# Patient Record
Sex: FEMALE | Race: BLACK OR AFRICAN AMERICAN | NOT HISPANIC OR LATINO | Employment: FULL TIME | URBAN - METROPOLITAN AREA
[De-identification: names, ages, dates, MRNs, and addresses within clinical notes are randomized per-mention and may not be internally consistent; named-entity substitution may affect disease eponyms.]

---

## 2022-09-01 ENCOUNTER — LAB VISIT (OUTPATIENT)
Dept: LAB | Facility: HOSPITAL | Age: 47
End: 2022-09-01
Attending: NURSE PRACTITIONER
Payer: MEDICAID

## 2022-09-01 DIAGNOSIS — N92.1 MENORRHAGIA WITH IRREGULAR CYCLE: ICD-10-CM

## 2022-09-01 DIAGNOSIS — N92.1 MENORRHAGIA WITH IRREGULAR CYCLE: Primary | ICD-10-CM

## 2022-09-01 LAB
BASOPHILS # BLD AUTO: 0.04 K/UL (ref 0–0.2)
BASOPHILS NFR BLD: 0.4 % (ref 0–1.9)
DIFFERENTIAL METHOD: ABNORMAL
EOSINOPHIL # BLD AUTO: 0.2 K/UL (ref 0–0.5)
EOSINOPHIL NFR BLD: 1.9 % (ref 0–8)
ERYTHROCYTE [DISTWIDTH] IN BLOOD BY AUTOMATED COUNT: 17.2 % (ref 11.5–14.5)
HCT VFR BLD AUTO: 27.2 % (ref 37–48.5)
HGB BLD-MCNC: 7.4 G/DL (ref 12–16)
IMM GRANULOCYTES # BLD AUTO: 0.02 K/UL (ref 0–0.04)
IMM GRANULOCYTES NFR BLD AUTO: 0.2 % (ref 0–0.5)
LYMPHOCYTES # BLD AUTO: 2 K/UL (ref 1–4.8)
LYMPHOCYTES NFR BLD: 22.1 % (ref 18–48)
MCH RBC QN AUTO: 20.9 PG (ref 27–31)
MCHC RBC AUTO-ENTMCNC: 27.2 G/DL (ref 32–36)
MCV RBC AUTO: 77 FL (ref 82–98)
MONOCYTES # BLD AUTO: 0.9 K/UL (ref 0.3–1)
MONOCYTES NFR BLD: 10.2 % (ref 4–15)
NEUTROPHILS # BLD AUTO: 5.9 K/UL (ref 1.8–7.7)
NEUTROPHILS NFR BLD: 65.2 % (ref 38–73)
NRBC BLD-RTO: 0 /100 WBC
PLATELET # BLD AUTO: 277 K/UL (ref 150–450)
PMV BLD AUTO: 12 FL (ref 9.2–12.9)
RBC # BLD AUTO: 3.54 M/UL (ref 4–5.4)
WBC # BLD AUTO: 9 K/UL (ref 3.9–12.7)

## 2022-09-01 PROCEDURE — 36415 COLL VENOUS BLD VENIPUNCTURE: CPT | Mod: PO | Performed by: NURSE PRACTITIONER

## 2022-09-01 PROCEDURE — 82728 ASSAY OF FERRITIN: CPT | Performed by: NURSE PRACTITIONER

## 2022-09-01 PROCEDURE — 85025 COMPLETE CBC W/AUTO DIFF WBC: CPT | Performed by: NURSE PRACTITIONER

## 2022-09-01 PROCEDURE — 84466 ASSAY OF TRANSFERRIN: CPT | Performed by: NURSE PRACTITIONER

## 2022-09-02 ENCOUNTER — PATIENT MESSAGE (OUTPATIENT)
Dept: HEMATOLOGY/ONCOLOGY | Facility: CLINIC | Age: 47
End: 2022-09-02
Payer: MEDICAID

## 2022-09-02 LAB
FERRITIN SERPL-MCNC: 5 NG/ML (ref 20–300)
IRON SERPL-MCNC: 17 UG/DL (ref 30–160)
SATURATED IRON: 3 % (ref 20–50)
TOTAL IRON BINDING CAPACITY: 601 UG/DL (ref 250–450)
TRANSFERRIN SERPL-MCNC: 406 MG/DL (ref 200–375)

## 2022-09-15 DIAGNOSIS — D50.0 IRON DEFICIENCY ANEMIA DUE TO CHRONIC BLOOD LOSS: ICD-10-CM

## 2022-09-15 RX ORDER — HEPARIN 100 UNIT/ML
500 SYRINGE INTRAVENOUS
Status: CANCELLED | OUTPATIENT
Start: 2022-09-15

## 2022-09-15 RX ORDER — HEPARIN 100 UNIT/ML
500 SYRINGE INTRAVENOUS
Status: CANCELLED | OUTPATIENT
Start: 2022-10-08

## 2022-09-15 RX ORDER — SODIUM CHLORIDE 0.9 % (FLUSH) 0.9 %
10 SYRINGE (ML) INJECTION
Status: CANCELLED | OUTPATIENT
Start: 2022-10-08

## 2022-09-15 RX ORDER — SODIUM CHLORIDE 0.9 % (FLUSH) 0.9 %
10 SYRINGE (ML) INJECTION
Status: CANCELLED | OUTPATIENT
Start: 2022-10-01

## 2022-09-15 RX ORDER — SODIUM CHLORIDE 0.9 % (FLUSH) 0.9 %
10 SYRINGE (ML) INJECTION
Status: CANCELLED | OUTPATIENT
Start: 2022-09-15

## 2022-09-15 RX ORDER — SODIUM CHLORIDE 0.9 % (FLUSH) 0.9 %
10 SYRINGE (ML) INJECTION
Status: CANCELLED | OUTPATIENT
Start: 2022-09-27

## 2022-09-15 RX ORDER — HEPARIN 100 UNIT/ML
500 SYRINGE INTRAVENOUS
Status: CANCELLED | OUTPATIENT
Start: 2022-10-01

## 2022-09-15 RX ORDER — HEPARIN 100 UNIT/ML
500 SYRINGE INTRAVENOUS
Status: CANCELLED | OUTPATIENT
Start: 2022-09-27

## 2022-09-15 RX ORDER — SODIUM CHLORIDE 0.9 % (FLUSH) 0.9 %
10 SYRINGE (ML) INJECTION
Status: CANCELLED | OUTPATIENT
Start: 2022-10-04

## 2022-09-15 RX ORDER — HEPARIN 100 UNIT/ML
500 SYRINGE INTRAVENOUS
Status: CANCELLED | OUTPATIENT
Start: 2022-10-04

## 2022-09-16 ENCOUNTER — PATIENT MESSAGE (OUTPATIENT)
Dept: HEMATOLOGY/ONCOLOGY | Facility: CLINIC | Age: 47
End: 2022-09-16
Payer: MEDICAID

## 2022-09-26 ENCOUNTER — INFUSION (OUTPATIENT)
Dept: INFUSION THERAPY | Facility: HOSPITAL | Age: 47
End: 2022-09-26
Attending: INTERNAL MEDICINE
Payer: MEDICAID

## 2022-09-26 VITALS
HEIGHT: 68 IN | DIASTOLIC BLOOD PRESSURE: 83 MMHG | OXYGEN SATURATION: 99 % | SYSTOLIC BLOOD PRESSURE: 136 MMHG | RESPIRATION RATE: 18 BRPM | BODY MASS INDEX: 32.74 KG/M2 | WEIGHT: 216.06 LBS | TEMPERATURE: 99 F | HEART RATE: 80 BPM

## 2022-09-26 DIAGNOSIS — D50.0 IRON DEFICIENCY ANEMIA DUE TO CHRONIC BLOOD LOSS: Primary | ICD-10-CM

## 2022-09-26 PROCEDURE — 63600175 PHARM REV CODE 636 W HCPCS: Performed by: NURSE PRACTITIONER

## 2022-09-26 PROCEDURE — 96374 THER/PROPH/DIAG INJ IV PUSH: CPT

## 2022-09-26 RX ORDER — SODIUM CHLORIDE 0.9 % (FLUSH) 0.9 %
10 SYRINGE (ML) INJECTION
Status: DISCONTINUED | OUTPATIENT
Start: 2022-09-26 | End: 2022-09-26 | Stop reason: HOSPADM

## 2022-09-26 RX ADMIN — IRON SUCROSE 200 MG: 20 INJECTION, SOLUTION INTRAVENOUS at 11:09

## 2022-09-26 NOTE — NURSING
Infusion# 1    Recent labs? Reviewed    Premeds? None    S/S of current or recent infections in the past 14 days? None/Denies    Recent Surgery/invasive procedures? None/Denies     Any recent vaccines ? None/Denies    Venofer 200 mg administered IV Pat a 8 minute rate per orders; see MAR and vitals for more  Details.

## 2022-09-29 ENCOUNTER — PATIENT MESSAGE (OUTPATIENT)
Dept: PEDIATRIC CARDIOLOGY | Facility: CLINIC | Age: 47
End: 2022-09-29
Payer: MEDICAID

## 2022-09-29 ENCOUNTER — INFUSION (OUTPATIENT)
Dept: INFUSION THERAPY | Facility: HOSPITAL | Age: 47
End: 2022-09-29
Attending: NURSE PRACTITIONER
Payer: COMMERCIAL

## 2022-09-29 VITALS
HEIGHT: 68 IN | RESPIRATION RATE: 18 BRPM | SYSTOLIC BLOOD PRESSURE: 140 MMHG | WEIGHT: 216.06 LBS | DIASTOLIC BLOOD PRESSURE: 80 MMHG | BODY MASS INDEX: 32.74 KG/M2 | TEMPERATURE: 98 F | HEART RATE: 80 BPM | OXYGEN SATURATION: 98 %

## 2022-09-29 DIAGNOSIS — D50.0 IRON DEFICIENCY ANEMIA DUE TO CHRONIC BLOOD LOSS: Primary | ICD-10-CM

## 2022-09-29 DIAGNOSIS — Q21.3 TOF (TETRALOGY OF FALLOT): Primary | ICD-10-CM

## 2022-09-29 PROCEDURE — 96374 THER/PROPH/DIAG INJ IV PUSH: CPT

## 2022-09-29 PROCEDURE — 63600175 PHARM REV CODE 636 W HCPCS: Performed by: NURSE PRACTITIONER

## 2022-09-29 RX ADMIN — IRON SUCROSE 200 MG: 20 INJECTION, SOLUTION INTRAVENOUS at 11:09

## 2022-09-29 NOTE — NURSING
Venofer  200 mg IVP over 5 minutes administered via left A/C PIV site.  Patient tolerated medication well without complication.   Discharged with appointments for future.

## 2022-10-03 ENCOUNTER — INFUSION (OUTPATIENT)
Dept: INFUSION THERAPY | Facility: HOSPITAL | Age: 47
End: 2022-10-03
Attending: NURSE PRACTITIONER
Payer: COMMERCIAL

## 2022-10-03 VITALS
HEART RATE: 80 BPM | DIASTOLIC BLOOD PRESSURE: 83 MMHG | BODY MASS INDEX: 33.69 KG/M2 | TEMPERATURE: 98 F | RESPIRATION RATE: 18 BRPM | WEIGHT: 221.56 LBS | SYSTOLIC BLOOD PRESSURE: 160 MMHG | OXYGEN SATURATION: 97 %

## 2022-10-03 DIAGNOSIS — D50.0 IRON DEFICIENCY ANEMIA DUE TO CHRONIC BLOOD LOSS: Primary | ICD-10-CM

## 2022-10-03 PROCEDURE — 25000003 PHARM REV CODE 250: Performed by: NURSE PRACTITIONER

## 2022-10-03 PROCEDURE — 63600175 PHARM REV CODE 636 W HCPCS: Performed by: NURSE PRACTITIONER

## 2022-10-03 PROCEDURE — 96374 THER/PROPH/DIAG INJ IV PUSH: CPT

## 2022-10-03 RX ORDER — SODIUM CHLORIDE 0.9 % (FLUSH) 0.9 %
10 SYRINGE (ML) INJECTION
Status: DISCONTINUED | OUTPATIENT
Start: 2022-10-03 | End: 2022-10-03 | Stop reason: HOSPADM

## 2022-10-03 RX ADMIN — IRON SUCROSE 200 MG: 20 INJECTION, SOLUTION INTRAVENOUS at 11:10

## 2022-10-03 RX ADMIN — SODIUM CHLORIDE: 0.9 INJECTION, SOLUTION INTRAVENOUS at 11:10

## 2022-10-03 NOTE — PLAN OF CARE
Problem: Adult Inpatient Plan of Care  Goal: Plan of Care Review  Outcome: Ongoing, Progressing  Flowsheets (Taken 10/3/2022 1129)  Plan of Care Reviewed With: patient  Goal: Patient-Specific Goal (Individualized)  Outcome: Ongoing, Progressing  Flowsheets (Taken 10/3/2022 1129)  Anxieties, Fears or Concerns: /o feeling bloated  Individualized Care Needs: war blanket and pillow proided  Patient-Specific Goals (Include Timeframe): tolerate enofer today  Goal: Absence of Hospital-Acquired Illness or Injury  Outcome: Ongoing, Progressing  Intervention: Identify and Manage Fall Risk  Flowsheets (Taken 10/3/2022 1129)  Safety Promotion/Fall Prevention:   assistive device/personal item within reach   in recliner, wheels locked   instructed to call staff for mobility  Goal: Optimal Comfort and Wellbeing  Outcome: Ongoing, Progressing     Problem: Anemia  Goal: Anemia Symptom Improvement  Outcome: Ongoing, Progressing

## 2022-10-06 ENCOUNTER — INFUSION (OUTPATIENT)
Dept: INFUSION THERAPY | Facility: HOSPITAL | Age: 47
End: 2022-10-06
Attending: NURSE PRACTITIONER
Payer: COMMERCIAL

## 2022-10-06 VITALS
TEMPERATURE: 98 F | BODY MASS INDEX: 32.81 KG/M2 | HEART RATE: 75 BPM | WEIGHT: 216.5 LBS | SYSTOLIC BLOOD PRESSURE: 157 MMHG | OXYGEN SATURATION: 97 % | HEIGHT: 68 IN | DIASTOLIC BLOOD PRESSURE: 80 MMHG | RESPIRATION RATE: 18 BRPM

## 2022-10-06 DIAGNOSIS — D50.0 IRON DEFICIENCY ANEMIA DUE TO CHRONIC BLOOD LOSS: Primary | ICD-10-CM

## 2022-10-06 PROCEDURE — 63600175 PHARM REV CODE 636 W HCPCS: Performed by: NURSE PRACTITIONER

## 2022-10-06 PROCEDURE — 96374 THER/PROPH/DIAG INJ IV PUSH: CPT

## 2022-10-06 RX ORDER — SODIUM CHLORIDE 0.9 % (FLUSH) 0.9 %
10 SYRINGE (ML) INJECTION
Status: DISCONTINUED | OUTPATIENT
Start: 2022-10-06 | End: 2022-10-06 | Stop reason: HOSPADM

## 2022-10-06 RX ADMIN — IRON SUCROSE 200 MG: 20 INJECTION, SOLUTION INTRAVENOUS at 11:10

## 2022-10-06 NOTE — NURSING
Venofer 200 mg IVP given over 8 minutes via left PIV.  Patient tolerated Venofer well.  Discharged patient after without complication.

## 2022-10-10 ENCOUNTER — INFUSION (OUTPATIENT)
Dept: INFUSION THERAPY | Facility: HOSPITAL | Age: 47
End: 2022-10-10
Attending: NURSE PRACTITIONER
Payer: COMMERCIAL

## 2022-10-10 VITALS
OXYGEN SATURATION: 97 % | SYSTOLIC BLOOD PRESSURE: 141 MMHG | TEMPERATURE: 97 F | RESPIRATION RATE: 18 BRPM | HEART RATE: 72 BPM | DIASTOLIC BLOOD PRESSURE: 69 MMHG

## 2022-10-10 DIAGNOSIS — D50.0 IRON DEFICIENCY ANEMIA DUE TO CHRONIC BLOOD LOSS: Primary | ICD-10-CM

## 2022-10-10 PROCEDURE — 96374 THER/PROPH/DIAG INJ IV PUSH: CPT

## 2022-10-10 PROCEDURE — 63600175 PHARM REV CODE 636 W HCPCS: Performed by: NURSE PRACTITIONER

## 2022-10-10 RX ADMIN — IRON SUCROSE 200 MG: 20 INJECTION, SOLUTION INTRAVENOUS at 11:10

## 2022-10-10 NOTE — PLAN OF CARE
Discussed plan of care with pt. Addressed any and ongoing concerns. Pt denies    Problem: Adult Inpatient Plan of Care  Goal: Plan of Care Review  Outcome: Ongoing, Progressing  Flowsheets (Taken 10/10/2022 1451)  Plan of Care Reviewed With: patient  Goal: Patient-Specific Goal (Individualized)  Outcome: Ongoing, Progressing  Goal: Absence of Hospital-Acquired Illness or Injury  Outcome: Ongoing, Progressing  Intervention: Identify and Manage Fall Risk  Flowsheets (Taken 10/10/2022 1451)  Safety Promotion/Fall Prevention: in recliner, wheels locked  Intervention: Prevent Infection  Flowsheets (Taken 10/10/2022 1451)  Infection Prevention:   hand hygiene promoted   equipment surfaces disinfected  Goal: Optimal Comfort and Wellbeing  Outcome: Ongoing, Progressing  Intervention: Provide Person-Centered Care  Flowsheets (Taken 10/10/2022 1451)  Trust Relationship/Rapport:   questions encouraged   questions answered   empathic listening provided   emotional support provided   choices provided   care explained   reassurance provided   thoughts/feelings acknowledged     Problem: Anemia  Goal: Anemia Symptom Improvement  Outcome: Ongoing, Progressing  Intervention: Monitor and Manage Anemia  Flowsheets (Taken 10/10/2022 1451)  Safety Promotion/Fall Prevention: in recliner, wheels locked

## 2022-10-17 ENCOUNTER — TELEPHONE (OUTPATIENT)
Dept: HEMATOLOGY/ONCOLOGY | Facility: CLINIC | Age: 47
End: 2022-10-17
Payer: MEDICAID

## 2022-10-17 NOTE — TELEPHONE ENCOUNTER
Spoke to pt in reference to Hematology appt scheduled via [facility].  Appt r/s per provider's orders.  Pt verbalized understanding.

## 2022-12-01 ENCOUNTER — LAB VISIT (OUTPATIENT)
Dept: LAB | Facility: HOSPITAL | Age: 47
End: 2022-12-01
Attending: NURSE PRACTITIONER
Payer: COMMERCIAL

## 2022-12-01 DIAGNOSIS — D50.0 IRON DEFICIENCY ANEMIA DUE TO CHRONIC BLOOD LOSS: ICD-10-CM

## 2022-12-01 LAB
ANION GAP SERPL CALC-SCNC: 13 MMOL/L (ref 8–16)
BASOPHILS # BLD AUTO: 0.06 K/UL (ref 0–0.2)
BASOPHILS NFR BLD: 0.7 % (ref 0–1.9)
BUN SERPL-MCNC: 14 MG/DL (ref 6–20)
CALCIUM SERPL-MCNC: 9.3 MG/DL (ref 8.7–10.5)
CHLORIDE SERPL-SCNC: 105 MMOL/L (ref 95–110)
CO2 SERPL-SCNC: 23 MMOL/L (ref 23–29)
CREAT SERPL-MCNC: 0.9 MG/DL (ref 0.5–1.4)
DIFFERENTIAL METHOD: ABNORMAL
EOSINOPHIL # BLD AUTO: 0.5 K/UL (ref 0–0.5)
EOSINOPHIL NFR BLD: 5.5 % (ref 0–8)
ERYTHROCYTE [DISTWIDTH] IN BLOOD BY AUTOMATED COUNT: 22 % (ref 11.5–14.5)
EST. GFR  (NO RACE VARIABLE): >60 ML/MIN/1.73 M^2
FERRITIN SERPL-MCNC: 14 NG/ML (ref 20–300)
GLUCOSE SERPL-MCNC: 90 MG/DL (ref 70–110)
HCT VFR BLD AUTO: 35.5 % (ref 37–48.5)
HGB BLD-MCNC: 10.8 G/DL (ref 12–16)
IMM GRANULOCYTES # BLD AUTO: 0.02 K/UL (ref 0–0.04)
IMM GRANULOCYTES NFR BLD AUTO: 0.2 % (ref 0–0.5)
LYMPHOCYTES # BLD AUTO: 1.9 K/UL (ref 1–4.8)
LYMPHOCYTES NFR BLD: 21.4 % (ref 18–48)
MCH RBC QN AUTO: 25.2 PG (ref 27–31)
MCHC RBC AUTO-ENTMCNC: 30.4 G/DL (ref 32–36)
MCV RBC AUTO: 83 FL (ref 82–98)
MONOCYTES # BLD AUTO: 0.9 K/UL (ref 0.3–1)
MONOCYTES NFR BLD: 10 % (ref 4–15)
NEUTROPHILS # BLD AUTO: 5.5 K/UL (ref 1.8–7.7)
NEUTROPHILS NFR BLD: 62.2 % (ref 38–73)
NRBC BLD-RTO: 0 /100 WBC
PLATELET # BLD AUTO: 209 K/UL (ref 150–450)
PMV BLD AUTO: 11.5 FL (ref 9.2–12.9)
POTASSIUM SERPL-SCNC: 3.8 MMOL/L (ref 3.5–5.1)
RBC # BLD AUTO: 4.28 M/UL (ref 4–5.4)
SODIUM SERPL-SCNC: 141 MMOL/L (ref 136–145)
WBC # BLD AUTO: 8.84 K/UL (ref 3.9–12.7)

## 2022-12-01 PROCEDURE — 80048 BASIC METABOLIC PNL TOTAL CA: CPT | Performed by: NURSE PRACTITIONER

## 2022-12-01 PROCEDURE — 82728 ASSAY OF FERRITIN: CPT | Performed by: NURSE PRACTITIONER

## 2022-12-01 PROCEDURE — 85025 COMPLETE CBC W/AUTO DIFF WBC: CPT | Performed by: NURSE PRACTITIONER

## 2022-12-01 PROCEDURE — 84466 ASSAY OF TRANSFERRIN: CPT | Performed by: NURSE PRACTITIONER

## 2022-12-01 PROCEDURE — 36415 COLL VENOUS BLD VENIPUNCTURE: CPT | Mod: PO | Performed by: NURSE PRACTITIONER

## 2022-12-02 LAB
IRON SERPL-MCNC: 70 UG/DL (ref 30–160)
SATURATED IRON: 13 % (ref 20–50)
TOTAL IRON BINDING CAPACITY: 530 UG/DL (ref 250–450)
TRANSFERRIN SERPL-MCNC: 358 MG/DL (ref 200–375)

## 2022-12-15 ENCOUNTER — PATIENT MESSAGE (OUTPATIENT)
Dept: HEMATOLOGY/ONCOLOGY | Facility: CLINIC | Age: 47
End: 2022-12-15
Payer: COMMERCIAL

## 2022-12-15 ENCOUNTER — TELEPHONE (OUTPATIENT)
Dept: HEMATOLOGY/ONCOLOGY | Facility: CLINIC | Age: 47
End: 2022-12-15
Payer: COMMERCIAL

## 2022-12-15 NOTE — TELEPHONE ENCOUNTER
----- Message from Brianna Jones NP sent at 12/15/2022  1:01 PM CST -----  Did labs for patient but has no f/u to discuss results.  Brianna Gonzales'

## 2022-12-16 DIAGNOSIS — D50.0 IRON DEFICIENCY ANEMIA DUE TO CHRONIC BLOOD LOSS: Primary | ICD-10-CM

## 2023-01-05 ENCOUNTER — OFFICE VISIT (OUTPATIENT)
Dept: HEMATOLOGY/ONCOLOGY | Facility: CLINIC | Age: 48
End: 2023-01-05
Payer: COMMERCIAL

## 2023-01-05 VITALS
HEIGHT: 67 IN | OXYGEN SATURATION: 98 % | WEIGHT: 219.38 LBS | HEART RATE: 86 BPM | BODY MASS INDEX: 34.43 KG/M2 | DIASTOLIC BLOOD PRESSURE: 75 MMHG | SYSTOLIC BLOOD PRESSURE: 121 MMHG

## 2023-01-05 DIAGNOSIS — R14.0 ABDOMINAL DISTENTION: ICD-10-CM

## 2023-01-05 DIAGNOSIS — R68.81 EARLY SATIETY: ICD-10-CM

## 2023-01-05 DIAGNOSIS — N92.1 MENORRHAGIA WITH IRREGULAR CYCLE: ICD-10-CM

## 2023-01-05 DIAGNOSIS — Q21.3 TETRALOGY OF FALLOT: ICD-10-CM

## 2023-01-05 DIAGNOSIS — D50.0 IRON DEFICIENCY ANEMIA DUE TO CHRONIC BLOOD LOSS: Primary | ICD-10-CM

## 2023-01-05 PROCEDURE — 99999 PR PBB SHADOW E&M-EST. PATIENT-LVL III: ICD-10-PCS | Mod: PBBFAC,,, | Performed by: NURSE PRACTITIONER

## 2023-01-05 PROCEDURE — 3074F PR MOST RECENT SYSTOLIC BLOOD PRESSURE < 130 MM HG: ICD-10-PCS | Mod: CPTII,S$GLB,, | Performed by: NURSE PRACTITIONER

## 2023-01-05 PROCEDURE — 3078F PR MOST RECENT DIASTOLIC BLOOD PRESSURE < 80 MM HG: ICD-10-PCS | Mod: CPTII,S$GLB,, | Performed by: NURSE PRACTITIONER

## 2023-01-05 PROCEDURE — 3074F SYST BP LT 130 MM HG: CPT | Mod: CPTII,S$GLB,, | Performed by: NURSE PRACTITIONER

## 2023-01-05 PROCEDURE — 1159F PR MEDICATION LIST DOCUMENTED IN MEDICAL RECORD: ICD-10-PCS | Mod: CPTII,S$GLB,, | Performed by: NURSE PRACTITIONER

## 2023-01-05 PROCEDURE — 99204 OFFICE O/P NEW MOD 45 MIN: CPT | Mod: S$GLB,,, | Performed by: NURSE PRACTITIONER

## 2023-01-05 PROCEDURE — 3008F PR BODY MASS INDEX (BMI) DOCUMENTED: ICD-10-PCS | Mod: CPTII,S$GLB,, | Performed by: NURSE PRACTITIONER

## 2023-01-05 PROCEDURE — 3008F BODY MASS INDEX DOCD: CPT | Mod: CPTII,S$GLB,, | Performed by: NURSE PRACTITIONER

## 2023-01-05 PROCEDURE — 99204 PR OFFICE/OUTPT VISIT, NEW, LEVL IV, 45-59 MIN: ICD-10-PCS | Mod: S$GLB,,, | Performed by: NURSE PRACTITIONER

## 2023-01-05 PROCEDURE — 3078F DIAST BP <80 MM HG: CPT | Mod: CPTII,S$GLB,, | Performed by: NURSE PRACTITIONER

## 2023-01-05 PROCEDURE — 1159F MED LIST DOCD IN RCRD: CPT | Mod: CPTII,S$GLB,, | Performed by: NURSE PRACTITIONER

## 2023-01-05 PROCEDURE — 99999 PR PBB SHADOW E&M-EST. PATIENT-LVL III: CPT | Mod: PBBFAC,,, | Performed by: NURSE PRACTITIONER

## 2023-01-05 PROCEDURE — 1160F PR REVIEW ALL MEDS BY PRESCRIBER/CLIN PHARMACIST DOCUMENTED: ICD-10-PCS | Mod: CPTII,S$GLB,, | Performed by: NURSE PRACTITIONER

## 2023-01-05 PROCEDURE — 1160F RVW MEDS BY RX/DR IN RCRD: CPT | Mod: CPTII,S$GLB,, | Performed by: NURSE PRACTITIONER

## 2023-01-05 RX ORDER — HEPARIN 100 UNIT/ML
500 SYRINGE INTRAVENOUS
Status: CANCELLED | OUTPATIENT
Start: 2023-01-18

## 2023-01-05 RX ORDER — SODIUM CHLORIDE 0.9 % (FLUSH) 0.9 %
10 SYRINGE (ML) INJECTION
Status: CANCELLED | OUTPATIENT
Start: 2023-01-22

## 2023-01-05 RX ORDER — DEXBROMPHENIRAMINE MALEATE, DEXTROMETHORPHAN HYDROBROMIDE AND PHENYLEPHRINE HYDROCHLORIDE 2; 15; 7.5 MG/5ML; MG/5ML; MG/5ML
5 LIQUID ORAL DAILY PRN
COMMUNITY
Start: 2022-11-29 | End: 2023-02-03

## 2023-01-05 RX ORDER — HEPARIN 100 UNIT/ML
500 SYRINGE INTRAVENOUS
Status: CANCELLED | OUTPATIENT
Start: 2023-01-25

## 2023-01-05 RX ORDER — SODIUM CHLORIDE 0.9 % (FLUSH) 0.9 %
10 SYRINGE (ML) INJECTION
Status: CANCELLED | OUTPATIENT
Start: 2023-01-18

## 2023-01-05 RX ORDER — HEPARIN 100 UNIT/ML
500 SYRINGE INTRAVENOUS
Status: CANCELLED | OUTPATIENT
Start: 2023-01-29

## 2023-01-05 RX ORDER — SODIUM CHLORIDE 0.9 % (FLUSH) 0.9 %
10 SYRINGE (ML) INJECTION
Status: CANCELLED | OUTPATIENT
Start: 2023-01-05

## 2023-01-05 RX ORDER — ASPIRIN 81 MG/1
1 TABLET ORAL DAILY
COMMUNITY
Start: 2022-08-15 | End: 2023-12-14

## 2023-01-05 RX ORDER — SODIUM CHLORIDE 0.9 % (FLUSH) 0.9 %
10 SYRINGE (ML) INJECTION
Status: CANCELLED | OUTPATIENT
Start: 2023-01-25

## 2023-01-05 RX ORDER — HEPARIN 100 UNIT/ML
500 SYRINGE INTRAVENOUS
Status: CANCELLED | OUTPATIENT
Start: 2023-01-22

## 2023-01-05 RX ORDER — SODIUM CHLORIDE 0.9 % (FLUSH) 0.9 %
10 SYRINGE (ML) INJECTION
Status: CANCELLED | OUTPATIENT
Start: 2023-01-29

## 2023-01-05 RX ORDER — HEPARIN 100 UNIT/ML
500 SYRINGE INTRAVENOUS
Status: CANCELLED | OUTPATIENT
Start: 2023-01-05

## 2023-01-05 NOTE — PROGRESS NOTES
Subjective:      Patient ID: Mer Priest is a 47 y.o. female.    Chief Complaint: fatigue    HPI:  Patient is a 47 year old female who presents today as a new patient for further evaluation and recommendations of RADHA due to heavy menstrual cycles.  Labs and IV venofer 200 mg IV push injections x 5 done with last dose on 10/10/2022.  She presents today to evaluate response.  Continues to be anemic; however improved with low ferritin however improved 14 (5).    Cardiac MRI Repaired tetralogy of Fallot. Enlarged with dilated right ventricle and right ventricular outflow tract. Mildly reduced right and left left ventricular function.  There is no mass or delayed enhancement of the left ventricular myocardium. Small diverticulum of the anterior septum. There is late gadolinium enhancement of the right ventricular outflow tract presumably related to patch repair.  No pericardial effusion.     Severely dilated right ventricular outflow tract. Normal pulmonary artery without branch stenosis. All pulmonary veins drain normally into the left atrium. 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 Normal head and neck vessels.      2021 Mammogram no evidence of malignancy          PAP - HPV not detected.      2021 Endometrial biopsy - chronic non specific endometritis    Uterus is 2x's the size it should be and has fibroids.  Needs to have surgery for congenital malformation of heart prior to having hysterectomy.      Social History     Socioeconomic History    Marital status: Single   Tobacco Use    Smoking status: Former     Packs/day: 0.50     Types: Cigarettes     Quit date: 2022     Years since quittin.6    Smokeless tobacco: Never       Family History   Problem Relation Age of Onset    Prostate cancer Father     Ovarian cancer Cousin        Past Surgical History:   Procedure Laterality Date    BILATERAL TUBAL LIGATION      TETRALOGY OF FALLOT REPAIR         Past Medical History:   Diagnosis  Date    Heart disease     Hypertension     Syphilis 11/2020       Review of Systems   Constitutional:  Positive for appetite change (early satiety), fatigue (starting to feel more fatigued) and unexpected weight change (weight gain).   Eyes: Negative.    Respiratory:  Positive for shortness of breath (with exertion - improved).    Cardiovascular:  Positive for palpitations (sometimes - at night).   Gastrointestinal:  Positive for abdominal distention and constipation (sometimes). Negative for anal bleeding and blood in stool.   Endocrine: Positive for heat intolerance. Negative for cold intolerance.   Genitourinary:  Positive for menstrual problem (LMP very heavy about 2-3 weeks.).   Musculoskeletal: Negative.    Skin: Negative.    Allergic/Immunologic: Negative.    Neurological:  Positive for light-headedness.   Psychiatric/Behavioral:  Positive for sleep disturbance. The patient is nervous/anxious.         Medication List with Changes/Refills   Current Medications    ALAHIST DM 2-7.5-15 MG/5 ML LIQD    Take 5 mLs by mouth daily as needed.    AMLODIPINE (NORVASC) 5 MG TABLET    Take 1 tablet by mouth every day    ASPIRIN (ECOTRIN) 81 MG EC TABLET    Take 1 tablet by mouth once daily.    GABAPENTIN (NEURONTIN) 300 MG CAPSULE    Take 1 capsule by mouth 3 (three) times daily.    HYDROCHLOROTHIAZIDE (MICROZIDE) 12.5 MG CAPSULE    Take 1 capsule (12.5 mg total) by mouth once daily.    METOPROLOL SUCCINATE (TOPROL-XL) 50 MG 24 HR TABLET    Take 1 tablet by mouth every day   Discontinued Medications    AMLODIPINE (NORVASC) 10 MG TABLET    Take 1 tablet by mouth every day    HYDROCHLOROTHIAZIDE (MICROZIDE) 12.5 MG CAPSULE    Take 12.5 mg by mouth once daily.        Objective:     Vitals:    01/05/23 1309   BP: 121/75   Pulse: 86       Physical Exam  Vitals reviewed.   Constitutional:       Appearance: Normal appearance.   HENT:      Head: Normocephalic and atraumatic.      Right Ear: External ear normal.      Left Ear:  External ear normal.   Cardiovascular:      Rate and Rhythm: Normal rate and regular rhythm.      Heart sounds: S1 normal and S2 normal. Murmur heard.   Pulmonary:      Effort: Pulmonary effort is normal.      Breath sounds: Normal breath sounds. No rales.   Abdominal:      General: There is no distension.   Musculoskeletal:         General: Normal range of motion.      Cervical back: Normal range of motion.   Skin:     General: Skin is warm and dry.   Neurological:      General: No focal deficit present.      Mental Status: She is alert and oriented to person, place, and time.   Psychiatric:         Attention and Perception: Attention and perception normal.         Mood and Affect: Mood and affect normal.         Speech: Speech normal.         Behavior: Behavior normal. Behavior is cooperative.         Thought Content: Thought content normal.         Cognition and Memory: Cognition and memory normal.         Judgment: Judgment normal.       Assessment:     Problem List Items Addressed This Visit          Oncology    Iron deficiency anemia due to chronic blood loss - Primary     Other Visit Diagnoses       Menorrhagia with irregular cycle        Tetralogy of Fallot        Relevant Orders    Echo    EKG 12-lead    Ambulatory referral/consult to Cardiothoracic Surgery    Early satiety        Relevant Orders    US Abdomen Complete    Abdominal distention        Relevant Orders    US Abdomen Complete            Lab Results   Component Value Date    WBC 8.84 12/01/2022    RBC 4.28 12/01/2022    HGB 10.8 (L) 12/01/2022    HCT 35.5 (L) 12/01/2022    MCV 83 12/01/2022    MCH 25.2 (L) 12/01/2022    MCHC 30.4 (L) 12/01/2022    RDW 22.0 (H) 12/01/2022     12/01/2022    MPV 11.5 12/01/2022    GRAN 5.5 12/01/2022    GRAN 62.2 12/01/2022    LYMPH 1.9 12/01/2022    LYMPH 21.4 12/01/2022    MONO 0.9 12/01/2022    MONO 10.0 12/01/2022    EOS 0.5 12/01/2022    BASO 0.06 12/01/2022    EOSINOPHIL 5.5 12/01/2022    BASOPHIL 0.7  12/01/2022      Lab Results   Component Value Date     12/01/2022    K 3.8 12/01/2022     12/01/2022    CO2 23 12/01/2022    BUN 14 12/01/2022    CREATININE 0.9 12/01/2022    CALCIUM 9.3 12/01/2022    ANIONGAP 13 12/01/2022     No results found for: ALT, AST, GGT, ALKPHOS, BILITOT    Lab Results   Component Value Date    IRON 70 12/01/2022    TRANSFERRIN 358 12/01/2022    TIBC 530 (H) 12/01/2022    FESATURATED 13 (L) 12/01/2022      Lab Results   Component Value Date    FERRITIN 14 (L) 12/01/2022         Plan:   Iron deficiency anemia due to chronic blood loss    Menorrhagia with irregular cycle    Tetralogy of Fallot  -     Echo; Future  -     EKG 12-lead; Future  -     Ambulatory referral/consult to Cardiothoracic Surgery; Future; Expected date: 01/12/2023    Early satiety  -     US Abdomen Complete; Future; Expected date: 01/05/2023    Abdominal distention  -     US Abdomen Complete; Future; Expected date: 01/05/2023    Other orders  -     iron sucrose injection 200 mg  -     sodium chloride 0.9% 250 mL flush bag  -     sodium chloride 0.9% flush 10 mL  -     heparin, porcine (PF) 100 unit/mL injection flush 500 Units  -     alteplase injection 2 mg  -     iron sucrose injection 200 mg  -     sodium chloride 0.9% 250 mL flush bag  -     sodium chloride 0.9% flush 10 mL  -     heparin, porcine (PF) 100 unit/mL injection flush 500 Units  -     alteplase injection 2 mg  -     iron sucrose injection 200 mg  -     sodium chloride 0.9% 250 mL flush bag  -     sodium chloride 0.9% flush 10 mL  -     heparin, porcine (PF) 100 unit/mL injection flush 500 Units  -     alteplase injection 2 mg  -     iron sucrose injection 200 mg  -     sodium chloride 0.9% 250 mL flush bag  -     sodium chloride 0.9% flush 10 mL  -     heparin, porcine (PF) 100 unit/mL injection flush 500 Units  -     alteplase injection 2 mg  -     iron sucrose injection 200 mg  -     sodium chloride 0.9% 250 mL flush bag  -     sodium  chloride 0.9% flush 10 mL  -     heparin, porcine (PF) 100 unit/mL injection flush 500 Units  -     alteplase injection 2 mg      Med Onc Chart Routing      Follow up with physician    Follow up with [facility] . F/u 6 weeks after last iron treatment with labs prior cbc, bmp, iron studies at [place]   Infusion scheduling note schedule for venofer 200 mg IV push D1 and D4 x 5 doses at [facility]   Injection scheduling note n/a   Labs   Lab interval:  See above   Imaging   Abdominal US complete to assess abdominal distention/early satiety; schedule for ECHO and 12 lead EKG prior to cardiothoracic surgeon at [facility]- preferably Dr. Alva   Pharmacy appointment No pharmacy appointment needed      Other referrals Additional referrals needed            Collaborating Provider:  Dr. Martin Bucriaga    Thank You,  Earl Jones, FNP-C  Hematology Oncology

## 2023-01-10 ENCOUNTER — HOSPITAL ENCOUNTER (OUTPATIENT)
Dept: RADIOLOGY | Facility: HOSPITAL | Age: 48
Discharge: HOME | End: 2023-01-10
Attending: NURSE PRACTITIONER
Payer: COMMERCIAL

## 2023-01-10 DIAGNOSIS — R14.0 ABDOMINAL DISTENTION: ICD-10-CM

## 2023-01-10 DIAGNOSIS — R68.81 EARLY SATIETY: ICD-10-CM

## 2023-01-10 PROCEDURE — 76700 US ABDOMEN COMPLETE: ICD-10-PCS | Mod: 26,,, | Performed by: RADIOLOGY

## 2023-01-10 PROCEDURE — 76700 US EXAM ABDOM COMPLETE: CPT | Mod: TC

## 2023-01-10 PROCEDURE — 76700 US EXAM ABDOM COMPLETE: CPT | Mod: 26,,, | Performed by: RADIOLOGY

## 2023-01-12 ENCOUNTER — PATIENT MESSAGE (OUTPATIENT)
Dept: HEMATOLOGY/ONCOLOGY | Facility: CLINIC | Age: 48
End: 2023-01-12
Payer: COMMERCIAL

## 2023-01-12 DIAGNOSIS — D50.0 IRON DEFICIENCY ANEMIA DUE TO CHRONIC BLOOD LOSS: Primary | ICD-10-CM

## 2023-01-13 ENCOUNTER — HOSPITAL ENCOUNTER (OUTPATIENT)
Dept: CARDIOLOGY | Facility: HOSPITAL | Age: 48
Discharge: HOME | End: 2023-01-13
Payer: COMMERCIAL

## 2023-01-13 DIAGNOSIS — D50.0 IRON DEFICIENCY ANEMIA DUE TO CHRONIC BLOOD LOSS: ICD-10-CM

## 2023-01-13 PROCEDURE — 93010 ELECTROCARDIOGRAM REPORT: CPT | Mod: ,,, | Performed by: INTERNAL MEDICINE

## 2023-01-13 PROCEDURE — 93010 EKG 12-LEAD: ICD-10-PCS | Mod: ,,, | Performed by: INTERNAL MEDICINE

## 2023-01-13 PROCEDURE — 93005 ELECTROCARDIOGRAM TRACING: CPT

## 2023-01-17 ENCOUNTER — INFUSION (OUTPATIENT)
Dept: INFUSION THERAPY | Facility: HOSPITAL | Age: 48
End: 2023-01-17
Attending: NURSE PRACTITIONER
Payer: COMMERCIAL

## 2023-01-17 VITALS
HEART RATE: 78 BPM | RESPIRATION RATE: 20 BRPM | OXYGEN SATURATION: 98 % | DIASTOLIC BLOOD PRESSURE: 82 MMHG | SYSTOLIC BLOOD PRESSURE: 146 MMHG | TEMPERATURE: 98 F

## 2023-01-17 DIAGNOSIS — D50.0 IRON DEFICIENCY ANEMIA DUE TO CHRONIC BLOOD LOSS: Primary | ICD-10-CM

## 2023-01-17 PROCEDURE — 25000003 PHARM REV CODE 250: Performed by: NURSE PRACTITIONER

## 2023-01-17 PROCEDURE — 96374 THER/PROPH/DIAG INJ IV PUSH: CPT

## 2023-01-17 PROCEDURE — 63600175 PHARM REV CODE 636 W HCPCS: Performed by: NURSE PRACTITIONER

## 2023-01-17 RX ORDER — SODIUM CHLORIDE 0.9 % (FLUSH) 0.9 %
10 SYRINGE (ML) INJECTION
Status: DISCONTINUED | OUTPATIENT
Start: 2023-01-17 | End: 2023-01-17 | Stop reason: HOSPADM

## 2023-01-17 RX ADMIN — SODIUM CHLORIDE: 9 INJECTION, SOLUTION INTRAVENOUS at 03:01

## 2023-01-17 RX ADMIN — IRON SUCROSE 200 MG: 20 INJECTION, SOLUTION INTRAVENOUS at 03:01

## 2023-01-17 NOTE — PLAN OF CARE
Problem: Adult Inpatient Plan of Care  Goal: Plan of Care Review  Outcome: Ongoing, Progressing  Flowsheets (Taken 1/17/2023 1603)  Plan of Care Reviewed With: patient  Goal: Patient-Specific Goal (Individualized)  Outcome: Ongoing, Progressing  Flowsheets (Taken 1/17/2023 1603)  Anxieties, Fears or Concerns: denies  Individualized Care Needs: feet elevated  Goal: Optimal Comfort and Wellbeing  Outcome: Ongoing, Progressing  Intervention: Provide Person-Centered Care  Flowsheets (Taken 1/17/2023 1603)  Trust Relationship/Rapport:   care explained   questions encouraged   choices provided   reassurance provided   emotional support provided   thoughts/feelings acknowledged   empathic listening provided   questions answered     Problem: Anemia  Goal: Anemia Symptom Improvement  Outcome: Ongoing, Progressing  Intervention: Monitor and Manage Anemia  Flowsheets (Taken 1/17/2023 1603)  Safety Promotion/Fall Prevention:   assistive device/personal item within reach   Fall Risk reviewed with patient/family   in recliner, wheels locked   instructed to call staff for mobility  Fatigue Management: frequent rest breaks encouraged

## 2023-01-20 ENCOUNTER — INFUSION (OUTPATIENT)
Dept: INFUSION THERAPY | Facility: HOSPITAL | Age: 48
End: 2023-01-20
Attending: NURSE PRACTITIONER
Payer: COMMERCIAL

## 2023-01-20 VITALS
SYSTOLIC BLOOD PRESSURE: 136 MMHG | OXYGEN SATURATION: 98 % | DIASTOLIC BLOOD PRESSURE: 66 MMHG | RESPIRATION RATE: 16 BRPM | HEART RATE: 70 BPM | TEMPERATURE: 98 F

## 2023-01-20 DIAGNOSIS — D50.0 IRON DEFICIENCY ANEMIA DUE TO CHRONIC BLOOD LOSS: Primary | ICD-10-CM

## 2023-01-20 PROCEDURE — 96374 THER/PROPH/DIAG INJ IV PUSH: CPT

## 2023-01-20 PROCEDURE — 63600175 PHARM REV CODE 636 W HCPCS: Performed by: NURSE PRACTITIONER

## 2023-01-20 RX ADMIN — IRON SUCROSE 200 MG: 20 INJECTION, SOLUTION INTRAVENOUS at 07:01

## 2023-01-20 NOTE — PLAN OF CARE
Discussed plan of care with pt. Addressed any and ongoing concerns. Pt denies    Problem: Adult Inpatient Plan of Care  Goal: Plan of Care Review  Outcome: Ongoing, Progressing  Flowsheets (Taken 1/20/2023 0755)  Plan of Care Reviewed With: patient  Goal: Patient-Specific Goal (Individualized)  Outcome: Ongoing, Progressing  Goal: Absence of Hospital-Acquired Illness or Injury  Outcome: Ongoing, Progressing  Intervention: Identify and Manage Fall Risk  Flowsheets (Taken 1/20/2023 0755)  Safety Promotion/Fall Prevention: in recliner, wheels locked  Intervention: Prevent Infection  Flowsheets (Taken 1/20/2023 0755)  Infection Prevention:   equipment surfaces disinfected   hand hygiene promoted  Goal: Optimal Comfort and Wellbeing  Outcome: Ongoing, Progressing  Intervention: Provide Person-Centered Care  Flowsheets (Taken 1/20/2023 0755)  Trust Relationship/Rapport:   questions answered   empathic listening provided   emotional support provided   choices provided   care explained   questions encouraged   reassurance provided   thoughts/feelings acknowledged     Problem: Anemia  Goal: Anemia Symptom Improvement  Outcome: Ongoing, Progressing  Intervention: Monitor and Manage Anemia  Flowsheets (Taken 1/20/2023 0755)  Safety Promotion/Fall Prevention: in recliner, wheels locked  Fatigue Management: fatigue-related activity identified

## 2023-01-23 ENCOUNTER — HOSPITAL ENCOUNTER (OUTPATIENT)
Dept: CARDIOLOGY | Facility: HOSPITAL | Age: 48
Discharge: HOME | End: 2023-01-23
Attending: NURSE PRACTITIONER
Payer: COMMERCIAL

## 2023-01-23 VITALS — WEIGHT: 219 LBS | HEIGHT: 67 IN | BODY MASS INDEX: 34.37 KG/M2

## 2023-01-23 DIAGNOSIS — Q21.3 TETRALOGY OF FALLOT: ICD-10-CM

## 2023-01-23 LAB
AV INDEX (PROSTH): 0.75
AV MEAN GRADIENT: 3 MMHG
AV PEAK GRADIENT: 6 MMHG
AV VALVE AREA: 2.3 CM2
AV VELOCITY RATIO: 0.75
BSA FOR ECHO PROCEDURE: 2.17 M2
CV ECHO LV RWT: 0.48 CM
DOP CALC AO PEAK VEL: 1.18 M/S
DOP CALC AO VTI: 25.7 CM
DOP CALC LVOT AREA: 3.1 CM2
DOP CALC LVOT DIAMETER: 1.98 CM
DOP CALC LVOT PEAK VEL: 0.88 M/S
DOP CALC LVOT STROKE VOLUME: 59.09 CM3
DOP CALC RVOT PEAK VEL: 0.73 M/S
DOP CALC RVOT VTI: 20.1 CM
DOP CALCLVOT PEAK VEL VTI: 19.2 CM
E WAVE DECELERATION TIME: 196.19 MSEC
E/A RATIO: 2.37
E/E' RATIO: 12.91 M/S
ECHO LV POSTERIOR WALL: 1.14 CM (ref 0.6–1.1)
EJECTION FRACTION: 55 %
FRACTIONAL SHORTENING: 35 % (ref 28–44)
INTERVENTRICULAR SEPTUM: 1.13 CM (ref 0.6–1.1)
IVRT: 91.34 MSEC
LA MAJOR: 6.47 CM
LA MINOR: 6.07 CM
LA WIDTH: 4 CM
LEFT ATRIUM SIZE: 4.13 CM
LEFT ATRIUM VOLUME INDEX MOD: 23.5 ML/M2
LEFT ATRIUM VOLUME INDEX: 41.9 ML/M2
LEFT ATRIUM VOLUME MOD: 49.27 CM3
LEFT ATRIUM VOLUME: 87.95 CM3
LEFT INTERNAL DIMENSION IN SYSTOLE: 3.13 CM (ref 2.1–4)
LEFT VENTRICLE DIASTOLIC VOLUME INDEX: 50.6 ML/M2
LEFT VENTRICLE DIASTOLIC VOLUME: 106.27 ML
LEFT VENTRICLE MASS INDEX: 96 G/M2
LEFT VENTRICLE SYSTOLIC VOLUME INDEX: 18.4 ML/M2
LEFT VENTRICLE SYSTOLIC VOLUME: 38.72 ML
LEFT VENTRICULAR INTERNAL DIMENSION IN DIASTOLE: 4.78 CM (ref 3.5–6)
LEFT VENTRICULAR MASS: 201.27 G
LV LATERAL E/E' RATIO: 10.92 M/S
LV SEPTAL E/E' RATIO: 15.78 M/S
LVOT MG: 1.67 MMHG
LVOT MV: 0.61 CM/S
MV PEAK A VEL: 0.6 M/S
MV PEAK E VEL: 1.42 M/S
MV STENOSIS PRESSURE HALF TIME: 56.89 MS
MV VALVE AREA P 1/2 METHOD: 3.87 CM2
PISA TR MAX VEL: 2.97 M/S
PV MEAN GRADIENT: 1.16 MMHG
PV PEAK VELOCITY: 1.86 CM/S
RA MAJOR: 4.87 CM
RA PRESSURE: 8 MMHG
RA WIDTH: 3.99 CM
RIGHT VENTRICULAR END-DIASTOLIC DIMENSION: 3.93 CM
SINUS: 3.47 CM
STJ: 3.14 CM
TDI LATERAL: 0.13 M/S
TDI SEPTAL: 0.09 M/S
TDI: 0.11 M/S
TR MAX PG: 35 MMHG
TRICUSPID ANNULAR PLANE SYSTOLIC EXCURSION: 1.99 CM
TV REST PULMONARY ARTERY PRESSURE: 43 MMHG

## 2023-01-23 PROCEDURE — 93306 TTE W/DOPPLER COMPLETE: CPT

## 2023-01-23 PROCEDURE — 93306 TTE W/DOPPLER COMPLETE: CPT | Mod: 26,,, | Performed by: INTERNAL MEDICINE

## 2023-01-23 PROCEDURE — 93306 ECHO (CUPID ONLY): ICD-10-PCS | Mod: 26,,, | Performed by: INTERNAL MEDICINE

## 2023-01-24 ENCOUNTER — INFUSION (OUTPATIENT)
Dept: INFUSION THERAPY | Facility: HOSPITAL | Age: 48
End: 2023-01-24
Attending: NURSE PRACTITIONER
Payer: COMMERCIAL

## 2023-01-24 VITALS
OXYGEN SATURATION: 99 % | RESPIRATION RATE: 18 BRPM | TEMPERATURE: 98 F | SYSTOLIC BLOOD PRESSURE: 154 MMHG | DIASTOLIC BLOOD PRESSURE: 79 MMHG | HEART RATE: 72 BPM

## 2023-01-24 DIAGNOSIS — D50.0 IRON DEFICIENCY ANEMIA DUE TO CHRONIC BLOOD LOSS: Primary | ICD-10-CM

## 2023-01-24 PROCEDURE — 96374 THER/PROPH/DIAG INJ IV PUSH: CPT

## 2023-01-24 PROCEDURE — 63600175 PHARM REV CODE 636 W HCPCS: Performed by: NURSE PRACTITIONER

## 2023-01-24 RX ADMIN — IRON SUCROSE 200 MG: 20 INJECTION, SOLUTION INTRAVENOUS at 11:01

## 2023-01-24 NOTE — PLAN OF CARE
Discussed plan of care with pt. Addressed any and ongoing concerns. Pt denies    Problem: Adult Inpatient Plan of Care  Goal: Plan of Care Review  Outcome: Ongoing, Progressing  Flowsheets (Taken 1/24/2023 1122)  Plan of Care Reviewed With: patient  Goal: Patient-Specific Goal (Individualized)  Outcome: Ongoing, Progressing  Goal: Absence of Hospital-Acquired Illness or Injury  Outcome: Ongoing, Progressing  Intervention: Identify and Manage Fall Risk  Flowsheets (Taken 1/24/2023 1122)  Safety Promotion/Fall Prevention:   room near unit station   in recliner, wheels locked  Intervention: Prevent Infection  Flowsheets (Taken 1/24/2023 1122)  Infection Prevention:   hand hygiene promoted   equipment surfaces disinfected  Goal: Optimal Comfort and Wellbeing  Outcome: Ongoing, Progressing  Intervention: Monitor Pain and Promote Comfort  Flowsheets (Taken 1/24/2023 1122)  Pain Management Interventions: quiet environment facilitated  Intervention: Provide Person-Centered Care  Flowsheets (Taken 1/24/2023 1122)  Trust Relationship/Rapport:   care explained   questions encouraged   reassurance provided   choices provided   thoughts/feelings acknowledged   emotional support provided   empathic listening provided   questions answered

## 2023-01-27 ENCOUNTER — INFUSION (OUTPATIENT)
Dept: INFUSION THERAPY | Facility: HOSPITAL | Age: 48
End: 2023-01-27
Attending: NURSE PRACTITIONER
Payer: COMMERCIAL

## 2023-01-27 VITALS
OXYGEN SATURATION: 99 % | SYSTOLIC BLOOD PRESSURE: 144 MMHG | HEART RATE: 75 BPM | TEMPERATURE: 98 F | DIASTOLIC BLOOD PRESSURE: 76 MMHG | RESPIRATION RATE: 18 BRPM

## 2023-01-27 DIAGNOSIS — D50.0 IRON DEFICIENCY ANEMIA DUE TO CHRONIC BLOOD LOSS: Primary | ICD-10-CM

## 2023-01-27 PROCEDURE — 63600175 PHARM REV CODE 636 W HCPCS: Performed by: NURSE PRACTITIONER

## 2023-01-27 PROCEDURE — 96374 THER/PROPH/DIAG INJ IV PUSH: CPT

## 2023-01-27 RX ADMIN — IRON SUCROSE 200 MG: 20 INJECTION, SOLUTION INTRAVENOUS at 07:01

## 2023-01-27 NOTE — PLAN OF CARE
Discussed plan of care with pt. Addressed any and ongoing concerns. Pt denies    Problem: Adult Inpatient Plan of Care  Goal: Plan of Care Review  Outcome: Ongoing, Progressing  Flowsheets (Taken 1/27/2023 0801)  Plan of Care Reviewed With: patient  Goal: Patient-Specific Goal (Individualized)  Outcome: Ongoing, Progressing  Goal: Absence of Hospital-Acquired Illness or Injury  Outcome: Ongoing, Progressing  Intervention: Identify and Manage Fall Risk  Flowsheets (Taken 1/27/2023 0801)  Safety Promotion/Fall Prevention:   in recliner, wheels locked   room near unit station  Intervention: Prevent Infection  Flowsheets (Taken 1/27/2023 0801)  Infection Prevention:   hand hygiene promoted   equipment surfaces disinfected  Goal: Optimal Comfort and Wellbeing  Outcome: Ongoing, Progressing  Intervention: Monitor Pain and Promote Comfort  Flowsheets (Taken 1/27/2023 0801)  Pain Management Interventions: quiet environment facilitated  Intervention: Provide Person-Centered Care  Flowsheets (Taken 1/27/2023 0801)  Trust Relationship/Rapport:   reassurance provided   care explained   choices provided   thoughts/feelings acknowledged   emotional support provided   empathic listening provided   questions answered   questions encouraged

## 2023-01-31 ENCOUNTER — INFUSION (OUTPATIENT)
Dept: INFUSION THERAPY | Facility: HOSPITAL | Age: 48
End: 2023-01-31
Attending: NURSE PRACTITIONER
Payer: COMMERCIAL

## 2023-01-31 VITALS
OXYGEN SATURATION: 99 % | RESPIRATION RATE: 18 BRPM | HEART RATE: 74 BPM | SYSTOLIC BLOOD PRESSURE: 124 MMHG | TEMPERATURE: 97 F | DIASTOLIC BLOOD PRESSURE: 79 MMHG

## 2023-01-31 DIAGNOSIS — D50.0 IRON DEFICIENCY ANEMIA DUE TO CHRONIC BLOOD LOSS: Primary | ICD-10-CM

## 2023-01-31 PROCEDURE — 63600175 PHARM REV CODE 636 W HCPCS: Performed by: NURSE PRACTITIONER

## 2023-01-31 PROCEDURE — 96374 THER/PROPH/DIAG INJ IV PUSH: CPT

## 2023-01-31 RX ADMIN — IRON SUCROSE 200 MG: 20 INJECTION, SOLUTION INTRAVENOUS at 07:01

## 2023-01-31 NOTE — PLAN OF CARE
Discussed plan of care with pt. Addressed any and ongoing concerns. Pt denies   Problem: Adult Inpatient Plan of Care  Goal: Plan of Care Review  Outcome: Ongoing, Progressing  Flowsheets (Taken 1/31/2023 0721)  Plan of Care Reviewed With: patient  Goal: Patient-Specific Goal (Individualized)  Outcome: Ongoing, Progressing  Goal: Absence of Hospital-Acquired Illness or Injury  Outcome: Ongoing, Progressing  Intervention: Identify and Manage Fall Risk  Flowsheets (Taken 1/31/2023 0721)  Safety Promotion/Fall Prevention:   in recliner, wheels locked   room near unit station  Intervention: Prevent Infection  Flowsheets (Taken 1/31/2023 0721)  Infection Prevention:   hand hygiene promoted   equipment surfaces disinfected  Goal: Optimal Comfort and Wellbeing  Outcome: Ongoing, Progressing  Intervention: Monitor Pain and Promote Comfort  Flowsheets (Taken 1/31/2023 0721)  Pain Management Interventions: quiet environment facilitated  Intervention: Provide Person-Centered Care  Flowsheets (Taken 1/31/2023 0721)  Trust Relationship/Rapport:   care explained   reassurance provided   thoughts/feelings acknowledged   choices provided   emotional support provided   empathic listening provided   questions answered   questions encouraged

## 2023-02-02 ENCOUNTER — OCCUPATIONAL HEALTH (OUTPATIENT)
Dept: URGENT CARE | Facility: CLINIC | Age: 48
End: 2023-02-02
Payer: COMMERCIAL

## 2023-02-02 ENCOUNTER — TELEPHONE (OUTPATIENT)
Dept: HEMATOLOGY/ONCOLOGY | Facility: CLINIC | Age: 48
End: 2023-02-02
Payer: COMMERCIAL

## 2023-02-02 DIAGNOSIS — R09.81 SINUS CONGESTION: Primary | ICD-10-CM

## 2023-02-02 LAB
CTP QC/QA: YES
SARS-COV-2 AG RESP QL IA.RAPID: NEGATIVE

## 2023-02-02 PROCEDURE — 87811 SARS CORONAVIRUS 2 ANTIGEN POCT, MANUAL READ: ICD-10-PCS | Mod: QW,S$GLB,, | Performed by: EMERGENCY MEDICINE

## 2023-02-02 PROCEDURE — 87811 SARS-COV-2 COVID19 W/OPTIC: CPT | Mod: QW,S$GLB,, | Performed by: EMERGENCY MEDICINE

## 2023-02-02 NOTE — PROGRESS NOTES
Please make sure patient has a f/u with cardiology to review results of there EKG.  Brianna Gonzales'

## 2023-02-02 NOTE — TELEPHONE ENCOUNTER
Message has been sent to [facility] in reference to reaching out to get the pt scheduled.

## 2023-02-02 NOTE — PATIENT INSTRUCTIONS
Your test was NEGATIVE for COVID-19 (coronavirus).      You may leave home and/or return to work when the following conditions are met:  24 hours fever free without fever-reducing medications AND  Improved symptoms  You are fully vaccinated or have not had close contact with someone with COVID-19 (within 6 feet for 15 minutes or more)    If you are fully vaccinated and had a close contact, there is no need for quarantine, unless you develop symptoms.      If you are not fully vaccinated and had a close contact:  Retest at 5 to 7 days post-exposure  If possible, it is recommended that you quarantine for 5 days from the time of contact regardless of your test status.  A mask should be worn indoors post quarantine.    If your symptoms worsen or if you have any other concerns, please contact [facility] at [phone].     Sincerely,    Kristal St MA

## 2023-02-02 NOTE — TELEPHONE ENCOUNTER
----- Message from Brianna Jones NP sent at 2/2/2023 10:07 AM CST -----  Please make sure patient has a f/u with cardiology to review results of there EKG.  Brianna Gonzales'

## 2023-02-03 ENCOUNTER — OFFICE VISIT (OUTPATIENT)
Dept: URGENT CARE | Facility: CLINIC | Age: 48
End: 2023-02-03
Payer: COMMERCIAL

## 2023-02-03 VITALS
OXYGEN SATURATION: 95 % | RESPIRATION RATE: 18 BRPM | SYSTOLIC BLOOD PRESSURE: 143 MMHG | HEIGHT: 68 IN | DIASTOLIC BLOOD PRESSURE: 65 MMHG | WEIGHT: 216 LBS | HEART RATE: 94 BPM | TEMPERATURE: 99 F | BODY MASS INDEX: 32.74 KG/M2

## 2023-02-03 DIAGNOSIS — J06.9 VIRAL UPPER RESPIRATORY TRACT INFECTION WITH COUGH: ICD-10-CM

## 2023-02-03 DIAGNOSIS — R09.82 POST-NASAL DRIP: ICD-10-CM

## 2023-02-03 DIAGNOSIS — I10 HYPERTENSION, UNSPECIFIED TYPE: ICD-10-CM

## 2023-02-03 DIAGNOSIS — R05.9 COUGH, UNSPECIFIED TYPE: Primary | ICD-10-CM

## 2023-02-03 LAB
CTP QC/QA: YES
CTP QC/QA: YES
POC MOLECULAR INFLUENZA A AGN: NEGATIVE
POC MOLECULAR INFLUENZA B AGN: NEGATIVE
SARS-COV-2 AG RESP QL IA.RAPID: NEGATIVE

## 2023-02-03 PROCEDURE — 3078F DIAST BP <80 MM HG: CPT | Mod: CPTII,S$GLB,, | Performed by: NURSE PRACTITIONER

## 2023-02-03 PROCEDURE — 87502 INFLUENZA DNA AMP PROBE: CPT | Mod: QW,S$GLB,, | Performed by: NURSE PRACTITIONER

## 2023-02-03 PROCEDURE — 1160F PR REVIEW ALL MEDS BY PRESCRIBER/CLIN PHARMACIST DOCUMENTED: ICD-10-PCS | Mod: CPTII,S$GLB,, | Performed by: NURSE PRACTITIONER

## 2023-02-03 PROCEDURE — 3078F PR MOST RECENT DIASTOLIC BLOOD PRESSURE < 80 MM HG: ICD-10-PCS | Mod: CPTII,S$GLB,, | Performed by: NURSE PRACTITIONER

## 2023-02-03 PROCEDURE — 1159F MED LIST DOCD IN RCRD: CPT | Mod: CPTII,S$GLB,, | Performed by: NURSE PRACTITIONER

## 2023-02-03 PROCEDURE — 3077F SYST BP >= 140 MM HG: CPT | Mod: CPTII,S$GLB,, | Performed by: NURSE PRACTITIONER

## 2023-02-03 PROCEDURE — 87502 POCT INFLUENZA A/B MOLECULAR: ICD-10-PCS | Mod: QW,S$GLB,, | Performed by: NURSE PRACTITIONER

## 2023-02-03 PROCEDURE — 3077F PR MOST RECENT SYSTOLIC BLOOD PRESSURE >= 140 MM HG: ICD-10-PCS | Mod: CPTII,S$GLB,, | Performed by: NURSE PRACTITIONER

## 2023-02-03 PROCEDURE — 1160F RVW MEDS BY RX/DR IN RCRD: CPT | Mod: CPTII,S$GLB,, | Performed by: NURSE PRACTITIONER

## 2023-02-03 PROCEDURE — 3008F PR BODY MASS INDEX (BMI) DOCUMENTED: ICD-10-PCS | Mod: CPTII,S$GLB,, | Performed by: NURSE PRACTITIONER

## 2023-02-03 PROCEDURE — 3008F BODY MASS INDEX DOCD: CPT | Mod: CPTII,S$GLB,, | Performed by: NURSE PRACTITIONER

## 2023-02-03 PROCEDURE — 1159F PR MEDICATION LIST DOCUMENTED IN MEDICAL RECORD: ICD-10-PCS | Mod: CPTII,S$GLB,, | Performed by: NURSE PRACTITIONER

## 2023-02-03 PROCEDURE — 99214 PR OFFICE/OUTPT VISIT, EST, LEVL IV, 30-39 MIN: ICD-10-PCS | Mod: S$GLB,,, | Performed by: NURSE PRACTITIONER

## 2023-02-03 PROCEDURE — 87811 SARS CORONAVIRUS 2 ANTIGEN POCT, MANUAL READ: ICD-10-PCS | Mod: QW,S$GLB,, | Performed by: NURSE PRACTITIONER

## 2023-02-03 PROCEDURE — 99214 OFFICE O/P EST MOD 30 MIN: CPT | Mod: S$GLB,,, | Performed by: NURSE PRACTITIONER

## 2023-02-03 PROCEDURE — 87811 SARS-COV-2 COVID19 W/OPTIC: CPT | Mod: QW,S$GLB,, | Performed by: NURSE PRACTITIONER

## 2023-02-03 RX ORDER — LEVOCETIRIZINE DIHYDROCHLORIDE 5 MG/1
5 TABLET, FILM COATED ORAL NIGHTLY
Qty: 30 TABLET | Refills: 11 | Status: SHIPPED | OUTPATIENT
Start: 2023-02-03 | End: 2024-02-03

## 2023-02-03 RX ORDER — PROMETHAZINE HYDROCHLORIDE AND DEXTROMETHORPHAN HYDROBROMIDE 6.25; 15 MG/5ML; MG/5ML
5 SYRUP ORAL NIGHTLY PRN
Qty: 90 ML | Refills: 0 | Status: SHIPPED | OUTPATIENT
Start: 2023-02-03 | End: 2023-02-03 | Stop reason: RX

## 2023-02-03 RX ORDER — BENZONATATE 100 MG/1
200 CAPSULE ORAL 3 TIMES DAILY PRN
Qty: 30 CAPSULE | Refills: 0 | Status: SHIPPED | OUTPATIENT
Start: 2023-02-03 | End: 2023-02-13

## 2023-02-03 RX ORDER — FLUTICASONE PROPIONATE 50 MCG
2 SPRAY, SUSPENSION (ML) NASAL DAILY
Qty: 16 G | Refills: 0 | Status: SHIPPED | OUTPATIENT
Start: 2023-02-03 | End: 2023-03-05

## 2023-02-03 NOTE — PATIENT INSTRUCTIONS
PLEASE READ YOUR DISCHARGE INSTRUCTIONS ENTIRELY AS IT CONTAINS IMPORTANT INFORMATION.    Please drink plenty of fluids.    Please get plenty of rest.    Please return here or go to the Emergency Department for any concerns or worsening of condition.    Please take an over the counter antihistamine medication (Allegra/Claritin/Zyrtec) of your choice as directed for allergy symptoms and/or runny nose and postnasal drip.    Try an over the counter decongestant for sinus pressure/ear pressure, congestion symptoms like Mucinex D or Sudafed or Phenylephrine. You buy this behind the pharmacy counter.    If you do have Hypertension or palpitations, it is safe to take Coricidin HBP for relief of sinus symptoms.    Certain OTC cough and cold medications may raise your blood pressure. To keep your blood pressure regulated avoid over-the-counter decongestants and multisymptom cold remedies that contain decongestants -- such as pseudoephedrine, ephedrine, phenylephrine, naphazoline and oxymetazoline. Also, check the label for high sodium content, which can also raise blood pressure.       Tylenol or ibuprofen can also be used as directed for pain and fever unless you have an allergy to them or medical condition such as stomach ulcers, kidney or liver disease or blood thinners etc for which you should not be taking these type of medications.     Sore throat recommendations: Warm fluids, warm salt water gargles, throat lozenges, tea, honey, soup, rest, hydration.    Use over the counter Flonase or Nasocort: one spray each nostril twice daily OR two sprays each nostril once daily until nares dry out, unless you have Glaucoma.   Can also supplement with nasal saline rinse.    Sinus rinses DO NOT USE TAP WATER, if you must, water must be at a rolling boil for 1 minute, let it cool, then use.  May use distilled water, or over the counter nasal saline rinses.  Jalen's vapor rub in shower to help open nasal passages.  May use nasal gel  to keep passages moisturized.  May use nasal saline sprays during the day for added relief of congestion.   For those who go to the gym, please do not use the sauna or steam room now to clear sinuses.    Cough     Rest and fluids are important  Can use honey with izabela to soothe your throat    Robitussin or Delsyum for cough suppressant for dry cough.    Mucinex DM or products containing Guaifenesin or Dextromethorphan for expectorant (wet cough).    Take prescription cough meds (pills) as prescribed; take prescription cough syrup at night as needed for cough.  Do not take both the prescribed cough pills and syrup at the same time or within 6 hours of each other.  Do not take the cough syrup with any other sedative medication as it can can cause drowsiness. Do not operate any heavy machinery, drink or drive while taking the cough syrup.     Please follow up with your primary care doctor or specialist in the next 48-72hrs as needed and if no improvement    If you smoke, please stop smoking.    Please return or see your primary care doctor if you develop new or worsening symptoms.     Lastly, good hand washing and cough hygiene (cough into your elbow) will help prevent the spread of the illness. A general rule is that you are no longer contagious once you have been without a fever for over 24 hours without requiring fever reducing medications.     Elevated Blood Pressure  Your blood pressure was elevated during your visit to the urgent care.  It was not so high that immediate care was needed but it is recommended that you monitor your blood pressure over the next week or two to make sure that it is not staying elevated.  Please have your blood pressure taken 2-3 times daily at different times of the day.  Write all of those blood pressures down and record the time that they were taken.  Keep all that information and take it with you to see your Primary Care Physician.  If your blood pressure is consistently above  140/90 you will need to follow up with your PCP more quickly      Please arrange follow up with your primary medical clinic as soon as possible. You must understand that you've received an Urgent Care treatment only and that you may be released before all of your medical problems are known or treated. You, the patient, will arrange for follow up as instructed. If your symptoms worsen or fail to improve you should go to the Emergency Room.

## 2023-02-03 NOTE — PROGRESS NOTES
"Subjective:       Patient ID: Mer Priest is a 47 y.o. female.    Vitals:  height is 5' 8" (1.727 m) and weight is 98 kg (216 lb). Her tympanic temperature is 98.8 °F (37.1 °C). Her blood pressure is 143/65 (abnormal) and her pulse is 94. Her respiration is 18 and oxygen saturation is 95%.     Chief Complaint: Cough (x5days)      Mer Priest is a 47 year old  female whom presents to urgent care today w/cough, congestion sore throat, runny nose, chills, bodyache, fatigue x5 days. Pt unknown of fever. Pt states that she had a neg covid test done @ [facility] 2/2/23. Pt states that she would like a flu & poss strep test today. Patient has tried Advil and Nyquil with minimal relief.  Pt states sick contacts in the pass few days.       Cough  This is a new problem. The current episode started in the past 7 days. The problem has been unchanged. The problem occurs constantly. The cough is Productive of sputum. Associated symptoms include chills, ear pain, myalgias, nasal congestion, postnasal drip, rhinorrhea and a sore throat. Pertinent negatives include no chest pain, ear congestion, fever, headaches, heartburn, hemoptysis, rash, shortness of breath, sweats, weight loss or wheezing. She has tried rest, OTC cough suppressant and body position changes for the symptoms. The treatment provided no relief. There is no history of asthma, bronchitis or pneumonia.     Constitution: Positive for chills. Negative for fever.   HENT:  Positive for ear pain, postnasal drip and sore throat.    Cardiovascular:  Negative for chest pain.   Respiratory:  Positive for cough. Negative for bloody sputum, shortness of breath and wheezing.    Gastrointestinal:  Negative for heartburn.   Musculoskeletal:  Positive for muscle ache.   Skin:  Negative for rash.   Neurological:  Negative for headaches.     Objective:      Physical Exam   Constitutional: She is oriented to person, place, and time. She appears well-developed. She is cooperative.  " Non-toxic appearance. She appears ill. No distress.   HENT:   Head: Normocephalic and atraumatic.   Ears:   Right Ear: Hearing, tympanic membrane, external ear and ear canal normal.   Left Ear: Hearing, external ear and ear canal normal. impacted cerumen  Nose: Congestion present. No mucosal edema, rhinorrhea or nasal deformity. No epistaxis. Right sinus exhibits maxillary sinus tenderness. Right sinus exhibits no frontal sinus tenderness. Left sinus exhibits maxillary sinus tenderness. Left sinus exhibits no frontal sinus tenderness.   Mouth/Throat: Uvula is midline and mucous membranes are normal. Mucous membranes are moist. No trismus in the jaw. Normal dentition. No uvula swelling. Posterior oropharyngeal erythema and cobblestoning present. No oropharyngeal exudate or posterior oropharyngeal edema. Oropharynx is clear.   +PND      Comments: +PND  Eyes: Conjunctivae and lids are normal. Pupils are equal, round, and reactive to light. No scleral icterus.   Neck: Trachea normal and phonation normal. Neck supple. No edema present. No erythema present. No neck rigidity present.   Cardiovascular: Normal rate, regular rhythm, normal heart sounds and normal pulses.   Pulmonary/Chest: Effort normal and breath sounds normal. No respiratory distress. She has no decreased breath sounds. She has no rhonchi.   Abdominal: Normal appearance and bowel sounds are normal. She exhibits no distension and no abdominal bruit. protuberant There is no abdominal tenderness.   Musculoskeletal: Normal range of motion.         General: No deformity. Normal range of motion.   Neurological: She is alert and oriented to person, place, and time. She exhibits normal muscle tone. Coordination normal.   Skin: Skin is warm, dry, intact, not diaphoretic and not pale.   Psychiatric: Her speech is normal and behavior is normal. Judgment and thought content normal.   Nursing note and vitals reviewed.      Results for orders placed or performed in visit  on 02/03/23   POCT Influenza A/B MOLECULAR   Result Value Ref Range    POC Molecular Influenza A Ag Negative Negative, Not Reported    POC Molecular Influenza B Ag Negative Negative, Not Reported     Acceptable Yes    SARS Coronavirus 2 Antigen, POCT Manual Read   Result Value Ref Range    SARS Coronavirus 2 Antigen Negative Negative     Acceptable Yes        Assessment:       1. Cough, unspecified type    2. Viral upper respiratory tract infection with cough    3. Post-nasal drip    4. Hypertension, unspecified type          Plan:         Cough, unspecified type  -     POCT Influenza A/B MOLECULAR  -     SARS Coronavirus 2 Antigen, POCT Manual Read  -     benzonatate (TESSALON) 100 MG capsule; Take 2 capsules (200 mg total) by mouth 3 (three) times daily as needed for Cough (Do not take more than 6 capsules in a 24 hour period.).  Dispense: 30 capsule; Refill: 0    Viral upper respiratory tract infection with cough  -     levocetirizine (XYZAL) 5 MG tablet; Take 1 tablet (5 mg total) by mouth every evening.  Dispense: 30 tablet; Refill: 11  -     benzonatate (TESSALON) 100 MG capsule; Take 2 capsules (200 mg total) by mouth 3 (three) times daily as needed for Cough (Do not take more than 6 capsules in a 24 hour period.).  Dispense: 30 capsule; Refill: 0  -     dexchlorphen-phenylephrine-DM 1-5-10 mg/5 mL Syrp; Take 10 mLs by mouth every 6 to 8 hours as needed (cough/congestion).  Dispense: 140 mL; Refill: 0    Post-nasal drip  -     fluticasone propionate (FLONASE) 50 mcg/actuation nasal spray; 2 sprays (100 mcg total) by Each Nostril route once daily.  Dispense: 16 g; Refill: 0  -     levocetirizine (XYZAL) 5 MG tablet; Take 1 tablet (5 mg total) by mouth every evening.  Dispense: 30 tablet; Refill: 11  -     dexchlorphen-phenylephrine-DM 1-5-10 mg/5 mL Syrp; Take 10 mLs by mouth every 6 to 8 hours as needed (cough/congestion).  Dispense: 140 mL; Refill: 0    Hypertension, unspecified  type    Other orders  -     Discontinue: promethazine-dextromethorphan (PROMETHAZINE-DM) 6.25-15 mg/5 mL Syrp; Take 5 mLs by mouth nightly as needed (cough).  Dispense: 90 mL; Refill: 0       Previous encounters and labs independently reviewed/interpreted. Discussed negative results with patient. Patient verbalized understanding. Educated patient on viral vs bacterial sinus infection/upper respiratory infection. Advised patient to begin Poly tussin DM, tessalon,  flonase, and normal saline nasal spray for symptom relief. If symptoms do not resolve, return to clinic for further evaluation. Patient vitals stable. Patient in no acute respiratory distress. Discussed the importance of a low sodium/ heart healthy diet and exercise to achieve overall optimal health.Discussed the possible side effects of certain OTC medications as it pertains to hypertension. Patient instructed to keep a blood pressure log and bring in to her next appointment with her primary care provider. Patient instructed to follow up sooner if her blood pressure is consistently greater than 140/90. Discussed discharge instructions with patient, she has no further questions at this time. Patient exits exam room in no distress.            Patient Instructions   PLEASE READ YOUR DISCHARGE INSTRUCTIONS ENTIRELY AS IT CONTAINS IMPORTANT INFORMATION.    Please drink plenty of fluids.    Please get plenty of rest.    Please return here or go to the Emergency Department for any concerns or worsening of condition.    Please take an over the counter antihistamine medication (Allegra/Claritin/Zyrtec) of your choice as directed for allergy symptoms and/or runny nose and postnasal drip.    Try an over the counter decongestant for sinus pressure/ear pressure, congestion symptoms like Mucinex D or Sudafed or Phenylephrine. You buy this behind the pharmacy counter.    If you do have Hypertension or palpitations, it is safe to take Coricidin HBP for relief of sinus  symptoms.    Certain OTC cough and cold medications may raise your blood pressure. To keep your blood pressure regulated avoid over-the-counter decongestants and multisymptom cold remedies that contain decongestants -- such as pseudoephedrine, ephedrine, phenylephrine, naphazoline and oxymetazoline. Also, check the label for high sodium content, which can also raise blood pressure.       Tylenol or ibuprofen can also be used as directed for pain and fever unless you have an allergy to them or medical condition such as stomach ulcers, kidney or liver disease or blood thinners etc for which you should not be taking these type of medications.     Sore throat recommendations: Warm fluids, warm salt water gargles, throat lozenges, tea, honey, soup, rest, hydration.    Use over the counter Flonase or Nasocort: one spray each nostril twice daily OR two sprays each nostril once daily until nares dry out, unless you have Glaucoma.   Can also supplement with nasal saline rinse.    Sinus rinses DO NOT USE TAP WATER, if you must, water must be at a rolling boil for 1 minute, let it cool, then use.  May use distilled water, or over the counter nasal saline rinses.  Jalen's vapor rub in shower to help open nasal passages.  May use nasal gel to keep passages moisturized.  May use nasal saline sprays during the day for added relief of congestion.   For those who go to the gym, please do not use the sauna or steam room now to clear sinuses.    Cough     Rest and fluids are important  Can use honey with izabela to soothe your throat    Robitussin or Delsyum for cough suppressant for dry cough.    Mucinex DM or products containing Guaifenesin or Dextromethorphan for expectorant (wet cough).    Take prescription cough meds (pills) as prescribed; take prescription cough syrup at night as needed for cough.  Do not take both the prescribed cough pills and syrup at the same time or within 6 hours of each other.  Do not take the cough syrup  with any other sedative medication as it can can cause drowsiness. Do not operate any heavy machinery, drink or drive while taking the cough syrup.     Please follow up with your primary care doctor or specialist in the next 48-72hrs as needed and if no improvement    If you smoke, please stop smoking.    Please return or see your primary care doctor if you develop new or worsening symptoms.     Lastly, good hand washing and cough hygiene (cough into your elbow) will help prevent the spread of the illness. A general rule is that you are no longer contagious once you have been without a fever for over 24 hours without requiring fever reducing medications.     Elevated Blood Pressure  Your blood pressure was elevated during your visit to the urgent care.  It was not so high that immediate care was needed but it is recommended that you monitor your blood pressure over the next week or two to make sure that it is not staying elevated.  Please have your blood pressure taken 2-3 times daily at different times of the day.  Write all of those blood pressures down and record the time that they were taken.  Keep all that information and take it with you to see your Primary Care Physician.  If your blood pressure is consistently above 140/90 you will need to follow up with your PCP more quickly      Please arrange follow up with your primary medical clinic as soon as possible. You must understand that you've received an Urgent Care treatment only and that you may be released before all of your medical problems are known or treated. You, the patient, will arrange for follow up as instructed. If your symptoms worsen or fail to improve you should go to the Emergency Room.

## 2023-02-03 NOTE — LETTER
February 3, 2023      [facility]  [address], [address]  [place] [address]  Phone: [phone]       Patient: Mer Priest   YOB: 1975  Date of Visit: 02/03/2023    To Whom It May Concern:    Shana Priest  was at [facility] on 02/03/2023. The patient may return to work/school on 02/04/23 with no restrictions. If you have any questions or concerns, or if I can be of further assistance, please do not hesitate to contact me.    Sincerely,        Merly Keane NP

## 2023-02-06 ENCOUNTER — TELEPHONE (OUTPATIENT)
Dept: URGENT CARE | Facility: CLINIC | Age: 48
End: 2023-02-06
Payer: COMMERCIAL

## 2023-03-13 ENCOUNTER — LAB VISIT (OUTPATIENT)
Dept: LAB | Facility: HOSPITAL | Age: 48
End: 2023-03-13
Attending: NURSE PRACTITIONER
Payer: COMMERCIAL

## 2023-03-13 DIAGNOSIS — D50.0 IRON DEFICIENCY ANEMIA DUE TO CHRONIC BLOOD LOSS: ICD-10-CM

## 2023-03-13 LAB
ANION GAP SERPL CALC-SCNC: 10 MMOL/L (ref 8–16)
BASOPHILS # BLD AUTO: 0.05 K/UL (ref 0–0.2)
BASOPHILS NFR BLD: 0.5 % (ref 0–1.9)
BUN SERPL-MCNC: 15 MG/DL (ref 6–20)
CALCIUM SERPL-MCNC: 9.2 MG/DL (ref 8.7–10.5)
CHLORIDE SERPL-SCNC: 107 MMOL/L (ref 95–110)
CO2 SERPL-SCNC: 23 MMOL/L (ref 23–29)
CREAT SERPL-MCNC: 1.1 MG/DL (ref 0.5–1.4)
DIFFERENTIAL METHOD: ABNORMAL
EOSINOPHIL # BLD AUTO: 0.2 K/UL (ref 0–0.5)
EOSINOPHIL NFR BLD: 1.8 % (ref 0–8)
ERYTHROCYTE [DISTWIDTH] IN BLOOD BY AUTOMATED COUNT: 19.4 % (ref 11.5–14.5)
EST. GFR  (NO RACE VARIABLE): >60 ML/MIN/1.73 M^2
GLUCOSE SERPL-MCNC: 99 MG/DL (ref 70–110)
HCT VFR BLD AUTO: 41.2 % (ref 37–48.5)
HGB BLD-MCNC: 12.9 G/DL (ref 12–16)
IMM GRANULOCYTES # BLD AUTO: 0.03 K/UL (ref 0–0.04)
IMM GRANULOCYTES NFR BLD AUTO: 0.3 % (ref 0–0.5)
LYMPHOCYTES # BLD AUTO: 2 K/UL (ref 1–4.8)
LYMPHOCYTES NFR BLD: 19.6 % (ref 18–48)
MCH RBC QN AUTO: 27.8 PG (ref 27–31)
MCHC RBC AUTO-ENTMCNC: 31.3 G/DL (ref 32–36)
MCV RBC AUTO: 89 FL (ref 82–98)
MONOCYTES # BLD AUTO: 0.8 K/UL (ref 0.3–1)
MONOCYTES NFR BLD: 7.5 % (ref 4–15)
NEUTROPHILS # BLD AUTO: 7.1 K/UL (ref 1.8–7.7)
NEUTROPHILS NFR BLD: 70.3 % (ref 38–73)
NRBC BLD-RTO: 0 /100 WBC
PLATELET # BLD AUTO: 203 K/UL (ref 150–450)
PMV BLD AUTO: 11.5 FL (ref 9.2–12.9)
POTASSIUM SERPL-SCNC: 4.1 MMOL/L (ref 3.5–5.1)
RBC # BLD AUTO: 4.64 M/UL (ref 4–5.4)
SODIUM SERPL-SCNC: 140 MMOL/L (ref 136–145)
WBC # BLD AUTO: 10.08 K/UL (ref 3.9–12.7)

## 2023-03-13 PROCEDURE — 85025 COMPLETE CBC W/AUTO DIFF WBC: CPT | Performed by: NURSE PRACTITIONER

## 2023-03-13 PROCEDURE — 36415 COLL VENOUS BLD VENIPUNCTURE: CPT | Performed by: NURSE PRACTITIONER

## 2023-03-13 PROCEDURE — 80048 BASIC METABOLIC PNL TOTAL CA: CPT | Performed by: NURSE PRACTITIONER

## 2023-03-13 PROCEDURE — 84466 ASSAY OF TRANSFERRIN: CPT | Performed by: NURSE PRACTITIONER

## 2023-03-13 PROCEDURE — 82728 ASSAY OF FERRITIN: CPT | Performed by: NURSE PRACTITIONER

## 2023-03-14 LAB
FERRITIN SERPL-MCNC: 50 NG/ML (ref 20–300)
IRON SERPL-MCNC: 77 UG/DL (ref 30–160)
SATURATED IRON: 18 % (ref 20–50)
TOTAL IRON BINDING CAPACITY: 431 UG/DL (ref 250–450)
TRANSFERRIN SERPL-MCNC: 291 MG/DL (ref 200–375)

## 2023-03-16 ENCOUNTER — OFFICE VISIT (OUTPATIENT)
Dept: HEMATOLOGY/ONCOLOGY | Facility: CLINIC | Age: 48
End: 2023-03-16
Payer: COMMERCIAL

## 2023-03-16 DIAGNOSIS — E61.1 IRON DEFICIENCY: Primary | ICD-10-CM

## 2023-03-16 DIAGNOSIS — Z86.2 HISTORY OF IRON DEFICIENCY ANEMIA: ICD-10-CM

## 2023-03-16 DIAGNOSIS — N92.6 IRREGULAR PERIODS/MENSTRUAL CYCLES: ICD-10-CM

## 2023-03-16 PROCEDURE — 99214 OFFICE O/P EST MOD 30 MIN: CPT | Mod: 95,,, | Performed by: NURSE PRACTITIONER

## 2023-03-16 PROCEDURE — 99214 PR OFFICE/OUTPT VISIT, EST, LEVL IV, 30-39 MIN: ICD-10-PCS | Mod: 95,,, | Performed by: NURSE PRACTITIONER

## 2023-03-16 PROCEDURE — 1159F PR MEDICATION LIST DOCUMENTED IN MEDICAL RECORD: ICD-10-PCS | Mod: CPTII,95,, | Performed by: NURSE PRACTITIONER

## 2023-03-16 PROCEDURE — 1160F PR REVIEW ALL MEDS BY PRESCRIBER/CLIN PHARMACIST DOCUMENTED: ICD-10-PCS | Mod: CPTII,95,, | Performed by: NURSE PRACTITIONER

## 2023-03-16 PROCEDURE — 1159F MED LIST DOCD IN RCRD: CPT | Mod: CPTII,95,, | Performed by: NURSE PRACTITIONER

## 2023-03-16 PROCEDURE — 1160F RVW MEDS BY RX/DR IN RCRD: CPT | Mod: CPTII,95,, | Performed by: NURSE PRACTITIONER

## 2023-03-16 NOTE — PROGRESS NOTES
The patient location is: work  The chief complaint leading to consultation is: lab discussion    Visit type: audiovisual    Face to Face time with patient: 20  30 minutes of total time spent on the encounter, which includes face to face time and non-face to face time preparing to see the patient (eg, review of tests), Obtaining and/or reviewing separately obtained history, Documenting clinical information in the electronic or other health record, Independently interpreting results (not separately reported) and communicating results to the patient/family/caregiver, or Care coordination (not separately reported).     Each patient to whom he or she provides medical services by telemedicine is:  (1) informed of the relationship between the physician and patient and the respective role of any other health care provider with respect to management of the patient; and (2) notified that he or she may decline to receive medical services by telemedicine and may withdraw from such care at any time.    Patient ID: Mer Priest is a 47 y.o. female.    Chief Complaint: lab discussion    HPI:   Patient is a 47 year old female who presents today as a new patient for further evaluation and recommendations of RADHA due to heavy menstrual cycles.  Labs and IV venofer 200 mg IV push injections x 5 done with last dose on 10/10/2022.  She presents today to evaluate response.  Continues to be anemic; however improved with low ferritin however improved 14 (5).     Cardiac MRI Repaired tetralogy of Fallot. Enlarged with dilated right ventricle and right ventricular outflow tract. Mildly reduced right and left left ventricular function.  There is no mass or delayed enhancement of the left ventricular myocardium. Small diverticulum of the anterior septum. There is late gadolinium enhancement of the right ventricular outflow tract presumably related to patch repair.  No pericardial effusion.     Severely dilated right ventricular outflow tract.  Normal pulmonary artery without branch stenosis. All pulmonary veins drain normally into the left atrium. 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 Normal head and neck vessels.       2021 Mammogram no evidence of malignancy                     PAP - HPV not detected.       2021 Endometrial biopsy - chronic non specific endometritis     Uterus is 2x's the size it should be and has fibroids.  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  Presents today to evaluate response.  Last menstrual cycle in 2023 and was very very light.  Good response to IV iron and is no longer anemic.  Has not followed up with cardiology as of yet.       Social History     Socioeconomic History    Marital status: Single   Tobacco Use    Smoking status: Former     Packs/day: 0.50     Types: Cigarettes     Quit date: 2022     Years since quittin.8    Smokeless tobacco: Never       Family History   Problem Relation Age of Onset    Prostate cancer Father     Ovarian cancer Cousin        Past Surgical History:   Procedure Laterality Date    BILATERAL TUBAL LIGATION      TETRALOGY OF FALLOT REPAIR         Past Medical History:   Diagnosis Date    Heart disease     Hypertension     Syphilis 2020       Review of Systems   Constitutional:  Positive for unexpected weight change. Negative for appetite change.   HENT:  Negative for mouth sores.    Eyes:  Negative for visual disturbance.   Respiratory:  Positive for cough. Negative for shortness of breath.    Cardiovascular:  Negative for chest pain.   Gastrointestinal:  Negative for abdominal pain and diarrhea.   Genitourinary:  Negative for frequency.   Musculoskeletal:  Negative for back pain.   Skin:  Negative for rash.   Neurological:  Negative for headaches.   Hematological:  Negative for adenopathy.    Psychiatric/Behavioral:  The patient is nervous/anxious.         Medication List with Changes/Refills   Current Medications    AMLODIPINE (NORVASC) 5 MG TABLET    Take 1 tablet by mouth every day    ASPIRIN (ECOTRIN) 81 MG EC TABLET    Take 1 tablet by mouth once daily.    DEXCHLORPHEN-PHENYLEPHRINE-DM 1-5-10 MG/5 ML SYRP    Take 10 mLs by mouth every 6 to 8 hours as needed (cough/congestion).    GABAPENTIN (NEURONTIN) 300 MG CAPSULE    Take 1 capsule by mouth 3 (three) times daily.    HYDROCHLOROTHIAZIDE (MICROZIDE) 12.5 MG CAPSULE    Take 1 capsule (12.5 mg total) by mouth once daily.    LEVOCETIRIZINE (XYZAL) 5 MG TABLET    Take 1 tablet (5 mg total) by mouth every evening.    METOPROLOL SUCCINATE (TOPROL-XL) 50 MG 24 HR TABLET    Take 1 tablet by mouth every day        Objective:   There were no vitals filed for this visit.    Physical Exam  Constitutional:       Appearance: Normal appearance.   Pulmonary:      Effort: Pulmonary effort is normal.   Neurological:      Mental Status: She is alert and oriented to person, place, and time.   Psychiatric:         Mood and Affect: Mood normal.         Behavior: Behavior normal.         Thought Content: Thought content normal.         Judgment: Judgment normal.       Assessment:     Problem List Items Addressed This Visit    None  Visit Diagnoses       Iron deficiency    -  Primary    History of iron deficiency anemia        Irregular periods/menstrual cycles                Lab Results   Component Value Date    WBC 10.08 03/13/2023    RBC 4.64 03/13/2023    HGB 12.9 03/13/2023    HCT 41.2 03/13/2023    MCV 89 03/13/2023    MCH 27.8 03/13/2023    MCHC 31.3 (L) 03/13/2023    RDW 19.4 (H) 03/13/2023     03/13/2023    MPV 11.5 03/13/2023    GRAN 7.1 03/13/2023    GRAN 70.3 03/13/2023    LYMPH 2.0 03/13/2023    LYMPH 19.6 03/13/2023    MONO 0.8 03/13/2023    MONO 7.5 03/13/2023    EOS 0.2 03/13/2023    BASO 0.05 03/13/2023    EOSINOPHIL 1.8 03/13/2023    BASOPHIL  0.5 03/13/2023      Lab Results   Component Value Date     03/13/2023    K 4.1 03/13/2023     03/13/2023    CO2 23 03/13/2023    BUN 15 03/13/2023    CREATININE 1.1 03/13/2023    CALCIUM 9.2 03/13/2023    ANIONGAP 10 03/13/2023     Lab Results   Component Value Date    IRON 77 03/13/2023    TRANSFERRIN 291 03/13/2023    TIBC 431 03/13/2023    FESATURATED 18 (L) 03/13/2023      Lab Results   Component Value Date    FERRITIN 50 03/13/2023     Anemia resolved.  No need for additional f/u.  Patient knows to f/u if menstrual cycle blood loss increased or if s/s of anemia return.  She will f/u with cardiology at [facility].     Plan:   Iron deficiency    History of iron deficiency anemia    Irregular periods/menstrual cycles        Collaborating Provider:  Dr. Martin Burciaga    Thank You,  Earl Jones, KOKIP-C  Hematology Oncology

## 2023-05-02 ENCOUNTER — OFFICE VISIT (OUTPATIENT)
Dept: GASTROENTEROLOGY | Facility: CLINIC | Age: 48
End: 2023-05-02
Payer: COMMERCIAL

## 2023-05-02 ENCOUNTER — TELEPHONE (OUTPATIENT)
Dept: HEPATOLOGY | Facility: CLINIC | Age: 48
End: 2023-05-02
Payer: COMMERCIAL

## 2023-05-02 ENCOUNTER — PATIENT MESSAGE (OUTPATIENT)
Dept: HEPATOLOGY | Facility: CLINIC | Age: 48
End: 2023-05-02
Payer: COMMERCIAL

## 2023-05-02 ENCOUNTER — HOSPITAL ENCOUNTER (OUTPATIENT)
Dept: RADIOLOGY | Facility: HOSPITAL | Age: 48
Discharge: HOME | End: 2023-05-02
Attending: NURSE PRACTITIONER
Payer: COMMERCIAL

## 2023-05-02 VITALS
BODY MASS INDEX: 34.68 KG/M2 | HEART RATE: 73 BPM | WEIGHT: 228.81 LBS | DIASTOLIC BLOOD PRESSURE: 90 MMHG | SYSTOLIC BLOOD PRESSURE: 138 MMHG | HEIGHT: 68 IN

## 2023-05-02 DIAGNOSIS — R14.0 BLOATING: ICD-10-CM

## 2023-05-02 DIAGNOSIS — D50.0 IRON DEFICIENCY ANEMIA DUE TO CHRONIC BLOOD LOSS: ICD-10-CM

## 2023-05-02 DIAGNOSIS — K59.09 CHRONIC CONSTIPATION: ICD-10-CM

## 2023-05-02 DIAGNOSIS — R10.84 GENERALIZED ABDOMINAL PAIN: ICD-10-CM

## 2023-05-02 DIAGNOSIS — R10.84 GENERALIZED ABDOMINAL PAIN: Primary | ICD-10-CM

## 2023-05-02 DIAGNOSIS — Z12.11 SCREENING FOR MALIGNANT NEOPLASM OF COLON: ICD-10-CM

## 2023-05-02 PROCEDURE — 74019 XR ABDOMEN FLAT AND ERECT: ICD-10-PCS | Mod: 26,,, | Performed by: RADIOLOGY

## 2023-05-02 PROCEDURE — 74019 RADEX ABDOMEN 2 VIEWS: CPT | Mod: TC

## 2023-05-02 PROCEDURE — 99999 PR PBB SHADOW E&M-EST. PATIENT-LVL IV: ICD-10-PCS | Mod: PBBFAC,,, | Performed by: NURSE PRACTITIONER

## 2023-05-02 PROCEDURE — 3075F PR MOST RECENT SYSTOLIC BLOOD PRESS GE 130-139MM HG: ICD-10-PCS | Mod: CPTII,S$GLB,, | Performed by: NURSE PRACTITIONER

## 2023-05-02 PROCEDURE — 3008F BODY MASS INDEX DOCD: CPT | Mod: CPTII,S$GLB,, | Performed by: NURSE PRACTITIONER

## 2023-05-02 PROCEDURE — 3008F PR BODY MASS INDEX (BMI) DOCUMENTED: ICD-10-PCS | Mod: CPTII,S$GLB,, | Performed by: NURSE PRACTITIONER

## 2023-05-02 PROCEDURE — 74019 RADEX ABDOMEN 2 VIEWS: CPT | Mod: 26,,, | Performed by: RADIOLOGY

## 2023-05-02 PROCEDURE — 3080F PR MOST RECENT DIASTOLIC BLOOD PRESSURE >= 90 MM HG: ICD-10-PCS | Mod: CPTII,S$GLB,, | Performed by: NURSE PRACTITIONER

## 2023-05-02 PROCEDURE — 1159F MED LIST DOCD IN RCRD: CPT | Mod: CPTII,S$GLB,, | Performed by: NURSE PRACTITIONER

## 2023-05-02 PROCEDURE — 3075F SYST BP GE 130 - 139MM HG: CPT | Mod: CPTII,S$GLB,, | Performed by: NURSE PRACTITIONER

## 2023-05-02 PROCEDURE — 99204 PR OFFICE/OUTPT VISIT, NEW, LEVL IV, 45-59 MIN: ICD-10-PCS | Mod: S$GLB,,, | Performed by: NURSE PRACTITIONER

## 2023-05-02 PROCEDURE — 3080F DIAST BP >= 90 MM HG: CPT | Mod: CPTII,S$GLB,, | Performed by: NURSE PRACTITIONER

## 2023-05-02 PROCEDURE — 1159F PR MEDICATION LIST DOCUMENTED IN MEDICAL RECORD: ICD-10-PCS | Mod: CPTII,S$GLB,, | Performed by: NURSE PRACTITIONER

## 2023-05-02 PROCEDURE — 99999 PR PBB SHADOW E&M-EST. PATIENT-LVL IV: CPT | Mod: PBBFAC,,, | Performed by: NURSE PRACTITIONER

## 2023-05-02 PROCEDURE — 99204 OFFICE O/P NEW MOD 45 MIN: CPT | Mod: S$GLB,,, | Performed by: NURSE PRACTITIONER

## 2023-05-02 NOTE — TELEPHONE ENCOUNTER
Message of the instructions sent to the patient via patient portal.     ----- Message from Lorraine Daley sent at 5/2/2023  3:50 PM CDT -----  Contact: [phone]  Patient needs more information for the miralax cleanse. She would like to instructions on how to take it. Please call her back at [phone] or message her through [facility] [facility]. Thanks

## 2023-05-02 NOTE — PROGRESS NOTES
Clinic Consult:  [facility] Consultation Note    Reason for Consult:  The primary encounter diagnosis was Generalized abdominal pain. Diagnoses of Bloating, Chronic constipation, Iron deficiency anemia due to chronic blood loss, and Screening for malignant neoplasm of colon were also pertinent to this visit.    PCP: Primary Doctor No   No address on file    HPI:  This is a 48 y.o. female here for evaluation of the above  Pt reports a long history of constipation that has been progressively worsening over the last few months  She has associated lower abdominal discomfort with bloating and feeling of incomplete evacuation.   She has a BM every 2-3 days   Has previously tried PRN OTC medication without significant change in symptoms  Of note, she has also been under the care of hematology for chronic RADHA which is thought to be caused by abnormal uterine bleeding.   No previous GI evaluation for the RADHA.         Review of Systems   Constitutional:  Negative for chills, fever, malaise/fatigue and weight loss.   Respiratory:  Negative for cough.    Cardiovascular:  Negative for chest pain.   Gastrointestinal:         Per HPI   Musculoskeletal:  Negative for myalgias.   Skin:  Negative for itching and rash.   Neurological:  Negative for headaches.   Psychiatric/Behavioral:  The patient is not nervous/anxious.      Medical History:   Past Medical History:   Diagnosis Date    Heart disease     Hypertension     Syphilis 2020       Surgical History:  Past Surgical History:   Procedure Laterality Date    BILATERAL TUBAL LIGATION      TETRALOGY OF FALLOT REPAIR         Family History:   Family History   Problem Relation Age of Onset    Prostate cancer Father     Ovarian cancer Cousin        Social History:   Social History     Tobacco Use    Smoking status: Former     Packs/day: 0.50     Types: Cigarettes     Quit date: 2022     Years since quittin.0    Smokeless tobacco: Never       Allergies:  "Reviewed    Home Medications:   Current Outpatient Medications on File Prior to Visit   Medication Sig Dispense Refill    amLODIPine (NORVASC) 5 MG tablet Take 1 tablet by mouth every day 90 tablet 3    aspirin (ECOTRIN) 81 MG EC tablet Take 1 tablet by mouth once daily.      dexchlorphen-phenylephrine-DM 1-5-10 mg/5 mL Syrp Take 10 mLs by mouth every 6 to 8 hours as needed (cough/congestion). 140 mL 0    hydroCHLOROthiazide (MICROZIDE) 12.5 mg capsule Take 1 capsule (12.5 mg total) by mouth once daily. 30 capsule 11    levocetirizine (XYZAL) 5 MG tablet Take 1 tablet (5 mg total) by mouth every evening. 30 tablet 11    metoprolol succinate (TOPROL-XL) 50 MG 24 hr tablet Take 1 tablet by mouth every day 30 tablet 5    gabapentin (NEURONTIN) 300 MG capsule Take 1 capsule by mouth 3 (three) times daily.       No current facility-administered medications on file prior to visit.       Physical Exam:  Vital Signs:  BP (!) 138/90 (BP Location: Left arm, Patient Position: Sitting, BP Method: Large (Manual))   Pulse 73   Ht 5' 8" (1.727 m)   Wt 103.8 kg (228 lb 13.4 oz)   BMI 34.79 kg/m²   Body mass index is 34.79 kg/m².  Physical Exam  Vitals reviewed.   Constitutional:       Appearance: She is well-developed.   HENT:      Head: Normocephalic.   Eyes:      General: No scleral icterus.  Cardiovascular:      Rate and Rhythm: Normal rate.   Pulmonary:      Effort: Pulmonary effort is normal.   Abdominal:      General: There is no distension.      Palpations: Abdomen is soft.   Musculoskeletal:         General: Normal range of motion.      Cervical back: Normal range of motion.   Skin:     General: Skin is dry.   Neurological:      Mental Status: She is alert and oriented to person, place, and time.       Labs: Pertinent labs reviewed.      Assessment:  1. Generalized abdominal pain    2. Bloating    3. Chronic constipation    4. Iron deficiency anemia due to chronic blood loss    5. Screening for malignant neoplasm of " colon         Recommendations:  I suspect the lower abdominal discomfort is related to the uncontrolled constipation.   - will get an x-ray today determine stool burden.  Pt will likely need a bowel purge followed by daily medication to treat the constipation  - will also plan for EGD and colonoscopy both for age appropriate screening for CRC and chronic RADHA.       Follow up to be determined by results of above.      Thank you so much for allowing me to participate in the care of SHERI Bailey

## 2023-05-03 ENCOUNTER — HOSPITAL ENCOUNTER (OUTPATIENT)
Dept: PREADMISSION TESTING | Facility: HOSPITAL | Age: 48
Discharge: HOME | End: 2023-05-03
Attending: INTERNAL MEDICINE
Payer: COMMERCIAL

## 2023-05-03 DIAGNOSIS — Z12.11 SCREENING FOR MALIGNANT NEOPLASM OF COLON: ICD-10-CM

## 2023-05-03 DIAGNOSIS — D50.0 IRON DEFICIENCY ANEMIA DUE TO CHRONIC BLOOD LOSS: ICD-10-CM

## 2023-07-05 ENCOUNTER — PATIENT MESSAGE (OUTPATIENT)
Dept: PREADMISSION TESTING | Facility: HOSPITAL | Age: 48
End: 2023-07-05
Payer: COMMERCIAL

## 2023-07-19 ENCOUNTER — HOSPITAL ENCOUNTER (OUTPATIENT)
Dept: PREADMISSION TESTING | Facility: HOSPITAL | Age: 48
Discharge: HOME | End: 2023-07-19
Attending: INTERNAL MEDICINE
Payer: COMMERCIAL

## 2023-07-19 DIAGNOSIS — Z76.89 ESTABLISHING CARE WITH NEW DOCTOR, ENCOUNTER FOR: Primary | ICD-10-CM

## 2023-07-21 ENCOUNTER — OFFICE VISIT (OUTPATIENT)
Dept: CARDIOLOGY | Facility: CLINIC | Age: 48
End: 2023-07-21
Payer: COMMERCIAL

## 2023-07-21 ENCOUNTER — HOSPITAL ENCOUNTER (OUTPATIENT)
Dept: CARDIOLOGY | Facility: HOSPITAL | Age: 48
Discharge: HOME | End: 2023-07-21
Attending: INTERNAL MEDICINE
Payer: COMMERCIAL

## 2023-07-21 VITALS — HEART RATE: 78 BPM | DIASTOLIC BLOOD PRESSURE: 90 MMHG | SYSTOLIC BLOOD PRESSURE: 145 MMHG | OXYGEN SATURATION: 97 %

## 2023-07-21 DIAGNOSIS — I37.1 NONRHEUMATIC PULMONARY VALVE INSUFFICIENCY: ICD-10-CM

## 2023-07-21 DIAGNOSIS — I10 PRIMARY HYPERTENSION: ICD-10-CM

## 2023-07-21 DIAGNOSIS — E66.09 CLASS 1 OBESITY DUE TO EXCESS CALORIES WITHOUT SERIOUS COMORBIDITY WITH BODY MASS INDEX (BMI) OF 31.0 TO 31.9 IN ADULT: ICD-10-CM

## 2023-07-21 DIAGNOSIS — Z76.89 ESTABLISHING CARE WITH NEW DOCTOR, ENCOUNTER FOR: ICD-10-CM

## 2023-07-21 DIAGNOSIS — I49.3 PVC'S (PREMATURE VENTRICULAR CONTRACTIONS): ICD-10-CM

## 2023-07-21 DIAGNOSIS — Z87.74 S/P REPAIR OF TETRALOGY OF FALLOT: Primary | ICD-10-CM

## 2023-07-21 DIAGNOSIS — Z76.89 ENCOUNTER TO ESTABLISH CARE: Primary | ICD-10-CM

## 2023-07-21 DIAGNOSIS — G47.30 SLEEP APNEA, UNSPECIFIED TYPE: ICD-10-CM

## 2023-07-21 DIAGNOSIS — R42 VERTIGO: ICD-10-CM

## 2023-07-21 DIAGNOSIS — R01.1 CARDIAC MURMUR: ICD-10-CM

## 2023-07-21 DIAGNOSIS — I45.10 RBBB: ICD-10-CM

## 2023-07-21 DIAGNOSIS — R06.09 DOE (DYSPNEA ON EXERTION): ICD-10-CM

## 2023-07-21 DIAGNOSIS — D50.0 IRON DEFICIENCY ANEMIA DUE TO CHRONIC BLOOD LOSS: ICD-10-CM

## 2023-07-21 PROBLEM — J98.4 PULMONARY INSUFFICIENCY: Status: ACTIVE | Noted: 2022-06-01

## 2023-07-21 PROBLEM — E66.811 CLASS 1 OBESITY DUE TO EXCESS CALORIES WITHOUT SERIOUS COMORBIDITY WITH BODY MASS INDEX (BMI) OF 31.0 TO 31.9 IN ADULT: Status: ACTIVE | Noted: 2022-05-13

## 2023-07-21 PROCEDURE — 3077F PR MOST RECENT SYSTOLIC BLOOD PRESSURE >= 140 MM HG: ICD-10-PCS | Mod: CPTII,S$GLB,, | Performed by: INTERNAL MEDICINE

## 2023-07-21 PROCEDURE — 3077F SYST BP >= 140 MM HG: CPT | Mod: CPTII,S$GLB,, | Performed by: INTERNAL MEDICINE

## 2023-07-21 PROCEDURE — 99204 PR OFFICE/OUTPT VISIT, NEW, LEVL IV, 45-59 MIN: ICD-10-PCS | Mod: S$GLB,,, | Performed by: INTERNAL MEDICINE

## 2023-07-21 PROCEDURE — 93010 ELECTROCARDIOGRAM REPORT: CPT | Mod: ,,, | Performed by: INTERNAL MEDICINE

## 2023-07-21 PROCEDURE — 99999 PR PBB SHADOW E&M-EST. PATIENT-LVL IV: CPT | Mod: PBBFAC,,, | Performed by: INTERNAL MEDICINE

## 2023-07-21 PROCEDURE — 99204 OFFICE O/P NEW MOD 45 MIN: CPT | Mod: S$GLB,,, | Performed by: INTERNAL MEDICINE

## 2023-07-21 PROCEDURE — 3080F PR MOST RECENT DIASTOLIC BLOOD PRESSURE >= 90 MM HG: ICD-10-PCS | Mod: CPTII,S$GLB,, | Performed by: INTERNAL MEDICINE

## 2023-07-21 PROCEDURE — 99999 PR PBB SHADOW E&M-EST. PATIENT-LVL IV: ICD-10-PCS | Mod: PBBFAC,,, | Performed by: INTERNAL MEDICINE

## 2023-07-21 PROCEDURE — 3080F DIAST BP >= 90 MM HG: CPT | Mod: CPTII,S$GLB,, | Performed by: INTERNAL MEDICINE

## 2023-07-21 PROCEDURE — 93005 ELECTROCARDIOGRAM TRACING: CPT

## 2023-07-21 PROCEDURE — 1160F PR REVIEW ALL MEDS BY PRESCRIBER/CLIN PHARMACIST DOCUMENTED: ICD-10-PCS | Mod: CPTII,S$GLB,, | Performed by: INTERNAL MEDICINE

## 2023-07-21 PROCEDURE — 93010 EKG 12-LEAD: ICD-10-PCS | Mod: ,,, | Performed by: INTERNAL MEDICINE

## 2023-07-21 PROCEDURE — 1159F MED LIST DOCD IN RCRD: CPT | Mod: CPTII,S$GLB,, | Performed by: INTERNAL MEDICINE

## 2023-07-21 PROCEDURE — 1160F RVW MEDS BY RX/DR IN RCRD: CPT | Mod: CPTII,S$GLB,, | Performed by: INTERNAL MEDICINE

## 2023-07-21 PROCEDURE — 1159F PR MEDICATION LIST DOCUMENTED IN MEDICAL RECORD: ICD-10-PCS | Mod: CPTII,S$GLB,, | Performed by: INTERNAL MEDICINE

## 2023-07-21 RX ORDER — MECLIZINE HYDROCHLORIDE 25 MG/1
25 TABLET ORAL 3 TIMES DAILY PRN
Qty: 30 TABLET | Refills: 0 | Status: SHIPPED | OUTPATIENT
Start: 2023-07-21

## 2023-07-21 RX ORDER — METOPROLOL SUCCINATE 50 MG/1
50 TABLET, EXTENDED RELEASE ORAL 2 TIMES DAILY
Qty: 180 TABLET | Refills: 3 | Status: SHIPPED | OUTPATIENT
Start: 2023-07-21 | End: 2023-07-21 | Stop reason: SDUPTHER

## 2023-07-21 RX ORDER — HYDROCHLOROTHIAZIDE 25 MG/1
25 TABLET ORAL DAILY
Qty: 90 TABLET | Refills: 1 | Status: SHIPPED | OUTPATIENT
Start: 2023-07-21 | End: 2023-09-20 | Stop reason: SDUPTHER

## 2023-07-21 RX ORDER — METOPROLOL SUCCINATE 50 MG/1
50 TABLET, EXTENDED RELEASE ORAL 2 TIMES DAILY
Qty: 180 TABLET | Refills: 3 | Status: SHIPPED | OUTPATIENT
Start: 2023-07-21 | End: 2024-07-20

## 2023-07-21 NOTE — PROGRESS NOTES
Subjective:   Patient ID:  Mer Priest is a 48 y.o. female who presents for cardiac consult of No chief complaint on file.      Referral by: Aaareferral Self  No address on file     Reason for consult:       HPI  The patient came in today for cardiac consult of No chief complaint on file.      Mer Priest is a 48 y.o. 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       Zonia Belcher MD at 08/15/2022- S/P repair of tetralogy of Fallot-1976  Severe Pulmonary insufficiency  Severe right ventricular enlargement  Severe right ventricular enlargement and free PI on echocardiogram  RV EDV index 214 mL/m² cardiac MRI 7/2022  Refer for pulmonic valve replacement  - Refer to Dr. 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 ECHO Jan 2023 with normal bi V function, mild RV enlargement with pressure overload, int CVP, PASP 43 mmHg.     BP now 140s/90s. If she moves too fast she feels more presyncope, but yesterday when she sitting an eating felt like vertigo but also thinks maybe anxiety. She lost her son 5 years ago and since March she has been having more symptoms of presyncope.     Patient is compliant with medications. 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       FH - CVD in [facility] - congenital disease     Results for orders placed during the hospital encounter of 01/23/23    Echo    Interpretation Summary  · The left ventricle is normal in size with concentric hypertrophy and normal systolic function.  · Moderate left atrial enlargement.  · There is abnormal septal wall motion.  There is systolic flattening of the interventricular septum consistent with right ventricle pressure overload.  · Intermediate central venous pressure (8 mmHg).  · The estimated PA systolic pressure is 43 mmHg.  · There is pulmonary hypertension.  · The estimated ejection fraction is 55%.  · Indeterminate left ventricular diastolic function.  · Mild right ventricular enlargement with normal right ventricular systolic function.  · Right atrial enlargement.      Cardiac MRI IMPRESSION:     Repaired tetralogy of Fallot with dilated right ventricle  mL (normal ), EDV/.15 mL/m2 (normal 57-95). Mildly decreased right ventricular function, RVEF =38%.     Mild left ventricular dilatation, mild to moderately reduced left ventricular function, LVEF =45%.     Small diverticulum of the anterior septum.     Late gadolinium enhancement at the right ventricular outflow tract likely related to patch repair.     Severely dilated right ventricular outflow tract with free pulmonic insufficiency.     This case was reviewed with Dr. Zonia Belcher of [facility].  Exam End: 22 12:50 Last Resulted: 22 09:24       No results found for this or any previous visit.      No results found for this or any previous visit.      No cardiac monitor results found for the past 12 months         Past Medical History:   Diagnosis Date    Heart disease     Hypertension     Syphilis 2020       Past Surgical History:   Procedure Laterality Date    BILATERAL TUBAL LIGATION      TETRALOGY OF FALLOT REPAIR         Social History     Tobacco Use    Smoking status: Former     Packs/day: 0.50     Types: Cigarettes     Quit date: 2022     Years since quittin.2    Smokeless tobacco: Never       Family History   Problem Relation Age of Onset    Prostate cancer Father     Ovarian cancer Cousin        Patient's Medications   New Prescriptions    MECLIZINE (ANTIVERT) 25 MG TABLET    Take 1 tablet (25 mg  total) by mouth 3 (three) times daily as needed for Dizziness.    METOPROLOL SUCCINATE (TOPROL-XL) 50 MG 24 HR TABLET    Take 1 tablet (50 mg total) by mouth 2 (two) times daily.   Previous Medications    AMLODIPINE (NORVASC) 5 MG TABLET    Take 1 tablet by mouth every day    ASPIRIN (ECOTRIN) 81 MG EC TABLET    Take 1 tablet by mouth once daily.    DEXCHLORPHEN-PHENYLEPHRINE-DM 1-5-10 MG/5 ML SYRP    Take 10 mLs by mouth every 6 to 8 hours as needed (cough/congestion).    GABAPENTIN (NEURONTIN) 300 MG CAPSULE    Take 1 capsule by mouth 3 (three) times daily.    LEVOCETIRIZINE (XYZAL) 5 MG TABLET    Take 1 tablet (5 mg total) by mouth every evening.   Modified Medications    No medications on file   Discontinued Medications    HYDROCHLOROTHIAZIDE (MICROZIDE) 12.5 MG CAPSULE    Take 1 capsule (12.5 mg total) by mouth once daily.    METOPROLOL SUCCINATE (TOPROL-XL) 50 MG 24 HR TABLET    Take 1 tablet by mouth every day       Review of Systems   Constitutional:  Positive for malaise/fatigue.   HENT: Negative.     Eyes: Negative.    Respiratory:  Positive for shortness of breath.    Cardiovascular:  Positive for chest pain and palpitations.   Gastrointestinal: Negative.    Genitourinary: Negative.    Musculoskeletal: Negative.    Skin: Negative.    Neurological:  Positive for dizziness and weakness.   Endo/Heme/Allergies: Negative.    Psychiatric/Behavioral: Negative.     All 12 systems otherwise negative.    Wt Readings from Last 3 Encounters:   05/02/23 103.8 kg (228 lb 13.4 oz)   02/03/23 98 kg (216 lb)   01/23/23 99.3 kg (219 lb)     Temp Readings from Last 3 Encounters:   02/03/23 98.8 °F (37.1 °C) (Tympanic)   01/31/23 97.4 °F (36.3 °C)   01/27/23 97.6 °F (36.4 °C)     BP Readings from Last 3 Encounters:   07/21/23 (!) 145/90   05/02/23 (!) 138/90   02/03/23 (!) 143/65     Pulse Readings from Last 3 Encounters:   07/21/23 78   05/02/23 73   02/03/23 94       BP (!) 145/90 (BP Location: Right arm, Patient Position:  Sitting)   Pulse 78   SpO2 97%     Objective:   Physical Exam  Vitals and nursing note reviewed.   Constitutional:       General: She is not in acute distress.     Appearance: She is well-developed. She is not diaphoretic.   HENT:      Head: Normocephalic and atraumatic.      Nose: Nose normal.   Eyes:      General: No scleral icterus.     Conjunctiva/sclera: Conjunctivae normal.   Neck:      Thyroid: No thyromegaly.      Vascular: No JVD.   Cardiovascular:      Rate and Rhythm: Normal rate and regular rhythm.      Heart sounds: S1 normal and S2 normal. Murmur heard.     No friction rub. No gallop. No S3 or S4 sounds.   Pulmonary:      Effort: Pulmonary effort is normal. No respiratory distress.      Breath sounds: Normal breath sounds. No stridor. No wheezing or rales.   Chest:      Chest wall: No tenderness.   Abdominal:      General: Bowel sounds are normal. There is no distension.      Palpations: Abdomen is soft. There is no mass.      Tenderness: There is no abdominal tenderness. There is no rebound.   Genitourinary:     Comments: Deferred  Musculoskeletal:         General: No tenderness or deformity. Normal range of motion.      Cervical back: Normal range of motion and neck supple.   Lymphadenopathy:      Cervical: No cervical adenopathy.   Skin:     General: Skin is warm and dry.      Coloration: Skin is not pale.      Findings: No erythema or rash.   Neurological:      Mental Status: She is alert and oriented to person, place, and time.      Motor: No abnormal muscle tone.      Coordination: Coordination normal.   Psychiatric:         Behavior: Behavior normal.         Thought Content: Thought content normal.         Judgment: Judgment normal.       Lab Results   Component Value Date     03/13/2023    K 4.1 03/13/2023     03/13/2023    CO2 23 03/13/2023    BUN 15 03/13/2023    CREATININE 1.1 03/13/2023    GLU 99 03/13/2023    HGBA1C 5.7 (H) 11/05/2021    WBC 10.08 03/13/2023    HGB 12.9  03/13/2023    HCT 41.2 03/13/2023    MCV 89 03/13/2023     03/13/2023    TSH 3.760 11/05/2021    CHOL 129 11/05/2021    HDL 38 (L) 11/05/2021    LDLCALC 73 11/05/2021    TRIG 92 11/05/2021         No results found for: BNP, INR       Assessment:      1. S/P repair of tetralogy of Fallot    2. RBBB    3. Iron deficiency anemia due to chronic blood loss    4. Class 1 obesity due to excess calories without serious comorbidity with body mass index (BMI) of 31.0 to 31.9 in adult    5. Cardiac murmur    6. Primary hypertension    7. Nonrheumatic pulmonary valve insufficiency    8. Vertigo    9. PVC's (premature ventricular contractions)    10. GUERRA (dyspnea on exertion)    11. Sleep apnea, unspecified type        Plan:         Bi V CHF, with PI; Congenital heart disease s/p Tetraology of Fallor repair - repaired at age 1, pulm HTN  - ECHO Jan 2023 with normal bi V function, mild RV enlargement with pressure overload, int CVP, PASP 43 mmHg.   - refer to adult congenital cardiologist - had prior appt but missed, will reorder     2. HTN, RBBB - elevated with PVCs  - order 7 day Vital monitor   - titrate meds  - inc HCTZ to 25mg,   - inc BB to 50mg BID    3. Fe def anemia  - cont tx per PCP/heme  - had infusions , considered hysterectomy but needs Pulmonic valve replaced     4. Obesity  - cont weight loss with diet/exercise     5. Vertigo, dizziness  - PRN Meclizine   - refer to ENT     6. DARLING sx  - refer to pul, sleep center     Thank you for allowing me to participate in this patient's care. Please do not hesitate to contact me with any questions or concerns. Consult note has been forwarded to the referral physician.

## 2023-07-25 ENCOUNTER — OFFICE VISIT (OUTPATIENT)
Dept: PULMONOLOGY | Facility: CLINIC | Age: 48
End: 2023-07-25
Payer: COMMERCIAL

## 2023-07-25 ENCOUNTER — HOSPITAL ENCOUNTER (OUTPATIENT)
Dept: CARDIOLOGY | Facility: HOSPITAL | Age: 48
Discharge: HOME | End: 2023-07-25
Attending: INTERNAL MEDICINE
Payer: COMMERCIAL

## 2023-07-25 VITALS
HEIGHT: 68 IN | SYSTOLIC BLOOD PRESSURE: 138 MMHG | DIASTOLIC BLOOD PRESSURE: 92 MMHG | OXYGEN SATURATION: 96 % | RESPIRATION RATE: 20 BRPM | WEIGHT: 224.19 LBS | BODY MASS INDEX: 33.98 KG/M2 | HEART RATE: 65 BPM

## 2023-07-25 DIAGNOSIS — I49.3 PVC'S (PREMATURE VENTRICULAR CONTRACTIONS): ICD-10-CM

## 2023-07-25 DIAGNOSIS — I45.10 RBBB: ICD-10-CM

## 2023-07-25 DIAGNOSIS — H81.10 BENIGN PAROXYSMAL POSITIONAL VERTIGO, UNSPECIFIED LATERALITY: ICD-10-CM

## 2023-07-25 DIAGNOSIS — K21.9 GERD WITHOUT ESOPHAGITIS: Primary | ICD-10-CM

## 2023-07-25 DIAGNOSIS — R06.09 DOE (DYSPNEA ON EXERTION): ICD-10-CM

## 2023-07-25 DIAGNOSIS — G47.30 SLEEP APNEA, UNSPECIFIED TYPE: ICD-10-CM

## 2023-07-25 PROCEDURE — 3080F PR MOST RECENT DIASTOLIC BLOOD PRESSURE >= 90 MM HG: ICD-10-PCS | Mod: CPTII,S$GLB,, | Performed by: INTERNAL MEDICINE

## 2023-07-25 PROCEDURE — 93248 CV CARDIAC MONITOR - 3-15 DAY ADULT (CUPID ONLY): ICD-10-PCS | Mod: ,,, | Performed by: INTERNAL MEDICINE

## 2023-07-25 PROCEDURE — 3008F BODY MASS INDEX DOCD: CPT | Mod: CPTII,S$GLB,, | Performed by: INTERNAL MEDICINE

## 2023-07-25 PROCEDURE — 99203 PR OFFICE/OUTPT VISIT, NEW, LEVL III, 30-44 MIN: ICD-10-PCS | Mod: S$GLB,,, | Performed by: INTERNAL MEDICINE

## 2023-07-25 PROCEDURE — 3008F PR BODY MASS INDEX (BMI) DOCUMENTED: ICD-10-PCS | Mod: CPTII,S$GLB,, | Performed by: INTERNAL MEDICINE

## 2023-07-25 PROCEDURE — 1160F PR REVIEW ALL MEDS BY PRESCRIBER/CLIN PHARMACIST DOCUMENTED: ICD-10-PCS | Mod: CPTII,S$GLB,, | Performed by: INTERNAL MEDICINE

## 2023-07-25 PROCEDURE — 1159F MED LIST DOCD IN RCRD: CPT | Mod: CPTII,S$GLB,, | Performed by: INTERNAL MEDICINE

## 2023-07-25 PROCEDURE — 99999 PR PBB SHADOW E&M-EST. PATIENT-LVL V: ICD-10-PCS | Mod: PBBFAC,,, | Performed by: INTERNAL MEDICINE

## 2023-07-25 PROCEDURE — 99999 PR PBB SHADOW E&M-EST. PATIENT-LVL V: CPT | Mod: PBBFAC,,, | Performed by: INTERNAL MEDICINE

## 2023-07-25 PROCEDURE — 3080F DIAST BP >= 90 MM HG: CPT | Mod: CPTII,S$GLB,, | Performed by: INTERNAL MEDICINE

## 2023-07-25 PROCEDURE — 3075F SYST BP GE 130 - 139MM HG: CPT | Mod: CPTII,S$GLB,, | Performed by: INTERNAL MEDICINE

## 2023-07-25 PROCEDURE — 1159F PR MEDICATION LIST DOCUMENTED IN MEDICAL RECORD: ICD-10-PCS | Mod: CPTII,S$GLB,, | Performed by: INTERNAL MEDICINE

## 2023-07-25 PROCEDURE — 93248 EXT ECG>7D<15D REV&INTERPJ: CPT | Mod: ,,, | Performed by: INTERNAL MEDICINE

## 2023-07-25 PROCEDURE — 1160F RVW MEDS BY RX/DR IN RCRD: CPT | Mod: CPTII,S$GLB,, | Performed by: INTERNAL MEDICINE

## 2023-07-25 PROCEDURE — 3075F PR MOST RECENT SYSTOLIC BLOOD PRESS GE 130-139MM HG: ICD-10-PCS | Mod: CPTII,S$GLB,, | Performed by: INTERNAL MEDICINE

## 2023-07-25 PROCEDURE — 99203 OFFICE O/P NEW LOW 30 MIN: CPT | Mod: S$GLB,,, | Performed by: INTERNAL MEDICINE

## 2023-07-25 RX ORDER — PANTOPRAZOLE SODIUM 40 MG/1
40 TABLET, DELAYED RELEASE ORAL DAILY
Qty: 90 TABLET | Refills: 3 | Status: SHIPPED | OUTPATIENT
Start: 2023-07-25 | End: 2023-12-14

## 2023-07-25 NOTE — PROGRESS NOTES
Subjective:      Patient ID: Mer Priest is a 48 y.o. female.    Chief Complaint: Dizziness, cough    HPI    48-year-old female with history of repaired tetralogy of Fallot at age 1, formally [facility] by Dr. Belcher in his now followed here at [facility].  She was seen Friday by Dr. King for complaints of episodic dizziness.  Today she had a cardiac monitor placed.  Review of her echo from earlier this year showed a mildly reduced ejection fraction and significant pulmonic insufficiency.  Estimated pulmonary pressure about 44.  She was referred here by Dr. King for pulmonary evaluation.  Patient describes steadily worsening dizziness beginning in about November initially just a few episodes but steadily worsening over time now with specific head positions she gets vertiginous.  She did have a COVID infection prior to that.  She was prescribed Antivert but has not taken this as of yet.  She has an appointment with ENT tomorrow.  She also complains of persistent cough which is worse after meals and at night generally nonproductive with lots of throat clearing and some dysphonia during the day.  GERD review of systems is grossly positive.  She denies any history of asthma.  No wheezing.  No complaints of dyspnea on exertion.  She denies excessive daytime sleepiness, witnessed apneas or loud snoring    .  Past Medical History:   Diagnosis Date    Heart disease     Hypertension     Syphilis 2020     Past Surgical History:   Procedure Laterality Date    BILATERAL TUBAL LIGATION      TETRALOGY OF FALLOT REPAIR       Social History     Tobacco Use    Smoking status: Former     Packs/day: 0.50     Types: Cigarettes     Quit date: 2022     Years since quittin.2    Smokeless tobacco: Never     Family History   Problem Relation Age of Onset    Prostate cancer Father     Ovarian cancer Cousin          Review of Systems as per HPI otherwise negative    Objective:     Physical Exam    Constitutional: She is oriented to person, place, and time. She appears well-developed. No distress.   HENT:   Head: Normocephalic.   Cardiovascular: Normal rate and regular rhythm.   Murmur heard.  Pulmonary/Chest: Normal expansion, symmetric chest wall expansion, effort normal and breath sounds normal.   Abdominal: Soft.   Musculoskeletal:         General: No edema.      Cervical back: Neck supple.   Neurological: She is alert and oriented to person, place, and time.   Psychiatric: She has a normal mood and affect.   Nursing note and vitals reviewed.       Assessment:     Positional vertigo  Cough due to GERD      Plan:     No clear evidence for pulmonary cause of her current symptoms  Symptoms are entirely consistent with positional vertigo, probably postviral  ENT evaluation is completely appropriate  Cough is most likely due to reflux, we will start Protonix 40 mg daily  Does not have excessive daytime sleepiness or symptoms consistent with sleep apnea, sleep study not necessarily indicated at this time  Reassured patient of the above  Continue to follow up with Cardiology and primary care  Can return to see me p.r.n.

## 2023-07-26 ENCOUNTER — TELEPHONE (OUTPATIENT)
Dept: SLEEP MEDICINE | Facility: CLINIC | Age: 48
End: 2023-07-26
Payer: COMMERCIAL

## 2023-07-26 DIAGNOSIS — I27.20 PULMONARY HYPERTENSION: ICD-10-CM

## 2023-07-26 DIAGNOSIS — G47.30 SLEEP APNEA, UNSPECIFIED TYPE: Primary | ICD-10-CM

## 2023-07-26 NOTE — TELEPHONE ENCOUNTER
----- Message from Amrit Roberts sent at 7/26/2023  1:28 PM CDT -----  [facility]

## 2023-07-27 ENCOUNTER — CLINICAL SUPPORT (OUTPATIENT)
Dept: AUDIOLOGY | Facility: CLINIC | Age: 48
End: 2023-07-27
Payer: COMMERCIAL

## 2023-07-27 ENCOUNTER — OFFICE VISIT (OUTPATIENT)
Dept: OTOLARYNGOLOGY | Facility: CLINIC | Age: 48
End: 2023-07-27
Payer: COMMERCIAL

## 2023-07-27 VITALS — TEMPERATURE: 97 F

## 2023-07-27 DIAGNOSIS — R42 VERTIGO: ICD-10-CM

## 2023-07-27 DIAGNOSIS — R42 DIZZINESS: Primary | ICD-10-CM

## 2023-07-27 DIAGNOSIS — H81.8X9 PERSISTENT POSTURAL-PERCEPTUAL DIZZINESS: Primary | ICD-10-CM

## 2023-07-27 PROCEDURE — 99203 PR OFFICE/OUTPT VISIT, NEW, LEVL III, 30-44 MIN: ICD-10-PCS | Mod: S$GLB,,, | Performed by: STUDENT IN AN ORGANIZED HEALTH CARE EDUCATION/TRAINING PROGRAM

## 2023-07-27 PROCEDURE — 1159F MED LIST DOCD IN RCRD: CPT | Mod: CPTII,S$GLB,, | Performed by: STUDENT IN AN ORGANIZED HEALTH CARE EDUCATION/TRAINING PROGRAM

## 2023-07-27 PROCEDURE — 99999 PR PBB SHADOW E&M-EST. PATIENT-LVL III: CPT | Mod: PBBFAC,,, | Performed by: STUDENT IN AN ORGANIZED HEALTH CARE EDUCATION/TRAINING PROGRAM

## 2023-07-27 PROCEDURE — 99999 PR PBB SHADOW E&M-EST. PATIENT-LVL III: ICD-10-PCS | Mod: PBBFAC,,, | Performed by: STUDENT IN AN ORGANIZED HEALTH CARE EDUCATION/TRAINING PROGRAM

## 2023-07-27 PROCEDURE — 99203 OFFICE O/P NEW LOW 30 MIN: CPT | Mod: S$GLB,,, | Performed by: STUDENT IN AN ORGANIZED HEALTH CARE EDUCATION/TRAINING PROGRAM

## 2023-07-27 PROCEDURE — 1159F PR MEDICATION LIST DOCUMENTED IN MEDICAL RECORD: ICD-10-PCS | Mod: CPTII,S$GLB,, | Performed by: STUDENT IN AN ORGANIZED HEALTH CARE EDUCATION/TRAINING PROGRAM

## 2023-07-27 NOTE — PROGRESS NOTES
"Mer Priest was seen 07/27/2023 for BPPV testing. Patient reported episodes of dizziness since the end of February. Patient reported brief "wooziness" and feeling like she was going to pass out. She originally though it was due to changes in elevation (I.e., bridge, elevator). She then experienced episodes while driving. She also noted at work she she leans forwards it feels like she is going to fall and she notices a general imbalance while walling. Patient noted occasional room-spinning dizziness when laying on her right side.     Right [place]-Hallpike: Negative for BPPV  Left [place]-Hallpike: Negative for BPPV    Patient was counseled on the above findings.     Recommendations:  ENT Review.       "

## 2023-07-27 NOTE — PROGRESS NOTES
Chief complaint:   Chief Complaint   Patient presents with    Dizziness     Reports dizziness on/off since Feb.  Has increased recently and is occurring while        Referring Provider:  Des King Md  [address] [facility]  [place] [id number]    History of Present Illness:     Ms. Priest is a 48 y.o. female presenting for evaluation of dizziness.     Onset: first started on Febraury 28, 2023. [place] [facility] [place] felt she as getting woozy. Resolved when she got off the bridge, the symptoms resolved. Happened again when she was getting off the elevator.     Episodes were most days previously. Almost always when she is driving - sensation of woozy, lightheaded. Associated with anxiety and worsened since losing her son.      Since then has been much less frequent. Better since starting metoprolol.     No episodes of room spinning , although this did happen a few times at first.      Worked up by cardiology. Recently had monitor placed.     Associated signs and symptoms:    [] Hearing loss  [] Ear fullness  [] Tinnitus  [] Headache  [] Light sensitivity  [] Sound sensitivity  [] Nausea   [] Vomiting  [] Dizziness with valsalva or weather change  [] Autophony    The patient denies significant hearing loss risk factors, ototoxic or vestibulotoxic medication exposure, chronic vestibular suppressant use, head/ facial/ davian trauma, and otologic surgery.          History        Past Medical History:   Past Medical History:   Diagnosis Date    Benign paroxysmal positional vertigo 7/25/2023    GERD without esophagitis 7/25/2023    Heart disease     Hypertension     Syphilis 11/2020    .          Past Surgical History:  Past Surgical History:   Procedure Laterality Date    BILATERAL TUBAL LIGATION      TETRALOGY OF FALLOT REPAIR  1977   .         Medications: Medication list was reviewed. She  has a current medication list which includes the following prescription(s): amlodipine,  dexchlorphen-phenylephrine-dm, gabapentin, hydrochlorothiazide, levocetirizine, metoprolol succinate, pantoprazole, aspirin, and meclizine.         Allergies:   Review of patient's allergies indicates:   Allergen Reactions    Ace inhibitors Swelling    Lisinopril Other (See Comments)            Family history: family history includes Ovarian cancer in her cousin; Prostate cancer in her father.         Social History          Alcohol use:  has no history on file for alcohol use.            Tobacco:  reports that she quit smoking about 14 months ago. Her smoking use included cigarettes. She smoked an average of .5 packs per day. She has never used smokeless tobacco.         Please see the patient's intake form for full details of past medical history, past surgical history, family history, social history and review of systems. ?This information was reviewed by me and noted.      Physical Examination     Vitals:    07/27/23 1136   Temp: 97.4 °F (36.3 °C)        General: Well developed, well nourished, well hydrated. Verbal with a strong voice and not dysphonic.     Head/Face: Normocephalic, atraumatic. No scars or lesions. Facial musculature equal.     Eyes: No scleral icterus or conjunctival hemorrhage. EOMI. PERRLA.     Ears:     Right ear: No gross deformity. EAC is clear of debris and erythema. The TM is intact with a pneumatized middle ear. No signs of retraction, fluid or infection.      Left ear: No gross deformity. EAC is clear of debris and erythema. The TM is intact with a pneumatized middle ear. No signs of retraction, fluid or infection.     Neurologic: Moving all extremities without gross abnormality.CN II-XII grossly intact. House-Brackmann 1/6.     Motor strength:  Strength 5/5 throughout.    Sensation:  Sensory function to light touch and proprioception intact throughout.    Gait:  No ataxia and can tandem gait 6 steps.    Romberg test:  No abnormal sway or falls with eyes open and closed.      Finger-to-nose testing intact without dysmetria.        Data review    Audiology assessment  Right [place]-Hallpike: Negative for BPPV  Left [place]-Hallpike: Negative for BPPV       Assessment     1. Persistent postural-perceptual dizziness    2. Vertigo          Plan:      Persistent postural perceptual dizziness (3PD)    Classic symptoms inclue dizziness that is poorly described, chronic imbalance, and patients may have trouble in settings with lots of visual movements (large groups). Additionally, there is almost always a preceeding event that startd the dizziness such as a head injury or trauma, new heart disease, medications, illness, or worsening anxiety/depressoin.     Has noticed improved since starting metoprolol. Has had less anxiety when driving and less associated dizziness.    We discussed continuing metoprool and re-evaluating symptoms in 1 month. If insufficient improvement then may add SNRI or SSR. We also discussed that measures to improve her anxiety, such as CBT might be helpful as well.          Skyler Zavala MD  [facility]   [facility]  [id number] [place] [address].  [place] [id number]  P: [phone]  F: [phone]

## 2023-08-09 ENCOUNTER — TELEPHONE (OUTPATIENT)
Dept: PULMONOLOGY | Facility: CLINIC | Age: 48
End: 2023-08-09
Payer: COMMERCIAL

## 2023-08-09 DIAGNOSIS — R06.09 DOE (DYSPNEA ON EXERTION): Primary | ICD-10-CM

## 2023-08-10 ENCOUNTER — OFFICE VISIT (OUTPATIENT)
Dept: SLEEP MEDICINE | Facility: CLINIC | Age: 48
End: 2023-08-10
Payer: COMMERCIAL

## 2023-08-10 ENCOUNTER — HOSPITAL ENCOUNTER (OUTPATIENT)
Dept: RADIOLOGY | Facility: HOSPITAL | Age: 48
Discharge: HOME | End: 2023-08-10
Attending: INTERNAL MEDICINE
Payer: COMMERCIAL

## 2023-08-10 VITALS
HEART RATE: 67 BPM | BODY MASS INDEX: 33.68 KG/M2 | RESPIRATION RATE: 17 BRPM | OXYGEN SATURATION: 95 % | DIASTOLIC BLOOD PRESSURE: 80 MMHG | WEIGHT: 222.25 LBS | HEIGHT: 68 IN | SYSTOLIC BLOOD PRESSURE: 124 MMHG

## 2023-08-10 DIAGNOSIS — E66.09 CLASS 1 OBESITY DUE TO EXCESS CALORIES WITHOUT SERIOUS COMORBIDITY WITH BODY MASS INDEX (BMI) OF 31.0 TO 31.9 IN ADULT: ICD-10-CM

## 2023-08-10 DIAGNOSIS — R06.09 DOE (DYSPNEA ON EXERTION): ICD-10-CM

## 2023-08-10 DIAGNOSIS — G47.30 SLEEP APNEA, UNSPECIFIED TYPE: ICD-10-CM

## 2023-08-10 DIAGNOSIS — I10 PRIMARY HYPERTENSION: ICD-10-CM

## 2023-08-10 DIAGNOSIS — I27.20 PULMONARY HYPERTENSION: ICD-10-CM

## 2023-08-10 DIAGNOSIS — G47.33 OSA (OBSTRUCTIVE SLEEP APNEA): Primary | ICD-10-CM

## 2023-08-10 LAB
OHS CV HOLTER SINUS AVERAGE HR: 67
OHS CV HOLTER SINUS MAX HR: 88
OHS CV HOLTER SINUS MIN HR: 54

## 2023-08-10 PROCEDURE — 71046 X-RAY EXAM CHEST 2 VIEWS: CPT | Mod: TC

## 2023-08-10 PROCEDURE — 1159F PR MEDICATION LIST DOCUMENTED IN MEDICAL RECORD: ICD-10-PCS | Mod: CPTII,S$GLB,, | Performed by: INTERNAL MEDICINE

## 2023-08-10 PROCEDURE — 71046 X-RAY EXAM CHEST 2 VIEWS: CPT | Mod: 26,,, | Performed by: RADIOLOGY

## 2023-08-10 PROCEDURE — 1160F PR REVIEW ALL MEDS BY PRESCRIBER/CLIN PHARMACIST DOCUMENTED: ICD-10-PCS | Mod: CPTII,S$GLB,, | Performed by: INTERNAL MEDICINE

## 2023-08-10 PROCEDURE — 3074F SYST BP LT 130 MM HG: CPT | Mod: CPTII,S$GLB,, | Performed by: INTERNAL MEDICINE

## 2023-08-10 PROCEDURE — 71046 XR CHEST PA AND LATERAL: ICD-10-PCS | Mod: 26,,, | Performed by: RADIOLOGY

## 2023-08-10 PROCEDURE — 99214 OFFICE O/P EST MOD 30 MIN: CPT | Mod: S$GLB,,, | Performed by: INTERNAL MEDICINE

## 2023-08-10 PROCEDURE — 3008F PR BODY MASS INDEX (BMI) DOCUMENTED: ICD-10-PCS | Mod: CPTII,S$GLB,, | Performed by: INTERNAL MEDICINE

## 2023-08-10 PROCEDURE — 99999 PR PBB SHADOW E&M-EST. PATIENT-LVL V: CPT | Mod: PBBFAC,,, | Performed by: INTERNAL MEDICINE

## 2023-08-10 PROCEDURE — 3074F PR MOST RECENT SYSTOLIC BLOOD PRESSURE < 130 MM HG: ICD-10-PCS | Mod: CPTII,S$GLB,, | Performed by: INTERNAL MEDICINE

## 2023-08-10 PROCEDURE — 99214 PR OFFICE/OUTPT VISIT, EST, LEVL IV, 30-39 MIN: ICD-10-PCS | Mod: S$GLB,,, | Performed by: INTERNAL MEDICINE

## 2023-08-10 PROCEDURE — 1159F MED LIST DOCD IN RCRD: CPT | Mod: CPTII,S$GLB,, | Performed by: INTERNAL MEDICINE

## 2023-08-10 PROCEDURE — 99999 PR PBB SHADOW E&M-EST. PATIENT-LVL V: ICD-10-PCS | Mod: PBBFAC,,, | Performed by: INTERNAL MEDICINE

## 2023-08-10 PROCEDURE — 3079F DIAST BP 80-89 MM HG: CPT | Mod: CPTII,S$GLB,, | Performed by: INTERNAL MEDICINE

## 2023-08-10 PROCEDURE — 1160F RVW MEDS BY RX/DR IN RCRD: CPT | Mod: CPTII,S$GLB,, | Performed by: INTERNAL MEDICINE

## 2023-08-10 PROCEDURE — 3008F BODY MASS INDEX DOCD: CPT | Mod: CPTII,S$GLB,, | Performed by: INTERNAL MEDICINE

## 2023-08-10 PROCEDURE — 3079F PR MOST RECENT DIASTOLIC BLOOD PRESSURE 80-89 MM HG: ICD-10-PCS | Mod: CPTII,S$GLB,, | Performed by: INTERNAL MEDICINE

## 2023-08-10 RX ORDER — FLUTICASONE PROPIONATE 50 MCG
SPRAY, SUSPENSION (ML) NASAL
COMMUNITY
Start: 2022-11-29 | End: 2023-12-14

## 2023-08-10 RX ORDER — BENZONATATE 200 MG/1
CAPSULE ORAL
COMMUNITY
Start: 2023-05-08

## 2023-08-10 RX ORDER — CETIRIZINE HYDROCHLORIDE 10 MG/1
10 TABLET ORAL DAILY PRN
COMMUNITY
Start: 2023-05-08 | End: 2023-12-18 | Stop reason: SDUPTHER

## 2023-08-10 NOTE — PATIENT INSTRUCTIONS
Your provider has scheduled you for a sleep study.   You should be receiving a phone call from the sleep lab shortly after your study has been approved by your insurance. Please make sure you have your current phone numbers in the [facility]. If you do not hear from anyone in the next 10 business days, please call the sleep lab at [phone] to schedule your sleep study. The sleep studies are performed at [facility] seven nights a week.  When you are scheduling your sleep study, they will also make you a follow up appointment with your provider. This follow up appointment will be 10-14 days after your sleep study to review the results. If it is noted that you do have sleep apnea on your initial sleep study, you may receive a call back for a second night study with the CPAP before you come back to the office.

## 2023-08-10 NOTE — PROGRESS NOTES
Pulmonary Outpatient  Visit     Subjective:       Patient ID: Mer Priest is a 48 y.o. female.    Social History     Tobacco Use   Smoking Status Former    Current packs/day: 0.00    Types: Cigarettes    Quit date: 2022    Years since quittin.2   Smokeless Tobacco Never            Chief Complaint: Sleep Apnea      Mer Priest is 48 y.o.  Referred by Dr Christine Solorio show [place] estimated 43mmHG  Hx of TOF SP repair  Request for sleep evaluation  Seen Dr José Miguel Pack 2023: for pulmonary consult  Refer to note  Lots of domestic stressors  Cannot sleep at night  Bed time 10:30 pm, Varied sleep latency  Wake time 6 am  Legs feels odd and restless, itchy  Sleep attacks  No cataplexy or collapse  Occasional sleep paralysis  No violent movement in sleep  Physical discomfort often  Taking melatonin help with sleep.  Endorses: snoring, nocturnal diaphoresis, gasping for air at night, difficulty falling asleep                     Review of Systems   Constitutional:  Positive for fatigue.   Respiratory:  Positive for apnea, snoring and somnolence.    Psychiatric/Behavioral:  Positive for sleep disturbance.        Outpatient Encounter Medications as of 8/10/2023   Medication Sig Dispense Refill    amLODIPine (NORVASC) 5 MG tablet Take 1 tablet by mouth every day 90 tablet 3    aspirin (ECOTRIN) 81 MG EC tablet Take 1 tablet by mouth once daily.      benzonatate (TESSALON) 200 MG capsule TAKE 1 CAPSULE (ORAL) 3 TIMES PER DAY AS NEEDED FOR COUGH DO NOT CUT, BITE, CRUSH OR CHEW.      cetirizine (ZYRTEC) 10 MG tablet Take 10 mg by mouth every evening.      dexchlorphen-phenylephrine-DM 1-5-10 mg/5 mL Syrp Take 10 mLs by mouth every 6 to 8 hours as needed (cough/congestion). 140 mL 0    fluticasone propionate (FLONASE ALLERGY RELIEF) 50 mcg/actuation nasal spray Flonase Allergy Relief Take 1 Puff(s) (nasal - bilaterally) 2 times per day for 7 days 2022 spray,suspension 2  times per day nasal 7 days suspended 50 mcg/actuation      hydroCHLOROthiazide (HYDRODIURIL) 25 MG tablet Take 1 tablet (25 mg total) by mouth once daily. 90 tablet 1    levocetirizine (XYZAL) 5 MG tablet Take 1 tablet (5 mg total) by mouth every evening. 30 tablet 11    meclizine (ANTIVERT) 25 mg tablet Take 1 tablet (25 mg total) by mouth 3 (three) times daily as needed for Dizziness. 30 tablet 0    metoprolol succinate (TOPROL-XL) 50 MG 24 hr tablet Take 1 tablet (50 mg total) by mouth 2 (two) times daily. 180 tablet 3    pantoprazole (PROTONIX) 40 MG tablet Take 1 tablet (40 mg total) by mouth once daily. 90 tablet 3    gabapentin (NEURONTIN) 300 MG capsule Take 1 capsule by mouth 3 (three) times daily.       No facility-administered encounter medications on file as of 8/10/2023.       The following portions of the patient's history were reviewed and updated as appropriate: She  has a past medical history of Benign paroxysmal positional vertigo (7/25/2023), GERD without esophagitis (7/25/2023), Heart disease, Hypertension, and Syphilis (11/2020).  She does not have any pertinent problems on file.  She  has a past surgical history that includes Tetralogy of Fallot repair (1977) and Bilateral tubal ligation.  Her family history includes Ovarian cancer in her cousin; Prostate cancer in her father.  She  reports that she quit smoking about 15 months ago. Her smoking use included cigarettes. She has never used smokeless tobacco. No history on file for alcohol use and drug use.  She has a current medication list which includes the following prescription(s): amlodipine, aspirin, benzonatate, cetirizine, dexchlorphen-phenylephrine-dm, fluticasone propionate, hydrochlorothiazide, levocetirizine, meclizine, metoprolol succinate, pantoprazole, and gabapentin.  Current Outpatient Medications on File Prior to Visit   Medication Sig Dispense Refill    amLODIPine (NORVASC) 5 MG tablet Take 1 tablet by mouth every day 90 tablet  3    aspirin (ECOTRIN) 81 MG EC tablet Take 1 tablet by mouth once daily.      benzonatate (TESSALON) 200 MG capsule TAKE 1 CAPSULE (ORAL) 3 TIMES PER DAY AS NEEDED FOR COUGH DO NOT CUT, BITE, CRUSH OR CHEW.      cetirizine (ZYRTEC) 10 MG tablet Take 10 mg by mouth every evening.      dexchlorphen-phenylephrine-DM 1-5-10 mg/5 mL Syrp Take 10 mLs by mouth every 6 to 8 hours as needed (cough/congestion). 140 mL 0    fluticasone propionate (FLONASE ALLERGY RELIEF) 50 mcg/actuation nasal spray Flonase Allergy Relief Take 1 Puff(s) (nasal - bilaterally) 2 times per day for 7 days [id number] spray,suspension 2 times per day nasal 7 days suspended 50 mcg/actuation      hydroCHLOROthiazide (HYDRODIURIL) 25 MG tablet Take 1 tablet (25 mg total) by mouth once daily. 90 tablet 1    levocetirizine (XYZAL) 5 MG tablet Take 1 tablet (5 mg total) by mouth every evening. 30 tablet 11    meclizine (ANTIVERT) 25 mg tablet Take 1 tablet (25 mg total) by mouth 3 (three) times daily as needed for Dizziness. 30 tablet 0    metoprolol succinate (TOPROL-XL) 50 MG 24 hr tablet Take 1 tablet (50 mg total) by mouth 2 (two) times daily. 180 tablet 3    pantoprazole (PROTONIX) 40 MG tablet Take 1 tablet (40 mg total) by mouth once daily. 90 tablet 3    gabapentin (NEURONTIN) 300 MG capsule Take 1 capsule by mouth 3 (three) times daily.       No current facility-administered medications on file prior to visit.     She is allergic to ace inhibitors and lisinopril..      BP Readings from Last 3 Encounters:   08/10/23 124/80   07/25/23 (!) 138/92   07/21/23 (!) 145/90     Snoring / Sleep:     [place] Questionnaire (validated DARLING screening questionnaire)    YES -- Snoring/apnea    YES -- Fatigue    Body mass index is 33.79 kg/m².  (>25 is overweight, >30 is obese)    Blood Pressure = Hypertension  (PreHTN 120-139/80-89, Stg1 140-159/90-99, Stg2 >160/>100)  [place] = 2 of three DARLING categories are positive (high risk is 2-3 positive categories)  "    [place] Sleepiness Scale TOTAL =    EPWORTH SLEEPINESS SCALE 8/10/2023   Sitting and reading 2   Watching TV 2   Sitting, inactive in a public place (e.g. a theatre or a meeting) 0   As a passenger in a car for an hour without a break 0   Lying down to rest in the afternoon when circumstances permit 2   Sitting and talking to someone 0   Sitting quietly after a lunch without alcohol 3   In a car, while stopped for a few minutes in traffic 0   Total score 9       (validated sleepiness questionnaire with a higher score indicating greater sleepiness; range 0-24)  No flowsheet data found.    STOP-Bang Questionnaire (validated DARLING screening questionnaire)  Negative unless checked off.  [x] Snoring    [x]  Tired/Fatigued/Sleepy  [] Obstruction (apneas/choking)  [] Pressure (HTN)  [] BMI >35  [] Age >50  [] Neck >40 cm  [] Gender male   STOP-Bang = 2 (low risk 0-2,high risk 3-8)    Neck circumference 17"         MMRC Dyspnea Scale (4 is worst)     [x] MMRC 0: Dyspneic on strenuous excercise (0 points)    [] MMRC 1: Dyspneic on walking a slight hill (0 points)    [] MMRC 2: Dyspneic on walking level ground; must stop occasionally due to breathlessness (1 point)    [] MMRC 3: Must stop for breathlessness after walking 100 yards or after a few minutes (2 points)    [] MMRC 4: Cannot leave house; breathless on dressing/undressing (3 points)                     No flowsheet data found.              Objective:     Vital Signs (Most Recent)  Vital Signs  Pulse: 67  Resp: 17  SpO2: 95 %  BP: 124/80  Height and Weight  Height: 5' 8" (172.7 cm)  Weight: 100.8 kg (222 lb 3.6 oz)  BSA (Calculated - sq m): 2.2 sq meters  BMI (Calculated): 33.8  Weight in (lb) to have BMI = 25: 164.1]  Wt Readings from Last 2 Encounters:   08/10/23 100.8 kg (222 lb 3.6 oz)   07/25/23 101.7 kg (224 lb 3.3 oz)       Physical Exam  Vitals and nursing note reviewed.   Constitutional:       Appearance: She is normal weight.   HENT:      Head: " "Normocephalic and atraumatic.      Nose: Nose normal.   Eyes:      Pupils: Pupils are equal, round, and reactive to light.   Cardiovascular:      Rate and Rhythm: Normal rate and regular rhythm.      Pulses: Normal pulses.      Heart sounds: Normal heart sounds.   Pulmonary:      Effort: Pulmonary effort is normal.      Breath sounds: Normal breath sounds.   Abdominal:      General: Bowel sounds are normal.      Palpations: Abdomen is soft.   Musculoskeletal:      Cervical back: Normal range of motion.   Skin:     General: Skin is warm.   Neurological:      General: No focal deficit present.      Mental Status: She is alert and oriented to person, place, and time.   Psychiatric:         Mood and Affect: Mood normal.          Laboratory  Lab Results   Component Value Date    WBC 10.08 03/13/2023    RBC 4.64 03/13/2023    HGB 12.9 03/13/2023    HCT 41.2 03/13/2023    MCV 89 03/13/2023    MCH 27.8 03/13/2023    MCHC 31.3 (L) 03/13/2023    RDW 19.4 (H) 03/13/2023     03/13/2023    MPV 11.5 03/13/2023    GRAN 7.1 03/13/2023    GRAN 70.3 03/13/2023    LYMPH 2.0 03/13/2023    LYMPH 19.6 03/13/2023    MONO 0.8 03/13/2023    MONO 7.5 03/13/2023    EOS 0.2 03/13/2023    BASO 0.05 03/13/2023    EOSINOPHIL 1.8 03/13/2023    BASOPHIL 0.5 03/13/2023       BMP  Lab Results   Component Value Date     07/21/2023    K 3.9 07/21/2023     07/21/2023    CO2 24 07/21/2023    BUN 13 07/21/2023    CREATININE 0.8 07/21/2023    CALCIUM 9.8 07/21/2023    ANIONGAP 11 07/21/2023    ESTGFRAFRICA 70 05/16/2022    AST 13 07/21/2023    ALT 13 07/21/2023    PROT 8.2 07/21/2023          No results found for: "IGE"     No results found for: "ASPERGILLUS"  No results found for: "AFUMIGATUSCL"     No results found for: "ACE"     Diagnostic Results:  I have personally reviewed today the following studies:    X-Ray Chest PA And Lateral  Narrative: EXAM:  XR CHEST PA AND LATERAL    CLINICAL HISTORY:   Dyspnea.    2 views of the " chest.    FINDINGS: Cardiac silhouette is enlarged.  Central vascular congestion.  No consolidation, effusion or pneumothorax.  Osseous structures intact.  Lungs are clear.  Impression:   Cardiomegaly with central vascular congestion.    Finalized on: 8/10/2023 1:10 PM By:  Bipin Skinner MD  [facility]# [id number]      2023-08-10 13:12:[id number]    [facility]       Assessment/Plan:     Problem List Items Addressed This Visit       Class 1 obesity due to excess calories without serious comorbidity with body mass index (BMI) of 31.0 to 31.9 in adult    Hypertension     On NORVASC         DARLING (obstructive sleep apnea) - Primary     Sleep study ordered         Relevant Orders    Polysomnogram (CPAP will be added if patient meets diagnostic criteria.)    Pulmonary hypertension     PASP 43  Hx Congenital Heart disease SP surgery            Other Visit Diagnoses       Sleep apnea, unspecified type               CXR suggest volume overload  DW cardiology about diuretics       Follow up in about 4 weeks (around 9/7/2023), or weight loss, PSG.    This note was prepared using voice recognition system and is likely to have sound alike errors that may have been overlooked even after proof reading.  Please call me with any questions    Discussed diagnosis, its evaluation, treatment and usual course. All questions answered.    Thank you for the courtesy of participating in the care of this patient    Jakob Garner MD      Personal Diagnostic Review  []  CXR    []  ECHO    []  ONSAT    []  6MWD    []  LABS    []  CHEST CT    []  PET CT    []  Biopsy results              Problems Address                                                 Amount and/or Complexity                                                                      Risk       3           [] 2 or more self-limited or minor problems                      [] Limited                                                                        [] Low                  [] 1 stable chronic illness                                                   Any combination of the two                                               OTC drugs                  []Acute, uncomplicated illness or injury                            Review of prior external notes from unique source           Minor surgery with no risk factors                                                                                                               [] 1 []2  []3+                                                                                                              Review of results from each unique test                                                                                                               [] 1 []2  [] 3+                                                                                                              Order of each unique test                                                                                                               [] 1 []2  [] 3+                                                                                                              Or                                                                                                             [] Assessment requiring an independent historian      4            [] One or more chronic illness with exacerbation,              [] Moderate                                                                      [] Moderate                 Progression, or side effects of treatment                            -test documents or independent historians                        Prescription drug management                []  2 or more sable chronic illnesses                                    [] Independent interpretation of tests                              Minor surgery with identifiable risk                [] 1 undiagnosed new problem with uncertain prognosis    [] Discussion or management of test results                    elective major surgery                 [] 1 acute illness with                systemic symptoms                                                                                                                                                              [] 1 acute complicated injury                                                                                                                                          Elective major surgery                                                                                                                                                                                                                                                                                                                                                                                                  5            [] 1 or more chronic illnesses with severe exacerbation,     [] Extensive(two from below)                                         [] High                                                                                                               [] Independent interpretation of results                         Drug therapy requiring intensive                                                                                                               []Discussion of management or test interpretation           monitoring                                                                                                                                                                                                       Decision to de-escalate care                 [] 1 acute or chronic illness or injury that poses a threat                                                                                               Decision regarding hospitalization

## 2023-08-11 ENCOUNTER — TELEPHONE (OUTPATIENT)
Dept: CARDIOLOGY | Facility: CLINIC | Age: 48
End: 2023-08-11
Payer: COMMERCIAL

## 2023-08-11 NOTE — TELEPHONE ENCOUNTER
Called patient to discuss monitor results. No questions or concerns. KA    ----- Message from Juan David Xavier MD sent at 8/10/2023  7:55 PM CDT -----  No events would follow up to next appt  ----- Message -----  From: Juan David Xavier MD  Sent: 8/10/2023   3:24 PM CDT  To: Des King MD

## 2023-08-29 ENCOUNTER — E-CONSULT (OUTPATIENT)
Dept: CARDIOLOGY | Facility: CLINIC | Age: 48
End: 2023-08-29
Payer: COMMERCIAL

## 2023-08-29 ENCOUNTER — HOSPITAL ENCOUNTER (OUTPATIENT)
Dept: PREADMISSION TESTING | Facility: HOSPITAL | Age: 48
Discharge: HOME | End: 2023-08-29
Attending: INTERNAL MEDICINE
Payer: COMMERCIAL

## 2023-08-29 DIAGNOSIS — Z12.31 BREAST CANCER SCREENING BY MAMMOGRAM: Primary | ICD-10-CM

## 2023-08-29 DIAGNOSIS — D50.0 IRON DEFICIENCY ANEMIA DUE TO CHRONIC BLOOD LOSS: ICD-10-CM

## 2023-08-29 DIAGNOSIS — Z01.810 PREOP CARDIOVASCULAR EXAM: ICD-10-CM

## 2023-08-29 DIAGNOSIS — Z12.11 SPECIAL SCREENING FOR MALIGNANT NEOPLASM OF COLON: ICD-10-CM

## 2023-08-29 PROCEDURE — 99451 NTRPROF PH1/NTRNET/EHR 5/>: CPT | Mod: S$GLB,,, | Performed by: INTERNAL MEDICINE

## 2023-08-29 PROCEDURE — 99451 PR INTERPROF, PHONE/INTERNET/EHR, CONSULT, >= 5MINS: ICD-10-PCS | Mod: S$GLB,,, | Performed by: INTERNAL MEDICINE

## 2023-08-29 RX ORDER — SODIUM, POTASSIUM,MAG SULFATES 17.5-3.13G
1 SOLUTION, RECONSTITUTED, ORAL ORAL DAILY
Qty: 1 KIT | Refills: 0 | Status: SHIPPED | OUTPATIENT
Start: 2023-08-29 | End: 2023-09-10

## 2023-08-29 NOTE — CONSULTS
[facility]  Response for E-Consult     Patient Name: Mer Priest  MRN: [id number]  Primary Care Provider: No, Primary Doctor   Requesting Provider: Claudia Cerna NP  E-Consult to Cardiology  Consult performed by: Cuco Teague MD  Consult ordered by: Claudia Cerna NP          47 yo female, E consult for preop clearance of colonoscopy and EGD  The chart reviewed.  PMH HTN, RBBB, pulm HTN, congenital heart disease s/p repair of Tetralogy of Fallot at age 1, obesity, Fe def anemia   Echo done 01/23: EF 55% pulm HTN LA dilation.     Plan  Elevated periop risk of CV events for non-high risk procedure.  Ok to proceed the scheduled procedure without further cardiac study.  OK to hold ASA 5 days before the procedure and resume postop once hemodynamically stable      Total time of Consultation: 10 minute    I did not speak to the requesting provider verbally about this.     *This eConsult is based on the clinical data available to me and is furnished without benefit of a physical examination. The eConsult will need to be interpreted in light of any clinical issues or changes in patient status not available to me at the time of filing this eConsults. Significant changes in patient condition or level of acuity should result in immediate formal consultation and reevaluation. Please alert me if you have further questions.    Thank you for this eConsult referral.     Cuco Teague MD  [facility]

## 2023-09-06 ENCOUNTER — OFFICE VISIT (OUTPATIENT)
Dept: URGENT CARE | Facility: CLINIC | Age: 48
End: 2023-09-06
Payer: COMMERCIAL

## 2023-09-06 VITALS
DIASTOLIC BLOOD PRESSURE: 65 MMHG | RESPIRATION RATE: 18 BRPM | HEART RATE: 80 BPM | SYSTOLIC BLOOD PRESSURE: 137 MMHG | BODY MASS INDEX: 33.34 KG/M2 | TEMPERATURE: 98 F | OXYGEN SATURATION: 96 % | WEIGHT: 220 LBS | HEIGHT: 68 IN

## 2023-09-06 DIAGNOSIS — R05.9 COUGH, UNSPECIFIED TYPE: ICD-10-CM

## 2023-09-06 DIAGNOSIS — R52 BODY ACHES: Primary | ICD-10-CM

## 2023-09-06 DIAGNOSIS — R09.81 NASAL CONGESTION: ICD-10-CM

## 2023-09-06 PROCEDURE — 87502 INFLUENZA DNA AMP PROBE: CPT | Mod: QW,S$GLB,, | Performed by: PHYSICIAN ASSISTANT

## 2023-09-06 PROCEDURE — 99213 OFFICE O/P EST LOW 20 MIN: CPT | Mod: S$GLB,,, | Performed by: PHYSICIAN ASSISTANT

## 2023-09-06 PROCEDURE — 87502 POCT INFLUENZA A/B MOLECULAR: ICD-10-PCS | Mod: QW,S$GLB,, | Performed by: PHYSICIAN ASSISTANT

## 2023-09-06 PROCEDURE — 99213 PR OFFICE/OUTPT VISIT, EST, LEVL III, 20-29 MIN: ICD-10-PCS | Mod: S$GLB,,, | Performed by: PHYSICIAN ASSISTANT

## 2023-09-06 PROCEDURE — 87811 SARS CORONAVIRUS 2 ANTIGEN POCT, MANUAL READ: ICD-10-PCS | Mod: QW,S$GLB,, | Performed by: PHYSICIAN ASSISTANT

## 2023-09-06 PROCEDURE — 87811 SARS-COV-2 COVID19 W/OPTIC: CPT | Mod: QW,S$GLB,, | Performed by: PHYSICIAN ASSISTANT

## 2023-09-06 RX ORDER — DESLORATADINE 5 MG/1
5 TABLET ORAL DAILY
Qty: 7 TABLET | Refills: 0 | Status: SHIPPED | OUTPATIENT
Start: 2023-09-06 | End: 2023-12-14

## 2023-09-06 RX ORDER — METHYLPREDNISOLONE 4 MG/1
TABLET ORAL
Qty: 21 EACH | Refills: 0 | Status: SHIPPED | OUTPATIENT
Start: 2023-09-06 | End: 2023-09-27

## 2023-09-06 RX ORDER — FLUTICASONE PROPIONATE 50 MCG
1 SPRAY, SUSPENSION (ML) NASAL DAILY
Qty: 15.8 ML | Refills: 0 | Status: SHIPPED | OUTPATIENT
Start: 2023-09-06 | End: 2023-12-14

## 2023-09-06 NOTE — LETTER
September 6, 2023      [facility]  [address], [address]  [place] [address]  Phone: [phone]       Patient: Mer Priest   YOB: 1975  Date of Visit: 09/06/2023    To Whom It May Concern:    Shana Priest  was at [facility] on 09/06/2023. The patient may return to work/school on 9/8/23 with no restrictions. If you have any questions or concerns, or if I can be of further assistance, please do not hesitate to contact me.    Sincerely,      Annemarie Olvera PA-C

## 2023-09-06 NOTE — PROGRESS NOTES
"Subjective:      Patient ID: Mer Priest is a 48 y.o. female.    Vitals:  height is 5' 8" (1.727 m) and weight is 99.8 kg (220 lb). Her tympanic temperature is 98.2 °F (36.8 °C). Her blood pressure is 137/65 and her pulse is 80. Her respiration is 18 and oxygen saturation is 96%.     Chief Complaint: Sinus Problem    Patient presents with cough, body aches, congestion, post nasal drip, headache, and fatigue that began last night. She has not taken any OTC medicine. A coworker's son is flu positive and her son was COVID positive a week ago. She works in healthcare.    Sinus Problem  This is a new problem. The current episode started yesterday. There has been no fever. Her pain is at a severity of 10/10. Associated symptoms include congestion, coughing and headaches. Pertinent negatives include no chills, diaphoresis, ear pain, hoarse voice, neck pain, shortness of breath, sinus pressure, sneezing, sore throat or swollen glands. Past treatments include nothing.       Constitution: Negative for chills and sweating.   HENT:  Positive for congestion. Negative for ear pain, sinus pressure and sore throat.    Neck: Negative for neck pain.   Respiratory:  Positive for cough. Negative for shortness of breath.    Allergic/Immunologic: Negative for sneezing.   Neurological:  Positive for headaches.      Objective:     Physical Exam   Constitutional: She is oriented to person, place, and time. She appears well-developed. She is cooperative.   HENT:   Head: Normocephalic and atraumatic.   Ears:   Right Ear: Hearing and external ear normal.   Left Ear: Hearing and external ear normal.   Nose: Nose normal.   Mouth/Throat: Uvula is midline, oropharynx is clear and moist and mucous membranes are normal.   Eyes: Conjunctivae and lids are normal.   Neck: Neck supple.   Cardiovascular: Normal rate, regular rhythm, normal heart sounds and normal pulses.   Pulmonary/Chest: Effort normal and breath sounds normal.   Abdominal: Normal " appearance.   Musculoskeletal: Normal range of motion.         General: Normal range of motion.   Neurological: She is alert and oriented to person, place, and time. She exhibits normal muscle tone.   Skin: Skin is warm, dry and intact.   Psychiatric: Her speech is normal and behavior is normal. Judgment and thought content normal.   Nursing note and vitals reviewed.      Assessment:     1. Body aches    2. Cough, unspecified type    3. Nasal congestion      Results for orders placed or performed in visit on 09/06/23   SARS Coronavirus 2 Antigen, POCT Manual Read   Result Value Ref Range    SARS Coronavirus 2 Antigen Negative Negative     Acceptable Yes    POCT Influenza A/B MOLECULAR   Result Value Ref Range    POC Molecular Influenza A Ag Negative Negative, Not Reported    POC Molecular Influenza B Ag Negative Negative, Not Reported     Acceptable Yes        Plan:   VSS. Patient non-toxic appearing. Discussed medication being prescribed.  Advised patient to follow up with PCP as needed.  Patient verbalized understanding, agrees with the plan, and is comfortable with discharge.      Body aches  -     methylPREDNISolone (MEDROL DOSEPACK) 4 mg tablet; use as directed  Dispense: 21 each; Refill: 0    Cough, unspecified type  -     SARS Coronavirus 2 Antigen, POCT Manual Read  -     POCT Influenza A/B MOLECULAR    Nasal congestion  -     desloratadine (CLARINEX) 5 mg tablet; Take 1 tablet (5 mg total) by mouth once daily. for 7 days  Dispense: 7 tablet; Refill: 0  -     fluticasone propionate (FLONASE) 50 mcg/actuation nasal spray; 1 spray (50 mcg total) by Each Nostril route once daily.  Dispense: 15.8 mL; Refill: 0      Medical Decision Making:   Clinical Tests:   Lab Tests: Ordered and Reviewed       <> Summary of Lab: Flu negative  COVID negative

## 2023-09-20 ENCOUNTER — OFFICE VISIT (OUTPATIENT)
Dept: CARDIOLOGY | Facility: CLINIC | Age: 48
End: 2023-09-20
Payer: COMMERCIAL

## 2023-09-20 VITALS
DIASTOLIC BLOOD PRESSURE: 88 MMHG | SYSTOLIC BLOOD PRESSURE: 150 MMHG | HEIGHT: 68 IN | HEART RATE: 66 BPM | WEIGHT: 227.94 LBS | BODY MASS INDEX: 34.55 KG/M2 | OXYGEN SATURATION: 95 %

## 2023-09-20 DIAGNOSIS — I47.10 SVT (SUPRAVENTRICULAR TACHYCARDIA): ICD-10-CM

## 2023-09-20 DIAGNOSIS — E66.09 CLASS 1 OBESITY DUE TO EXCESS CALORIES WITHOUT SERIOUS COMORBIDITY WITH BODY MASS INDEX (BMI) OF 31.0 TO 31.9 IN ADULT: ICD-10-CM

## 2023-09-20 DIAGNOSIS — Z87.74 S/P REPAIR OF TETRALOGY OF FALLOT: ICD-10-CM

## 2023-09-20 DIAGNOSIS — R01.1 CARDIAC MURMUR: ICD-10-CM

## 2023-09-20 DIAGNOSIS — I49.3 PVC'S (PREMATURE VENTRICULAR CONTRACTIONS): ICD-10-CM

## 2023-09-20 DIAGNOSIS — I10 PRIMARY HYPERTENSION: ICD-10-CM

## 2023-09-20 DIAGNOSIS — I37.1 NONRHEUMATIC PULMONARY VALVE INSUFFICIENCY: ICD-10-CM

## 2023-09-20 DIAGNOSIS — I45.10 RBBB: Primary | ICD-10-CM

## 2023-09-20 DIAGNOSIS — I27.20 PULMONARY HYPERTENSION: ICD-10-CM

## 2023-09-20 DIAGNOSIS — D50.0 IRON DEFICIENCY ANEMIA DUE TO CHRONIC BLOOD LOSS: ICD-10-CM

## 2023-09-20 PROCEDURE — 3008F BODY MASS INDEX DOCD: CPT | Mod: CPTII,S$GLB,, | Performed by: INTERNAL MEDICINE

## 2023-09-20 PROCEDURE — 1159F PR MEDICATION LIST DOCUMENTED IN MEDICAL RECORD: ICD-10-PCS | Mod: CPTII,S$GLB,, | Performed by: INTERNAL MEDICINE

## 2023-09-20 PROCEDURE — 3079F PR MOST RECENT DIASTOLIC BLOOD PRESSURE 80-89 MM HG: ICD-10-PCS | Mod: CPTII,S$GLB,, | Performed by: INTERNAL MEDICINE

## 2023-09-20 PROCEDURE — 3008F PR BODY MASS INDEX (BMI) DOCUMENTED: ICD-10-PCS | Mod: CPTII,S$GLB,, | Performed by: INTERNAL MEDICINE

## 2023-09-20 PROCEDURE — 3079F DIAST BP 80-89 MM HG: CPT | Mod: CPTII,S$GLB,, | Performed by: INTERNAL MEDICINE

## 2023-09-20 PROCEDURE — 1160F PR REVIEW ALL MEDS BY PRESCRIBER/CLIN PHARMACIST DOCUMENTED: ICD-10-PCS | Mod: CPTII,S$GLB,, | Performed by: INTERNAL MEDICINE

## 2023-09-20 PROCEDURE — 99214 OFFICE O/P EST MOD 30 MIN: CPT | Mod: S$GLB,,, | Performed by: INTERNAL MEDICINE

## 2023-09-20 PROCEDURE — 99999 PR PBB SHADOW E&M-EST. PATIENT-LVL V: ICD-10-PCS | Mod: PBBFAC,,, | Performed by: INTERNAL MEDICINE

## 2023-09-20 PROCEDURE — 99214 PR OFFICE/OUTPT VISIT, EST, LEVL IV, 30-39 MIN: ICD-10-PCS | Mod: S$GLB,,, | Performed by: INTERNAL MEDICINE

## 2023-09-20 PROCEDURE — 1159F MED LIST DOCD IN RCRD: CPT | Mod: CPTII,S$GLB,, | Performed by: INTERNAL MEDICINE

## 2023-09-20 PROCEDURE — 3077F SYST BP >= 140 MM HG: CPT | Mod: CPTII,S$GLB,, | Performed by: INTERNAL MEDICINE

## 2023-09-20 PROCEDURE — 99999 PR PBB SHADOW E&M-EST. PATIENT-LVL V: CPT | Mod: PBBFAC,,, | Performed by: INTERNAL MEDICINE

## 2023-09-20 PROCEDURE — 3077F PR MOST RECENT SYSTOLIC BLOOD PRESSURE >= 140 MM HG: ICD-10-PCS | Mod: CPTII,S$GLB,, | Performed by: INTERNAL MEDICINE

## 2023-09-20 PROCEDURE — 1160F RVW MEDS BY RX/DR IN RCRD: CPT | Mod: CPTII,S$GLB,, | Performed by: INTERNAL MEDICINE

## 2023-09-20 RX ORDER — HYDROCHLOROTHIAZIDE 25 MG/1
25 TABLET ORAL DAILY
Qty: 90 TABLET | Refills: 1 | Status: SHIPPED | OUTPATIENT
Start: 2023-09-20 | End: 2023-12-20 | Stop reason: SDUPTHER

## 2023-09-20 RX ORDER — AMLODIPINE BESYLATE 5 MG/1
TABLET ORAL
Qty: 90 TABLET | Refills: 3 | Status: SHIPPED | OUTPATIENT
Start: 2023-09-20

## 2023-09-20 NOTE — PROGRESS NOTES
Subjective:   Patient ID:  Mer Priest is a 48 y.o. female who presents for cardiac consult of No chief complaint on file.      Referral by: No referring provider defined for this encounter.     Reason for consult:       HPI  The patient came in today for cardiac consult of No chief complaint on file.      Mer Priest is a 48 y.o. female pt with HTN, RBBB, pulm HTN, congenital heart disease s/p repair of Tetralogy of Fallot at age 1, obesity, Fe def anemia presents for follow up CV eval.       Zonia Belcher MD at 08/15/2022- S/P repair of tetralogy of Fallot-1976  Severe Pulmonary insufficiency  Severe right ventricular enlargement  Severe right ventricular enlargement and free PI on echocardiogram  RV EDV index 214 mL/m² cardiac MRI 7/2022  Refer for pulmonic valve replacement  - Refer to Dr. 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 ECHO Jan 2023 with normal bi V function, mild RV enlargement with pressure overload, int CVP, PASP 43 mmHg.     BP now 140s/90s. If she moves too fast she feels more presyncope, but yesterday when she sitting an eating felt like vertigo but also thinks maybe anxiety. She lost her son 5 years ago and since March she has been having more symptoms of presyncope.       9/20/23  BP elevated 150s/80s. HR 60s. BMI 34 - 227 lbs.   Vital monitor 7/2023 with frequent short tuns and 9 NSVT - max 3 beats.     She hs upcoming EGD/Cscope. Her BP higher today due to stress work/son at school.     Patient is compliant with medications. 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       FH - CVD in [facility] - congenital disease       Conclusion 7/2023         The  predominant rhythm is sinus.    Heart rates varied between 54 and 88 BPM with an average of 67 BPM.    Patient Reported Event #1 There were no patient reported events    9 runs of VT, longest 3 beats    356 runs of SVT longest 7 beats    Results for orders placed during the hospital encounter of 01/23/23    Echo    Interpretation Summary  · The left ventricle is normal in size with concentric hypertrophy and normal systolic function.  · Moderate left atrial enlargement.  · There is abnormal septal wall motion. There is systolic flattening of the interventricular septum consistent with right ventricle pressure overload.  · Intermediate central venous pressure (8 mmHg).  · The estimated PA systolic pressure is 43 mmHg.  · There is pulmonary hypertension.  · The estimated ejection fraction is 55%.  · Indeterminate left ventricular diastolic function.  · Mild right ventricular enlargement with normal right ventricular systolic function.  · Right atrial enlargement.      Cardiac MRI IMPRESSION:     Repaired tetralogy of Fallot with dilated right ventricle  mL (normal ), EDV/.15 mL/m2 (normal 57-95). Mildly decreased right ventricular function, RVEF =38%.     Mild left ventricular dilatation, mild to moderately reduced left ventricular function, LVEF =45%.     Small diverticulum of the anterior septum.     Late gadolinium enhancement at the right ventricular outflow tract likely related to patch repair.     Severely dilated right ventricular outflow tract with free pulmonic insufficiency.     This case was reviewed with Dr. Zonia Belcher of [facility].  Exam End: 06/24/22 12:50 Last Resulted: 07/06/22 09:24       No results found for this or any previous visit.      No results found for this or any previous visit.      Cardiac Monitor - 3-15 Day Adult (Cupid Only)    Result Date: 8/10/2023    The predominant rhythm is sinus.    Heart rates varied between 54 and 88 BPM with an  average of 67 BPM.    Patient Reported Event #1 There were no patient reported events    9 runs of VT, longest 3 beats    356 runs of SVT longest 7 beats    The patient was monitored for a total of 12d 11h, underlying rhythm is   Sinus.  The minimum heart rate was 54 bpm; the maximum 88 bpm; the average 67 bpm.  0 % of Atrial fibrillation/Atrial flutter with longest episode of 0 ms.  -- AV block with 0 %  There were 0 pauses, the longest pause was 0 ms at --.  9 episodes of VT were found with Longest VT at 3 beats .  358 supraventricular episodes were found. Longest SVT Episode 7 beats,   Fastest  bpm  There were a total of 37889 PVCs with 2 morphologies and 1357 couplets.   Overall PVC [place] at 3.47  %  There were a total of 20809 PSVCs with 1 morphologies and 312 couplets.   Overall PSVC [place] at  1.44 %  There is a total of 0 patient events.              Past Medical History:   Diagnosis Date    Benign paroxysmal positional vertigo 2023    GERD without esophagitis 2023    Heart disease     Hypertension     Syphilis 2020       Past Surgical History:   Procedure Laterality Date    BILATERAL TUBAL LIGATION      TETRALOGY OF FALLOT REPAIR         Social History     Tobacco Use    Smoking status: Former     Current packs/day: 0.00     Types: Cigarettes     Quit date: 2022     Years since quittin.3    Smokeless tobacco: Never   Substance Use Topics    Alcohol use: Not Currently    Drug use: Never       Family History   Problem Relation Age of Onset    Prostate cancer Father     Ovarian cancer Cousin        Patient's Medications   New Prescriptions    No medications on file   Previous Medications    AMLODIPINE (NORVASC) 5 MG TABLET    Take 1 tablet by mouth every day    ASPIRIN (ECOTRIN) 81 MG EC TABLET    Take 1 tablet by mouth once daily.    BENZONATATE (TESSALON) 200 MG CAPSULE    TAKE 1 CAPSULE (ORAL) 3 TIMES PER DAY AS NEEDED FOR COUGH DO NOT CUT, BITE, CRUSH OR CHEW.    CETIRIZINE  (ZYRTEC) 10 MG TABLET    Take 10 mg by mouth every evening.    DESLORATADINE (CLARINEX) 5 MG TABLET    Take 1 tablet (5 mg total) by mouth once daily. for 7 days    DEXCHLORPHEN-PHENYLEPHRINE-DM 1-5-10 MG/5 ML SYRP    Take 10 mLs by mouth every 6 to 8 hours as needed (cough/congestion).    FLUTICASONE PROPIONATE (FLONASE ALLERGY RELIEF) 50 MCG/ACTUATION NASAL SPRAY    Flonase Allergy Relief Take 1 Puff(s) (nasal - bilaterally) 2 times per day for 7 days 20221129 spray,suspension 2 times per day nasal 7 days suspended 50 mcg/actuation    FLUTICASONE PROPIONATE (FLONASE) 50 MCG/ACTUATION NASAL SPRAY    1 spray (50 mcg total) by Each Nostril route once daily.    GABAPENTIN (NEURONTIN) 300 MG CAPSULE    Take 1 capsule by mouth 3 (three) times daily.    HYDROCHLOROTHIAZIDE (HYDRODIURIL) 25 MG TABLET    Take 1 tablet (25 mg total) by mouth once daily.    LEVOCETIRIZINE (XYZAL) 5 MG TABLET    Take 1 tablet (5 mg total) by mouth every evening.    MECLIZINE (ANTIVERT) 25 MG TABLET    Take 1 tablet (25 mg total) by mouth 3 (three) times daily as needed for Dizziness.    METHYLPREDNISOLONE (MEDROL DOSEPACK) 4 MG TABLET    use as directed    METOPROLOL SUCCINATE (TOPROL-XL) 50 MG 24 HR TABLET    Take 1 tablet (50 mg total) by mouth 2 (two) times daily.    PANTOPRAZOLE (PROTONIX) 40 MG TABLET    Take 1 tablet (40 mg total) by mouth once daily.   Modified Medications    No medications on file   Discontinued Medications    No medications on file       Review of Systems   Constitutional:  Positive for malaise/fatigue.   HENT: Negative.     Eyes: Negative.    Respiratory:  Positive for shortness of breath.    Cardiovascular:  Positive for chest pain and palpitations.   Gastrointestinal: Negative.    Genitourinary: Negative.    Musculoskeletal: Negative.    Skin: Negative.    Neurological:  Positive for dizziness and weakness.   Endo/Heme/Allergies: Negative.    Psychiatric/Behavioral: Negative.     All 12 systems otherwise  "negative.      Wt Readings from Last 3 Encounters:   09/20/23 103.4 kg (227 lb 15.3 oz)   09/06/23 99.8 kg (220 lb)   08/10/23 100.8 kg (222 lb 3.6 oz)     Temp Readings from Last 3 Encounters:   09/06/23 98.2 °F (36.8 °C) (Tympanic)   07/27/23 97.4 °F (36.3 °C) (Temporal)   02/03/23 98.8 °F (37.1 °C) (Tympanic)     BP Readings from Last 3 Encounters:   09/20/23 (!) 150/88   09/06/23 137/65   08/10/23 124/80     Pulse Readings from Last 3 Encounters:   09/20/23 66   09/06/23 80   08/10/23 67       BP (!) 150/88 (BP Location: Left arm, Patient Position: Sitting, BP Method: Medium (Manual))   Pulse 66   Ht 5' 8" (1.727 m)   Wt 103.4 kg (227 lb 15.3 oz)   LMP 08/15/2023 (Approximate)   SpO2 95%   BMI 34.66 kg/m²     Objective:   Physical Exam  Vitals and nursing note reviewed.   Constitutional:       General: She is not in acute distress.     Appearance: She is well-developed. She is not diaphoretic.   HENT:      Head: Normocephalic and atraumatic.      Nose: Nose normal.   Eyes:      General: No scleral icterus.     Conjunctiva/sclera: Conjunctivae normal.   Neck:      Thyroid: No thyromegaly.      Vascular: No JVD.   Cardiovascular:      Rate and Rhythm: Normal rate and regular rhythm.      Heart sounds: S1 normal and S2 normal. Murmur heard.      No friction rub. No gallop. No S3 or S4 sounds.   Pulmonary:      Effort: Pulmonary effort is normal. No respiratory distress.      Breath sounds: Normal breath sounds. No stridor. No wheezing or rales.   Chest:      Chest wall: No tenderness.   Abdominal:      General: Bowel sounds are normal. There is no distension.      Palpations: Abdomen is soft. There is no mass.      Tenderness: There is no abdominal tenderness. There is no rebound.   Genitourinary:     Comments: Deferred  Musculoskeletal:         General: No tenderness or deformity. Normal range of motion.      Cervical back: Normal range of motion and neck supple.   Lymphadenopathy:      Cervical: No cervical " adenopathy.   Skin:     General: Skin is warm and dry.      Coloration: Skin is not pale.      Findings: No erythema or rash.   Neurological:      Mental Status: She is alert and oriented to person, place, and time.      Motor: No abnormal muscle tone.      Coordination: Coordination normal.   Psychiatric:         Behavior: Behavior normal.         Thought Content: Thought content normal.         Judgment: Judgment normal.         Lab Results   Component Value Date     07/21/2023    K 3.9 07/21/2023     07/21/2023    CO2 24 07/21/2023    BUN 13 07/21/2023    CREATININE 0.8 07/21/2023     07/21/2023    HGBA1C 5.7 (H) 11/05/2021    MG 2.0 07/21/2023    AST 13 07/21/2023    ALT 13 07/21/2023    ALBUMIN 4.1 07/21/2023    PROT 8.2 07/21/2023    BILITOT 0.6 07/21/2023    WBC 10.08 03/13/2023    HGB 12.9 03/13/2023    HCT 41.2 03/13/2023    MCV 89 03/13/2023     03/13/2023    TSH 3.760 11/05/2021    CHOL 129 11/05/2021    HDL 38 (L) 11/05/2021    LDLCALC 73 11/05/2021    TRIG 92 11/05/2021    BNP 74 07/21/2023         BNP (pg/mL)   Date Value   07/21/2023 74          Assessment:      1. RBBB    2. PVC's (premature ventricular contractions)    3. S/P repair of tetralogy of Fallot    4. Class 1 obesity due to excess calories without serious comorbidity with body mass index (BMI) of 31.0 to 31.9 in adult    5. Cardiac murmur    6. Primary hypertension    7. Nonrheumatic pulmonary valve insufficiency    8. Iron deficiency anemia due to chronic blood loss    9. Pulmonary hypertension          Plan:         Bi V CHF, with PI; Congenital heart disease s/p Tetraology of Fallor repair - repaired at age 1, pulm HTN  - ECHO Jan 2023 with normal bi V function, mild RV enlargement with pressure overload, int CVP, PASP 43 mmHg.   - refer to adult congenital cardiologist - had prior appt but missed, will reorder - will reorder again   - has appt to see cardiothoracic surgeon for valve replacement     2. HTN,  RBBB - elevated with PVCs  - Vital monitor 7/2023 with frequent short tuns and 9 NSVT - refer to EP for SVT  - titrate meds  - inc HCTZ to 25mg,   - inc BB to 50mg BID    3. Fe def anemia  - cont tx per PCP/heme  - had infusions , considered hysterectomy but needs Pulmonic valve replaced     4. Obesity BMI 34 - 227 lbs   - cont weight loss with diet/exercise     5. Vertigo, dizziness  - PRN Meclizine   - refer to ENT     6. DARLING sx  - refer to pul, sleep center - pending     7. Preop CV eval - EGD/Cscope in November   - elevated CV risk due to congenital heart disease with repair  - proceed for scopes as needee     Thank you for allowing me to participate in this patient's care. Please do not hesitate to contact me with any questions or concerns. Consult note has been forwarded to the referral physician.

## 2023-09-27 ENCOUNTER — PATIENT MESSAGE (OUTPATIENT)
Dept: CARDIOLOGY | Facility: CLINIC | Age: 48
End: 2023-09-27
Payer: COMMERCIAL

## 2023-09-27 DIAGNOSIS — J98.4 PULMONARY INSUFFICIENCY: Primary | ICD-10-CM

## 2023-09-27 DIAGNOSIS — Z87.74 S/P REPAIR OF TETRALOGY OF FALLOT: ICD-10-CM

## 2023-10-24 ENCOUNTER — PATIENT MESSAGE (OUTPATIENT)
Dept: CARDIOLOGY | Facility: CLINIC | Age: 48
End: 2023-10-24
Payer: COMMERCIAL

## 2023-10-25 ENCOUNTER — OFFICE VISIT (OUTPATIENT)
Dept: CARDIOLOGY | Facility: CLINIC | Age: 48
End: 2023-10-25
Payer: COMMERCIAL

## 2023-10-25 VITALS
OXYGEN SATURATION: 95 % | SYSTOLIC BLOOD PRESSURE: 120 MMHG | BODY MASS INDEX: 34.45 KG/M2 | HEART RATE: 83 BPM | WEIGHT: 227.31 LBS | DIASTOLIC BLOOD PRESSURE: 80 MMHG | HEIGHT: 68 IN

## 2023-10-25 DIAGNOSIS — I37.1 NONRHEUMATIC PULMONARY VALVE INSUFFICIENCY: ICD-10-CM

## 2023-10-25 DIAGNOSIS — I47.10 SVT (SUPRAVENTRICULAR TACHYCARDIA): ICD-10-CM

## 2023-10-25 DIAGNOSIS — Z76.89 ENCOUNTER TO ESTABLISH CARE: ICD-10-CM

## 2023-10-25 DIAGNOSIS — Z87.74 S/P REPAIR OF TETRALOGY OF FALLOT: ICD-10-CM

## 2023-10-25 PROCEDURE — 3079F DIAST BP 80-89 MM HG: CPT | Mod: CPTII,S$GLB,, | Performed by: INTERNAL MEDICINE

## 2023-10-25 PROCEDURE — 93010 EKG 12-LEAD: ICD-10-PCS | Mod: S$GLB,,, | Performed by: INTERNAL MEDICINE

## 2023-10-25 PROCEDURE — 3074F PR MOST RECENT SYSTOLIC BLOOD PRESSURE < 130 MM HG: ICD-10-PCS | Mod: CPTII,S$GLB,, | Performed by: INTERNAL MEDICINE

## 2023-10-25 PROCEDURE — 99204 PR OFFICE/OUTPT VISIT, NEW, LEVL IV, 45-59 MIN: ICD-10-PCS | Mod: S$GLB,,, | Performed by: INTERNAL MEDICINE

## 2023-10-25 PROCEDURE — 93005 ELECTROCARDIOGRAM TRACING: CPT

## 2023-10-25 PROCEDURE — 99204 OFFICE O/P NEW MOD 45 MIN: CPT | Mod: S$GLB,,, | Performed by: INTERNAL MEDICINE

## 2023-10-25 PROCEDURE — 99999 PR PBB SHADOW E&M-EST. PATIENT-LVL V: ICD-10-PCS | Mod: PBBFAC,,, | Performed by: INTERNAL MEDICINE

## 2023-10-25 PROCEDURE — 1159F MED LIST DOCD IN RCRD: CPT | Mod: CPTII,S$GLB,, | Performed by: INTERNAL MEDICINE

## 2023-10-25 PROCEDURE — 3079F PR MOST RECENT DIASTOLIC BLOOD PRESSURE 80-89 MM HG: ICD-10-PCS | Mod: CPTII,S$GLB,, | Performed by: INTERNAL MEDICINE

## 2023-10-25 PROCEDURE — 3008F BODY MASS INDEX DOCD: CPT | Mod: CPTII,S$GLB,, | Performed by: INTERNAL MEDICINE

## 2023-10-25 PROCEDURE — 3074F SYST BP LT 130 MM HG: CPT | Mod: CPTII,S$GLB,, | Performed by: INTERNAL MEDICINE

## 2023-10-25 PROCEDURE — 1160F RVW MEDS BY RX/DR IN RCRD: CPT | Mod: CPTII,S$GLB,, | Performed by: INTERNAL MEDICINE

## 2023-10-25 PROCEDURE — 1159F PR MEDICATION LIST DOCUMENTED IN MEDICAL RECORD: ICD-10-PCS | Mod: CPTII,S$GLB,, | Performed by: INTERNAL MEDICINE

## 2023-10-25 PROCEDURE — 93010 ELECTROCARDIOGRAM REPORT: CPT | Mod: S$GLB,,, | Performed by: INTERNAL MEDICINE

## 2023-10-25 PROCEDURE — 1160F PR REVIEW ALL MEDS BY PRESCRIBER/CLIN PHARMACIST DOCUMENTED: ICD-10-PCS | Mod: CPTII,S$GLB,, | Performed by: INTERNAL MEDICINE

## 2023-10-25 PROCEDURE — 3008F PR BODY MASS INDEX (BMI) DOCUMENTED: ICD-10-PCS | Mod: CPTII,S$GLB,, | Performed by: INTERNAL MEDICINE

## 2023-10-25 PROCEDURE — 99999 PR PBB SHADOW E&M-EST. PATIENT-LVL V: CPT | Mod: PBBFAC,,, | Performed by: INTERNAL MEDICINE

## 2023-10-25 NOTE — PROGRESS NOTES
Subjective:   Patient ID:  Mer Priest is a 48 y.o. female who presents for evaluation of tetrology of fallot      48 yoF here for arrhythmia management. She had ToF repair as a child (age 1). No significant arrhythmias to her knowledge. She had rare ectopy on event monitor. She had an MRI at [facility] and work up for PV repair was recommended. She thought that this clinic would assess her PV. She had an appointment with Dr Roberts but could not keep it. She has a repeat appointment in DEcember with him.     Event monitor 7/23:  The patient was monitored for a total of 12d 11h, underlying rhythm is Sinus.  The minimum heart rate was 54 bpm; the maximum 88 bpm; the average 67 bpm.  0 % of Atrial fibrillation/Atrial flutter with longest episode of 0 ms.    There were 0 pauses, the longest pause was 0 ms at --.  9 episodes of VT were found with Longest VT at 3 beats .  358 supraventricular episodes were found. Longest SVT Episode 7 beats, Fastest  bpm  There were a total of 35351 PVCs with 2 morphologies and 1357 couplets. Overall PVC [place] at 3.47%  There were a total of 08357 PSVCs with 1 morphologies and 312 couplets. Overall PSVC [place] at 1.44 %      Cardiac MRI IMPRESSION 9/23:     Repaired tetralogy of Fallot with dilated right ventricle  mL (normal ), EDV/.15 mL/m2 (normal 57-95). Mildly decreased right ventricular function, RVEF =38%.   Mild left ventricular dilatation, mild to moderately reduced left ventricular function, LVEF =45%.   Small diverticulum of the anterior septum.   Late gadolinium enhancement at the right ventricular outflow tract likely related to patch repair.   Severely dilated right ventricular outflow tract with free pulmonic insufficiency.     Echo 1/23:  · The left ventricle is normal in size with concentric hypertrophy and normal systolic function.  · Moderate left atrial enlargement.  · There is abnormal septal wall motion. There is systolic flattening of the  interventricular septum consistent with right ventricle pressure overload.  · Intermediate central venous pressure (8 mmHg).  · The estimated PA systolic pressure is 43 mmHg.  · There is pulmonary hypertension.  · The estimated ejection fraction is 55%.  · Indeterminate left ventricular diastolic function.  · Mild right ventricular enlargement with normal right ventricular systolic function.  · Right atrial enlargement.    Past Medical History:  2023: Benign paroxysmal positional vertigo  2023: GERD without esophagitis  No date: Heart disease  No date: Hypertension  2020: Syphilis    Past Surgical History:  No date: BILATERAL TUBAL LIGATION  : TETRALOGY OF FALLOT REPAIR    Social History    Socioeconomic History      Marital status: Single    Tobacco Use      Smoking status: Former        Packs/day: 0.00        Types: Cigarettes        Quit date: 2022        Years since quittin.4      Smokeless tobacco: Never    Substance and Sexual Activity      Alcohol use: Not Currently      Drug use: Never      Sexual activity: Not Currently    Review of patient's family history indicates:  Problem: Prostate cancer      Relation: Father          Age of Onset: (Not Specified)  Problem: Ovarian cancer      Relation: Cousin          Age of Onset: (Not Specified)    Current Outpatient Medications:  amLODIPine (NORVASC) 5 MG tablet, Take 1 tablet by mouth every day, Disp: 90 tablet, Rfl: 3  aspirin (ECOTRIN) 81 MG EC tablet, Take 1 tablet by mouth once daily., Disp: , Rfl:   cetirizine (ZYRTEC) 10 MG tablet, Take 10 mg by mouth every evening., Disp: , Rfl:   fluticasone propionate (FLONASE ALLERGY RELIEF) 50 mcg/actuation nasal spray, Flonase Allergy Relief Take 1 Puff(s) (nasal - bilaterally) 2 times per day for 7 days 2022 spray,suspension 2 times per day nasal 7 days suspended 50 mcg/actuation, Disp: , Rfl:   gabapentin (NEURONTIN) 300 MG capsule, Take 1 capsule by mouth 3 (three) times daily., Disp: ,  Rfl:   hydroCHLOROthiazide (HYDRODIURIL) 25 MG tablet, Take 1 tablet (25 mg total) by mouth once daily., Disp: 90 tablet, Rfl: 1  metoprolol succinate (TOPROL-XL) 50 MG 24 hr tablet, Take 1 tablet (50 mg total) by mouth 2 (two) times daily., Disp: 180 tablet, Rfl: 3  benzonatate (TESSALON) 200 MG capsule, TAKE 1 CAPSULE (ORAL) 3 TIMES PER DAY AS NEEDED FOR COUGH DO NOT CUT, BITE, CRUSH OR CHEW., Disp: , Rfl:   desloratadine (CLARINEX) 5 mg tablet, Take 1 tablet (5 mg total) by mouth once daily. for 7 days (Patient not taking: Reported on 9/20/2023), Disp: 7 tablet, Rfl: 0  dexchlorphen-phenylephrine-DM 1-5-10 mg/5 mL Syrp, Take 10 mLs by mouth every 6 to 8 hours as needed (cough/congestion). (Patient not taking: Reported on 9/20/2023), Disp: 140 mL, Rfl: 0  fluticasone propionate (FLONASE) 50 mcg/actuation nasal spray, 1 spray (50 mcg total) by Each Nostril route once daily. (Patient not taking: Reported on 9/20/2023), Disp: 15.8 mL, Rfl: 0  levocetirizine (XYZAL) 5 MG tablet, Take 1 tablet (5 mg total) by mouth every evening. (Patient not taking: Reported on 9/20/2023), Disp: 30 tablet, Rfl: 11  meclizine (ANTIVERT) 25 mg tablet, Take 1 tablet (25 mg total) by mouth 3 (three) times daily as needed for Dizziness. (Patient not taking: Reported on 9/20/2023), Disp: 30 tablet, Rfl: 0  pantoprazole (PROTONIX) 40 MG tablet, Take 1 tablet (40 mg total) by mouth once daily. (Patient not taking: Reported on 9/20/2023), Disp: 90 tablet, Rfl: 3    No current facility-administered medications for this visit.          Review of Systems   Constitutional: Negative.   HENT: Negative.     Eyes: Negative.    Cardiovascular:  Negative for chest pain, dyspnea on exertion, leg swelling, near-syncope, palpitations and syncope.   Respiratory: Negative.  Negative for shortness of breath.    Endocrine: Negative.    Hematologic/Lymphatic: Negative.    Skin: Negative.    Musculoskeletal: Negative.    Gastrointestinal: Negative.     Genitourinary: Negative.    Neurological: Negative.  Negative for dizziness and light-headedness.   Psychiatric/Behavioral: Negative.     Allergic/Immunologic: Negative.        Objective:   Physical Exam  Vitals and nursing note reviewed.   Constitutional:       General: She is not in acute distress.     Appearance: She is well-developed. She is not diaphoretic.   HENT:      Head: Normocephalic and atraumatic.      Nose: Nose normal.   Eyes:      General: No scleral icterus.     Conjunctiva/sclera: Conjunctivae normal.      Pupils: Pupils are equal, round, and reactive to light.   Neck:      Thyroid: No thyromegaly.      Vascular: No JVD.   Cardiovascular:      Rate and Rhythm: Normal rate and regular rhythm.      Chest Wall: PMI is not displaced.      Heart sounds: Normal heart sounds, S1 normal and S2 normal. No murmur heard.     No friction rub. No gallop. No S3 or S4 sounds.   Pulmonary:      Effort: Pulmonary effort is normal. No respiratory distress.      Breath sounds: Normal breath sounds. No stridor. No wheezing or rales.   Chest:      Chest wall: No tenderness.   Abdominal:      General: Bowel sounds are normal. There is no distension.      Palpations: Abdomen is soft. There is no mass.      Tenderness: There is no abdominal tenderness. There is no guarding or rebound.   Genitourinary:     Comments: Deferred  Musculoskeletal:         General: No tenderness or deformity. Normal range of motion.      Cervical back: Normal range of motion and neck supple.   Lymphadenopathy:      Cervical: No cervical adenopathy.   Skin:     General: Skin is warm and dry.      Coloration: Skin is not pale.      Findings: No erythema or rash.   Neurological:      Mental Status: She is alert and oriented to person, place, and time.      Cranial Nerves: No cranial nerve deficit.      Motor: No abnormal muscle tone.      Coordination: Coordination normal.   Psychiatric:         Behavior: Behavior normal.         Thought Content:  Thought content normal.         Judgment: Judgment normal.     ECG: NSR RBBB 165 ms    Assessment:      1. SVT (supraventricular tachycardia)    2. S/P repair of tetralogy of Fallot    3. Nonrheumatic pulmonary valve insufficiency        Plan:   48 yoF here for arrhythmia assessment. She has no findings requiring escalation of arrhythmia therapy or further work up. Defer to Dr Roberts for PV assessment. RTC 6m    Addendum  Cardiac MRI was performed 6/22 not 9/23

## 2023-10-26 DIAGNOSIS — D64.9 ANEMIA, UNSPECIFIED TYPE: Primary | ICD-10-CM

## 2023-10-27 ENCOUNTER — LAB VISIT (OUTPATIENT)
Dept: LAB | Facility: HOSPITAL | Age: 48
End: 2023-10-27
Attending: NURSE PRACTITIONER
Payer: COMMERCIAL

## 2023-10-27 DIAGNOSIS — D64.9 ANEMIA, UNSPECIFIED TYPE: ICD-10-CM

## 2023-10-27 LAB
BASOPHILS # BLD AUTO: 0.04 K/UL (ref 0–0.2)
BASOPHILS NFR BLD: 0.4 % (ref 0–1.9)
DIFFERENTIAL METHOD: ABNORMAL
EOSINOPHIL # BLD AUTO: 0.2 K/UL (ref 0–0.5)
EOSINOPHIL NFR BLD: 2.1 % (ref 0–8)
ERYTHROCYTE [DISTWIDTH] IN BLOOD BY AUTOMATED COUNT: 15.7 % (ref 11.5–14.5)
HCT VFR BLD AUTO: 37.3 % (ref 37–48.5)
HGB BLD-MCNC: 11.6 G/DL (ref 12–16)
IMM GRANULOCYTES # BLD AUTO: 0.03 K/UL (ref 0–0.04)
IMM GRANULOCYTES NFR BLD AUTO: 0.3 % (ref 0–0.5)
LYMPHOCYTES # BLD AUTO: 2.1 K/UL (ref 1–4.8)
LYMPHOCYTES NFR BLD: 21.1 % (ref 18–48)
MCH RBC QN AUTO: 26 PG (ref 27–31)
MCHC RBC AUTO-ENTMCNC: 31.1 G/DL (ref 32–36)
MCV RBC AUTO: 83 FL (ref 82–98)
MONOCYTES # BLD AUTO: 0.8 K/UL (ref 0.3–1)
MONOCYTES NFR BLD: 7.9 % (ref 4–15)
NEUTROPHILS # BLD AUTO: 6.8 K/UL (ref 1.8–7.7)
NEUTROPHILS NFR BLD: 68.2 % (ref 38–73)
NRBC BLD-RTO: 0 /100 WBC
PLATELET # BLD AUTO: 253 K/UL (ref 150–450)
PMV BLD AUTO: 11.2 FL (ref 9.2–12.9)
RBC # BLD AUTO: 4.47 M/UL (ref 4–5.4)
WBC # BLD AUTO: 10.03 K/UL (ref 3.9–12.7)

## 2023-10-27 PROCEDURE — 82728 ASSAY OF FERRITIN: CPT | Performed by: NURSE PRACTITIONER

## 2023-10-27 PROCEDURE — 36415 COLL VENOUS BLD VENIPUNCTURE: CPT | Performed by: NURSE PRACTITIONER

## 2023-10-27 PROCEDURE — 83540 ASSAY OF IRON: CPT | Performed by: NURSE PRACTITIONER

## 2023-10-27 PROCEDURE — 85025 COMPLETE CBC W/AUTO DIFF WBC: CPT | Performed by: NURSE PRACTITIONER

## 2023-10-27 PROCEDURE — 84466 ASSAY OF TRANSFERRIN: CPT | Performed by: NURSE PRACTITIONER

## 2023-10-28 LAB
FERRITIN SERPL-MCNC: 15 NG/ML (ref 20–300)
IRON SERPL-MCNC: 82 UG/DL (ref 30–160)
SATURATED IRON: 16 % (ref 20–50)
TOTAL IRON BINDING CAPACITY: 509 UG/DL (ref 250–450)
TRANSFERRIN SERPL-MCNC: 344 MG/DL (ref 200–375)

## 2023-10-31 ENCOUNTER — TELEPHONE (OUTPATIENT)
Dept: HEMATOLOGY/ONCOLOGY | Facility: CLINIC | Age: 48
End: 2023-10-31
Payer: COMMERCIAL

## 2023-10-31 NOTE — TELEPHONE ENCOUNTER
Reached out to patient to confirm which virtual appointment she wanted with KEYLA Blanchard. Confirmed she wanted the appointment on 11/2.

## 2023-11-01 PROBLEM — F32.1 CURRENT MODERATE EPISODE OF MAJOR DEPRESSIVE DISORDER WITHOUT PRIOR EPISODE: Status: ACTIVE | Noted: 2018-11-16

## 2023-11-01 PROBLEM — G56.03 BILATERAL CARPAL TUNNEL SYNDROME: Status: ACTIVE | Noted: 2018-11-16

## 2023-11-01 PROBLEM — E66.9 OBESITY WITH BODY MASS INDEX 30 OR GREATER: Status: ACTIVE | Noted: 2023-05-08

## 2023-11-02 ENCOUNTER — OFFICE VISIT (OUTPATIENT)
Dept: HEMATOLOGY/ONCOLOGY | Facility: CLINIC | Age: 48
End: 2023-11-02
Payer: COMMERCIAL

## 2023-11-02 DIAGNOSIS — E61.1 IRON DEFICIENCY: ICD-10-CM

## 2023-11-02 DIAGNOSIS — D50.0 IRON DEFICIENCY ANEMIA DUE TO CHRONIC BLOOD LOSS: Primary | ICD-10-CM

## 2023-11-02 DIAGNOSIS — E66.09 CLASS 1 OBESITY DUE TO EXCESS CALORIES WITHOUT SERIOUS COMORBIDITY WITH BODY MASS INDEX (BMI) OF 31.0 TO 31.9 IN ADULT: ICD-10-CM

## 2023-11-02 PROCEDURE — 99214 PR OFFICE/OUTPT VISIT, EST, LEVL IV, 30-39 MIN: ICD-10-PCS | Mod: 95,,, | Performed by: NURSE PRACTITIONER

## 2023-11-02 PROCEDURE — 1160F RVW MEDS BY RX/DR IN RCRD: CPT | Mod: CPTII,95,, | Performed by: NURSE PRACTITIONER

## 2023-11-02 PROCEDURE — 1159F MED LIST DOCD IN RCRD: CPT | Mod: CPTII,95,, | Performed by: NURSE PRACTITIONER

## 2023-11-02 PROCEDURE — 1159F PR MEDICATION LIST DOCUMENTED IN MEDICAL RECORD: ICD-10-PCS | Mod: CPTII,95,, | Performed by: NURSE PRACTITIONER

## 2023-11-02 PROCEDURE — 99214 OFFICE O/P EST MOD 30 MIN: CPT | Mod: 95,,, | Performed by: NURSE PRACTITIONER

## 2023-11-02 PROCEDURE — 1160F PR REVIEW ALL MEDS BY PRESCRIBER/CLIN PHARMACIST DOCUMENTED: ICD-10-PCS | Mod: CPTII,95,, | Performed by: NURSE PRACTITIONER

## 2023-11-02 RX ORDER — HEPARIN 100 UNIT/ML
500 SYRINGE INTRAVENOUS
Status: CANCELLED | OUTPATIENT
Start: 2023-11-02

## 2023-11-02 RX ORDER — HEPARIN 100 UNIT/ML
500 SYRINGE INTRAVENOUS
Status: CANCELLED | OUTPATIENT
Start: 2023-11-22

## 2023-11-02 RX ORDER — SODIUM CHLORIDE 0.9 % (FLUSH) 0.9 %
10 SYRINGE (ML) INJECTION
Status: CANCELLED | OUTPATIENT
Start: 2023-11-02

## 2023-11-02 RX ORDER — METHYLPREDNISOLONE SOD SUCC 125 MG
40 VIAL (EA) INJECTION
Status: CANCELLED
Start: 2023-11-22

## 2023-11-02 RX ORDER — METHYLPREDNISOLONE SOD SUCC 125 MG
40 VIAL (EA) INJECTION
Status: CANCELLED
Start: 2023-11-02

## 2023-11-02 RX ORDER — SODIUM CHLORIDE 0.9 % (FLUSH) 0.9 %
10 SYRINGE (ML) INJECTION
Status: CANCELLED | OUTPATIENT
Start: 2023-11-22

## 2023-11-02 NOTE — ASSESSMENT & PLAN NOTE
Recurrent iron deficiency anemia.     Arrange feraheme weekly x 2. F/u 2 months with cbc, iron, ferritin 1-2 days prior

## 2023-11-02 NOTE — PROGRESS NOTES
Subjective:       Patient ID: Mer Priest is a 48 y.o. female.    Chief Complaint: iron deficiency anemia    The patient location is: work  The chief complaint leading to consultation is: anemia    Visit type: audiovisual    Face to Face time with patient: 15 miutes  35 minutes of total time spent on the encounter, which includes face to face time and non-face to face time preparing to see the patient (eg, review of tests), Obtaining and/or reviewing separately obtained history, Documenting clinical information in the electronic or other health record, Independently interpreting results (not separately reported) and communicating results to the patient/family/caregiver, or Care coordination (not separately reported).         Each patient to whom he or she provides medical services by telemedicine is:  (1) informed of the relationship between the physician and patient and the respective role of any other health care provider with respect to management of the patient; and (2) notified that he or she may decline to receive medical services by telemedicine and may withdraw from such care at any time.    Notes:       HPI: 48 y.o female following up for iron deficiency anemia believed to be secondary to menorrhagia. 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 She has upcoming Cardiology appointment to discuss. Also previously recommended upper and lower endoscopies but unable to proceed due to needing cardiac clearance. Patient previously treated with Venofer with good response.     Social History     Socioeconomic History    Marital status: Single   Tobacco Use    Smoking status: Former     Current packs/day: 0.00     Types: Cigarettes     Quit date: 2022     Years since quittin.5    Smokeless tobacco: Never   Substance and Sexual Activity    Alcohol use: Not Currently    Drug use: Never    Sexual activity: Not Currently       Past Medical  History:   Diagnosis Date    Benign paroxysmal positional vertigo 7/25/2023    GERD without esophagitis 7/25/2023    Heart disease     Hypertension     Syphilis 11/2020       Family History   Problem Relation Age of Onset    Prostate cancer Father     Ovarian cancer Cousin        Past Surgical History:   Procedure Laterality Date    BILATERAL TUBAL LIGATION      TETRALOGY OF FALLOT REPAIR  1977       Review of Systems   Constitutional:  Positive for activity change and fatigue. Negative for appetite change, chills, fever and unexpected weight change.   HENT:  Negative for congestion, mouth sores, nosebleeds, sore throat, trouble swallowing and voice change.    Respiratory:  Negative for cough, chest tightness, shortness of breath and wheezing.    Cardiovascular:  Negative for chest pain and leg swelling.   Gastrointestinal:  Negative for abdominal distention, abdominal pain, blood in stool, constipation, diarrhea, nausea and vomiting.   Genitourinary:  Positive for menstrual problem. Negative for difficulty urinating, dysuria and hematuria.   Musculoskeletal:  Negative for arthralgias, back pain and myalgias.   Skin:  Negative for pallor, rash and wound.   Neurological:  Negative for dizziness, syncope, weakness and headaches.   Hematological:  Negative for adenopathy. Does not bruise/bleed easily.   Psychiatric/Behavioral:  The patient is nervous/anxious.          Medication List with Changes/Refills   Current Medications    AMLODIPINE (NORVASC) 5 MG TABLET    Take 1 tablet by mouth every day    ASPIRIN (ECOTRIN) 81 MG EC TABLET    Take 1 tablet by mouth once daily.    BENZONATATE (TESSALON) 200 MG CAPSULE    TAKE 1 CAPSULE (ORAL) 3 TIMES PER DAY AS NEEDED FOR COUGH DO NOT CUT, BITE, CRUSH OR CHEW.    CETIRIZINE (ZYRTEC) 10 MG TABLET    Take 10 mg by mouth every evening.    DESLORATADINE (CLARINEX) 5 MG TABLET    Take 1 tablet (5 mg total) by mouth once daily. for 7 days    DEXCHLORPHEN-PHENYLEPHRINE-DM 1-5-10 MG/5  ML SYRP    Take 10 mLs by mouth every 6 to 8 hours as needed (cough/congestion).    FLUTICASONE PROPIONATE (FLONASE ALLERGY RELIEF) 50 MCG/ACTUATION NASAL SPRAY    Flonase Allergy Relief Take 1 Puff(s) (nasal - bilaterally) 2 times per day for 7 days 20221129 spray,suspension 2 times per day nasal 7 days suspended 50 mcg/actuation    FLUTICASONE PROPIONATE (FLONASE) 50 MCG/ACTUATION NASAL SPRAY    1 spray (50 mcg total) by Each Nostril route once daily.    GABAPENTIN (NEURONTIN) 300 MG CAPSULE    Take 1 capsule by mouth 3 (three) times daily.    HYDROCHLOROTHIAZIDE (HYDRODIURIL) 25 MG TABLET    Take 1 tablet (25 mg total) by mouth once daily.    LEVOCETIRIZINE (XYZAL) 5 MG TABLET    Take 1 tablet (5 mg total) by mouth every evening.    MECLIZINE (ANTIVERT) 25 MG TABLET    Take 1 tablet (25 mg total) by mouth 3 (three) times daily as needed for Dizziness.    METOPROLOL SUCCINATE (TOPROL-XL) 50 MG 24 HR TABLET    Take 1 tablet (50 mg total) by mouth 2 (two) times daily.    PANTOPRAZOLE (PROTONIX) 40 MG TABLET    Take 1 tablet (40 mg total) by mouth once daily.     Objective:   There were no vitals filed for this visit.    Physical Exam  Pulmonary:      Effort: No respiratory distress.   Neurological:      Mental Status: She is alert and oriented to person, place, and time.          Assessment:     Problem List Items Addressed This Visit          Oncology    Iron deficiency anemia due to chronic blood loss - Primary     Recurrent iron deficiency anemia.     Arrange feraheme weekly x 2. F/u 2 months with cbc, iron, ferritin 1-2 days prior         Relevant Orders    CBC Auto Differential    Iron and TIBC    Ferritin       Endocrine    Class 1 obesity due to excess calories without serious comorbidity with body mass index (BMI) of 31.0 to 31.9 in adult     Encourage healthy nutrition along with portion control. Encourage daily exercise          Other Visit Diagnoses       Iron deficiency                  Plan:     Iron  deficiency anemia due to chronic blood loss  -     CBC Auto Differential; Future; Expected date: 11/02/2023  -     Iron and TIBC; Future; Expected date: 11/02/2023  -     Ferritin; Future; Expected date: 11/02/2023    Iron deficiency    Class 1 obesity due to excess calories without serious comorbidity with body mass index (BMI) of 31.0 to 31.9 in adult    Other orders  -     methylPREDNISolone sodium succinate injection 40 mg  -     ferumoxytoL (FERAHEME) 510 mg in dextrose 5 % (D5W) 100 mL IVPB  -     sodium chloride 0.9% 250 mL flush bag  -     sodium chloride 0.9% flush 10 mL  -     heparin, porcine (PF) 100 unit/mL injection flush 500 Units  -     alteplase injection 2 mg  -     methylPREDNISolone sodium succinate injection 40 mg  -     ferumoxytoL (FERAHEME) 510 mg in dextrose 5 % (D5W) 100 mL IVPB  -     sodium chloride 0.9% 250 mL flush bag  -     sodium chloride 0.9% flush 10 mL  -     heparin, porcine (PF) 100 unit/mL injection flush 500 Units  -     alteplase injection 2 mg            Med Onc Chart Routing      Follow up with physician    Follow up with JAVAD 2 months.   Infusion scheduling note   feraheme weekly x 2   Injection scheduling note    Labs CBC, ferritin and iron and TIBC   Scheduling:  Preferred lab:  Lab interval:  1-2 days prior   Imaging None      Pharmacy appointment No pharmacy appointment needed      Other referrals       No additional referrals needed               SHERI Blanchard

## 2023-11-21 ENCOUNTER — INFUSION (OUTPATIENT)
Dept: INFUSION THERAPY | Facility: HOSPITAL | Age: 48
End: 2023-11-21
Attending: NURSE PRACTITIONER
Payer: COMMERCIAL

## 2023-11-21 VITALS
RESPIRATION RATE: 16 BRPM | TEMPERATURE: 98 F | DIASTOLIC BLOOD PRESSURE: 85 MMHG | OXYGEN SATURATION: 97 % | HEART RATE: 71 BPM | SYSTOLIC BLOOD PRESSURE: 147 MMHG

## 2023-11-21 DIAGNOSIS — D50.0 IRON DEFICIENCY ANEMIA DUE TO CHRONIC BLOOD LOSS: Primary | ICD-10-CM

## 2023-11-21 PROCEDURE — 63600175 PHARM REV CODE 636 W HCPCS: Performed by: NURSE PRACTITIONER

## 2023-11-21 PROCEDURE — 96374 THER/PROPH/DIAG INJ IV PUSH: CPT

## 2023-11-21 PROCEDURE — 96375 TX/PRO/DX INJ NEW DRUG ADDON: CPT

## 2023-11-21 PROCEDURE — 25000003 PHARM REV CODE 250: Performed by: NURSE PRACTITIONER

## 2023-11-21 RX ORDER — METHYLPREDNISOLONE SOD SUCC 125 MG
40 VIAL (EA) INJECTION
Status: COMPLETED | OUTPATIENT
Start: 2023-11-21 | End: 2023-11-21

## 2023-11-21 RX ORDER — SODIUM CHLORIDE 0.9 % (FLUSH) 0.9 %
10 SYRINGE (ML) INJECTION
Status: DISCONTINUED | OUTPATIENT
Start: 2023-11-21 | End: 2023-11-21 | Stop reason: HOSPADM

## 2023-11-21 RX ADMIN — METHYLPREDNISOLONE SODIUM SUCCINATE 40 MG: 125 INJECTION, POWDER, FOR SOLUTION INTRAMUSCULAR; INTRAVENOUS at 12:11

## 2023-11-21 RX ADMIN — FERUMOXYTOL 510 MG: 510 INJECTION INTRAVENOUS at 12:11

## 2023-11-21 NOTE — DISCHARGE INSTRUCTIONS
.[facility]  [address] [address]  [address] [address]  [phone] phone     [phone] fax  Hours of Operation: Monday- Friday 8:00am- 5:00pm  After hours phone  [phone]  Hematology / Oncology Physicians on call    Dr. Gualberto Espinoza      Nurse Practitioners:    Brianna Jones, RUDY Pierre, RUDY Saldana, RUDY James, PA      Please don't hesitate to call if you have any concerns.

## 2023-11-21 NOTE — PLAN OF CARE
Patient stated she was feeling well today with no complaints. Patient tolerated infusion well with no adverse reactions. Patient to follow up on 11/28/23.

## 2023-11-28 ENCOUNTER — INFUSION (OUTPATIENT)
Dept: INFUSION THERAPY | Facility: HOSPITAL | Age: 48
End: 2023-11-28
Attending: INTERNAL MEDICINE
Payer: COMMERCIAL

## 2023-11-28 VITALS
TEMPERATURE: 99 F | SYSTOLIC BLOOD PRESSURE: 136 MMHG | HEART RATE: 73 BPM | OXYGEN SATURATION: 98 % | DIASTOLIC BLOOD PRESSURE: 83 MMHG | RESPIRATION RATE: 18 BRPM

## 2023-11-28 DIAGNOSIS — D50.0 IRON DEFICIENCY ANEMIA DUE TO CHRONIC BLOOD LOSS: Primary | ICD-10-CM

## 2023-11-28 PROCEDURE — 96365 THER/PROPH/DIAG IV INF INIT: CPT

## 2023-11-28 PROCEDURE — 25000003 PHARM REV CODE 250: Performed by: NURSE PRACTITIONER

## 2023-11-28 PROCEDURE — 96375 TX/PRO/DX INJ NEW DRUG ADDON: CPT

## 2023-11-28 PROCEDURE — 63600175 PHARM REV CODE 636 W HCPCS: Mod: JZ,JG | Performed by: NURSE PRACTITIONER

## 2023-11-28 RX ADMIN — METHYLPREDNISOLONE SODIUM SUCCINATE 40 MG: 40 INJECTION, POWDER, FOR SOLUTION INTRAMUSCULAR; INTRAVENOUS at 11:11

## 2023-11-28 RX ADMIN — FERUMOXYTOL 510 MG: 510 INJECTION INTRAVENOUS at 11:11

## 2023-11-28 NOTE — PLAN OF CARE
Discussed plan of care with pt. Addressed any and ongoing concerns. Pt denies    Problem: Adult Inpatient Plan of Care  Goal: Plan of Care Review  Outcome: Ongoing, Progressing  Flowsheets (Taken 11/28/2023 1153)  Plan of Care Reviewed With: patient  Goal: Absence of Hospital-Acquired Illness or Injury  Outcome: Ongoing, Progressing  Intervention: Identify and Manage Fall Risk  Flowsheets (Taken 11/28/2023 1153)  Safety Promotion/Fall Prevention:   in recliner, wheels locked   nonskid shoes/socks when out of bed   room near unit station  Intervention: Prevent Infection  Flowsheets (Taken 11/28/2023 1153)  Infection Prevention:   hand hygiene promoted   equipment surfaces disinfected  Goal: Optimal Comfort and Wellbeing  Outcome: Ongoing, Progressing  Intervention: Monitor Pain and Promote Comfort  Flowsheets (Taken 11/28/2023 1153)  Pain Management Interventions: quiet environment facilitated  Intervention: Provide Person-Centered Care  Flowsheets (Taken 11/28/2023 1153)  Trust Relationship/Rapport:   care explained   questions encouraged   reassurance provided   choices provided   emotional support provided   thoughts/feelings acknowledged   questions answered   empathic listening provided

## 2023-11-29 ENCOUNTER — TELEPHONE (OUTPATIENT)
Dept: DIABETES | Facility: CLINIC | Age: 48
End: 2023-11-29
Payer: COMMERCIAL

## 2023-12-12 ENCOUNTER — PATIENT MESSAGE (OUTPATIENT)
Dept: CARDIOLOGY | Facility: CLINIC | Age: 48
End: 2023-12-12
Payer: COMMERCIAL

## 2023-12-12 ENCOUNTER — TELEPHONE (OUTPATIENT)
Dept: CARDIOLOGY | Facility: CLINIC | Age: 48
End: 2023-12-12
Payer: COMMERCIAL

## 2023-12-12 NOTE — TELEPHONE ENCOUNTER
Left callback name and number on voicemail   ----- Message from Nini Martinez RN sent at 12/12/2023 11:04 AM CST -----  Contact: [phone]    ----- Message -----  From: Nini Pack  Sent: 12/12/2023  11:00 AM CST  To: Armando GONCALVES Staff    Would like to receive medical advice.  Pt called and stated that she want to know if Medicaid approved Echo and if Medicaid did not approve Echo, pt want to know if she still can come to appt. Please call Pt to advise     Would they like a call back or a response via [facility]:  call    Additional information:  n/a

## 2023-12-14 ENCOUNTER — HOSPITAL ENCOUNTER (OUTPATIENT)
Dept: CARDIOLOGY | Facility: HOSPITAL | Age: 48
Discharge: HOME | End: 2023-12-14
Attending: PEDIATRICS
Payer: COMMERCIAL

## 2023-12-14 ENCOUNTER — OFFICE VISIT (OUTPATIENT)
Dept: CARDIOLOGY | Facility: CLINIC | Age: 48
End: 2023-12-14
Payer: COMMERCIAL

## 2023-12-14 ENCOUNTER — HOSPITAL ENCOUNTER (OUTPATIENT)
Dept: CARDIOLOGY | Facility: CLINIC | Age: 48
Discharge: HOME | End: 2023-12-14
Payer: COMMERCIAL

## 2023-12-14 VITALS
DIASTOLIC BLOOD PRESSURE: 62 MMHG | WEIGHT: 224.19 LBS | HEIGHT: 68 IN | HEART RATE: 78 BPM | WEIGHT: 221.31 LBS | BODY MASS INDEX: 33.54 KG/M2 | SYSTOLIC BLOOD PRESSURE: 139 MMHG | BODY MASS INDEX: 33.98 KG/M2 | HEIGHT: 68 IN | OXYGEN SATURATION: 99 %

## 2023-12-14 DIAGNOSIS — I45.10 RBBB: ICD-10-CM

## 2023-12-14 DIAGNOSIS — Q24.9 ADULT CONGENITAL HEART DISEASE: ICD-10-CM

## 2023-12-14 DIAGNOSIS — Z87.74 S/P REPAIR OF TETRALOGY OF FALLOT: ICD-10-CM

## 2023-12-14 DIAGNOSIS — I51.7 RIGHT VENTRICULAR ENLARGEMENT: ICD-10-CM

## 2023-12-14 DIAGNOSIS — I37.1 NONRHEUMATIC PULMONARY VALVE INSUFFICIENCY: ICD-10-CM

## 2023-12-14 DIAGNOSIS — Q24.9 CONGENITAL MALFORMATION OF HEART, UNSPECIFIED: Primary | ICD-10-CM

## 2023-12-14 PROBLEM — I27.20 PULMONARY HYPERTENSION: Status: RESOLVED | Noted: 2023-08-10 | Resolved: 2023-12-14

## 2023-12-14 PROBLEM — Z01.810 PREOP CARDIOVASCULAR EXAM: Status: RESOLVED | Noted: 2023-08-29 | Resolved: 2023-12-14

## 2023-12-14 LAB
ASCENDING AORTA: 2.78 CM
AV INDEX (PROSTH): 0.96
AV MEAN GRADIENT: 3 MMHG
AV PEAK GRADIENT: 5 MMHG
AV VALVE AREA BY VELOCITY RATIO: 3.4 CM²
AV VALVE AREA: 3.55 CM²
AV VELOCITY RATIO: 0.92
BSA FOR ECHO PROCEDURE: 2.21 M2
CV ECHO LV RWT: 0.4 CM
DOP CALC AO PEAK VEL: 1.11 M/S
DOP CALC AO VTI: 21.03 CM
DOP CALC LVOT AREA: 3.7 CM2
DOP CALC LVOT DIAMETER: 2.17 CM
DOP CALC LVOT PEAK VEL: 1.02 M/S
DOP CALC LVOT STROKE VOLUME: 74.63 CM3
DOP CALC RVOT PEAK VEL: 0.92 M/S
DOP CALC RVOT VTI: 20.53 CM
DOP CALCLVOT PEAK VEL VTI: 20.19 CM
E WAVE DECELERATION TIME: 214.08 MSEC
E/A RATIO: 1.75
E/E' RATIO: 12 M/S
ECHO LV POSTERIOR WALL: 0.98 CM (ref 0.6–1.1)
FRACTIONAL SHORTENING: 36 % (ref 28–44)
INTERVENTRICULAR SEPTUM: 0.97 CM (ref 0.6–1.1)
LA MAJOR: 6.41 CM
LA MINOR: 6.16 CM
LA WIDTH: 4.11 CM
LEFT ATRIUM SIZE: 4.06 CM
LEFT ATRIUM VOLUME INDEX MOD: 30.2 ML/M2
LEFT ATRIUM VOLUME INDEX: 41.8 ML/M2
LEFT ATRIUM VOLUME MOD: 64.35 CM3
LEFT ATRIUM VOLUME: 89.11 CM3
LEFT INTERNAL DIMENSION IN SYSTOLE: 3.12 CM (ref 2.1–4)
LEFT VENTRICLE DIASTOLIC VOLUME INDEX: 52.08 ML/M2
LEFT VENTRICLE DIASTOLIC VOLUME: 110.92 ML
LEFT VENTRICLE MASS INDEX: 79 G/M2
LEFT VENTRICLE SYSTOLIC VOLUME INDEX: 18 ML/M2
LEFT VENTRICLE SYSTOLIC VOLUME: 38.44 ML
LEFT VENTRICULAR INTERNAL DIMENSION IN DIASTOLE: 4.86 CM (ref 3.5–6)
LEFT VENTRICULAR MASS: 167.86 G
LV LATERAL E/E' RATIO: 9.5 M/S
LV SEPTAL E/E' RATIO: 16.29 M/S
MV A" WAVE DURATION": 7.71 MSEC
MV PEAK A VEL: 0.65 M/S
MV PEAK E VEL: 1.14 M/S
MV STENOSIS PRESSURE HALF TIME: 62.08 MS
MV VALVE AREA P 1/2 METHOD: 3.54 CM2
PULM VEIN S/D RATIO: 0.64
PV MEAN GRADIENT: 2 MMHG
PV PEAK D VEL: 0.42 M/S
PV PEAK GRADIENT: 18 MMHG
PV PEAK S VEL: 0.27 M/S
PV PEAK VELOCITY: 2.11 M/S
RA MAJOR: 5.43 CM
RA WIDTH: 4.91 CM
RIGHT VENTRICULAR END-DIASTOLIC DIMENSION: 6.69 CM
SINUS: 3.47 CM
STJ: 2.8 CM
TDI LATERAL: 0.12 M/S
TDI SEPTAL: 0.07 M/S
TDI: 0.1 M/S
TRICUSPID ANNULAR PLANE SYSTOLIC EXCURSION: 1.87 CM
Z-SCORE OF LEFT VENTRICULAR DIMENSION IN END DIASTOLE: -3.34
Z-SCORE OF LEFT VENTRICULAR DIMENSION IN END SYSTOLE: -2.24

## 2023-12-14 PROCEDURE — 1159F MED LIST DOCD IN RCRD: CPT | Mod: CPTII,S$GLB,, | Performed by: PEDIATRICS

## 2023-12-14 PROCEDURE — 3060F POS MICROALBUMINURIA REV: CPT | Mod: CPTII,S$GLB,, | Performed by: PEDIATRICS

## 2023-12-14 PROCEDURE — 93303 ECHO (CUPID ONLY): ICD-10-PCS | Mod: 26,,, | Performed by: PEDIATRICS

## 2023-12-14 PROCEDURE — 99205 PR OFFICE/OUTPT VISIT, NEW, LEVL V, 60-74 MIN: ICD-10-PCS | Mod: S$GLB,,, | Performed by: PEDIATRICS

## 2023-12-14 PROCEDURE — 93320 ECHO (CUPID ONLY): ICD-10-PCS | Mod: 26,,, | Performed by: PEDIATRICS

## 2023-12-14 PROCEDURE — 3044F PR MOST RECENT HEMOGLOBIN A1C LEVEL <7.0%: ICD-10-PCS | Mod: CPTII,S$GLB,, | Performed by: PEDIATRICS

## 2023-12-14 PROCEDURE — 3008F BODY MASS INDEX DOCD: CPT | Mod: CPTII,S$GLB,, | Performed by: PEDIATRICS

## 2023-12-14 PROCEDURE — 3075F PR MOST RECENT SYSTOLIC BLOOD PRESS GE 130-139MM HG: ICD-10-PCS | Mod: CPTII,S$GLB,, | Performed by: PEDIATRICS

## 2023-12-14 PROCEDURE — 1159F PR MEDICATION LIST DOCUMENTED IN MEDICAL RECORD: ICD-10-PCS | Mod: CPTII,S$GLB,, | Performed by: PEDIATRICS

## 2023-12-14 PROCEDURE — 93325 ECHO (CUPID ONLY): ICD-10-PCS | Mod: 26,,, | Performed by: PEDIATRICS

## 2023-12-14 PROCEDURE — 3008F PR BODY MASS INDEX (BMI) DOCUMENTED: ICD-10-PCS | Mod: CPTII,S$GLB,, | Performed by: PEDIATRICS

## 2023-12-14 PROCEDURE — 3060F PR POS MICROALBUMINURIA RESULT DOCUMENTED/REVIEW: ICD-10-PCS | Mod: CPTII,S$GLB,, | Performed by: PEDIATRICS

## 2023-12-14 PROCEDURE — 93325 DOPPLER ECHO COLOR FLOW MAPG: CPT | Mod: 26,,, | Performed by: PEDIATRICS

## 2023-12-14 PROCEDURE — 3066F NEPHROPATHY DOC TX: CPT | Mod: CPTII,S$GLB,, | Performed by: PEDIATRICS

## 2023-12-14 PROCEDURE — 93010 ELECTROCARDIOGRAM REPORT: CPT | Mod: S$GLB,,, | Performed by: INTERNAL MEDICINE

## 2023-12-14 PROCEDURE — 99999 PR PBB SHADOW E&M-EST. PATIENT-LVL III: ICD-10-PCS | Mod: PBBFAC,,, | Performed by: PEDIATRICS

## 2023-12-14 PROCEDURE — 3078F PR MOST RECENT DIASTOLIC BLOOD PRESSURE < 80 MM HG: ICD-10-PCS | Mod: CPTII,S$GLB,, | Performed by: PEDIATRICS

## 2023-12-14 PROCEDURE — 99205 OFFICE O/P NEW HI 60 MIN: CPT | Mod: S$GLB,,, | Performed by: PEDIATRICS

## 2023-12-14 PROCEDURE — 3044F HG A1C LEVEL LT 7.0%: CPT | Mod: CPTII,S$GLB,, | Performed by: PEDIATRICS

## 2023-12-14 PROCEDURE — 93320 DOPPLER ECHO COMPLETE: CPT | Mod: 26,,, | Performed by: PEDIATRICS

## 2023-12-14 PROCEDURE — 3066F PR DOCUMENTATION OF TREATMENT FOR NEPHROPATHY: ICD-10-PCS | Mod: CPTII,S$GLB,, | Performed by: PEDIATRICS

## 2023-12-14 PROCEDURE — 93005 ELECTROCARDIOGRAM TRACING: CPT | Mod: S$GLB,,, | Performed by: PEDIATRICS

## 2023-12-14 PROCEDURE — 93005 EKG 12-LEAD: ICD-10-PCS | Mod: S$GLB,,, | Performed by: PEDIATRICS

## 2023-12-14 PROCEDURE — 99999 PR PBB SHADOW E&M-EST. PATIENT-LVL III: CPT | Mod: PBBFAC,,, | Performed by: PEDIATRICS

## 2023-12-14 PROCEDURE — 93320 DOPPLER ECHO COMPLETE: CPT

## 2023-12-14 PROCEDURE — 93325 DOPPLER ECHO COLOR FLOW MAPG: CPT

## 2023-12-14 PROCEDURE — 3075F SYST BP GE 130 - 139MM HG: CPT | Mod: CPTII,S$GLB,, | Performed by: PEDIATRICS

## 2023-12-14 PROCEDURE — 93303 ECHO TRANSTHORACIC: CPT | Mod: 26,,, | Performed by: PEDIATRICS

## 2023-12-14 PROCEDURE — 93010 EKG 12-LEAD: ICD-10-PCS | Mod: S$GLB,,, | Performed by: INTERNAL MEDICINE

## 2023-12-14 PROCEDURE — 3078F DIAST BP <80 MM HG: CPT | Mod: CPTII,S$GLB,, | Performed by: PEDIATRICS

## 2023-12-14 NOTE — PROGRESS NOTES
2023    re:Mer Priest  :1975    Venecia Smith MD  [address] [place] [address]    Dr. Michael Bernard [facility]     Mer Priest is a 48 y.o. female seen in my [facility] clinic today for evaluation of congenital heart disease.  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  We will discuss with Dr. Bocuher and the rest of the EP team.  I suspect that we will want to consider some EP testing prior to placement of a valve.    At present, no need for endocarditis prophylaxis.    Discussion:  She has severe pulmonary valve insufficiency as expected after primary repair of tetralogy.  This is associated with severe right ventricular enlargement.  I would expect improved exercise tolerance with pulmonary valve replacement.  Emerging data suggests at decreased risk of life-threatening arrhythmias and improved life expectancy with pulmonary valve replacement as well.  I am hopeful that she will be a candidate for a catheter based self expanding pulmonary valve, and we will get a CT scan to assess for this.  The self expanding valve can cover the right ventricular outflow tract, a common area for ventricular arrhythmias in these patients.  We may want to consider EP testing and potential ablation prior to catheter placement.  I will discuss with the EP team.    History of present illness:  This is my 1st time seeing her in [facility].  The history was provided by the patient and through review of the chart.  She tells me she was diagnosed with tetralogy of Fallot at the age of 1.  She underwent a primary repair at that time through a median  sternotomy.  She denies shortness of breath and chest pain.  No syncope or near-syncope.  She does have brief palpitations and occasional episodes of dizziness.  She is scheduled to see ENT in the near future because of episodes of vertigo.    A paternal aunt  as a baby from congenital heart disease.  No other family history of congenital heart disease or sudden death.    Past Medical History:   Diagnosis Date    Benign paroxysmal positional vertigo 2023    GERD without esophagitis 2023    Heart disease     Hypertension     Syphilis 2020     Past Surgical History:   Procedure Laterality Date    BILATERAL TUBAL LIGATION      TETRALOGY OF FALLOT REPAIR       Family History   Problem Relation Age of Onset    Prostate cancer Father     Ovarian cancer Cousin      Social History     Socioeconomic History    Marital status: Single   Tobacco Use    Smoking status: Former     Current packs/day: 0.00     Types: Cigarettes     Quit date: 2022     Years since quittin.6    Smokeless tobacco: Never   Substance and Sexual Activity    Alcohol use: Not Currently    Drug use: Never    Sexual activity: Not Currently     Current Outpatient Medications on File Prior to Visit   Medication Sig Dispense Refill    amLODIPine (NORVASC) 5 MG tablet Take 1 tablet by mouth every day 90 tablet 3    benzonatate (TESSALON) 200 MG capsule TAKE 1 CAPSULE (ORAL) 3 TIMES PER DAY AS NEEDED FOR COUGH DO NOT CUT, BITE, CRUSH OR CHEW.      cetirizine (ZYRTEC) 10 MG tablet Take 10 mg by mouth daily as needed.      ergocalciferol (ERGOCALCIFEROL) 50,000 unit Cap Take 1 capsule (50,000 Units total) by mouth every 7 days. 12 capsule 1    gabapentin (NEURONTIN) 300 MG capsule Take 1 capsule by mouth 3 (three) times daily as needed.      hydroCHLOROthiazide (HYDRODIURIL) 25 MG tablet Take 1 tablet (25 mg total) by mouth once daily. 90 tablet 1    levocetirizine (XYZAL) 5 MG tablet Take 1 tablet (5 mg total) by mouth every evening.  (Patient taking differently: Take 5 mg by mouth daily as needed.) 30 tablet 11    meclizine (ANTIVERT) 25 mg tablet Take 1 tablet (25 mg total) by mouth 3 (three) times daily as needed for Dizziness. 30 tablet 0    metoprolol succinate (TOPROL-XL) 50 MG 24 hr tablet Take 1 tablet (50 mg total) by mouth 2 (two) times daily. (Patient taking differently: Take 50 mg by mouth once daily.) 180 tablet 3    tirzepatide (MOUNJARO) 2.5 mg/0.5 mL PnIj Inject 2.5 mg into the skin every 7 days. 4 Pen 0    blood sugar diagnostic Strp To check BG 3 times daily, to use with insurance preferred meter (Patient not taking: Reported on 12/14/2023) 100 strip 11    blood-glucose meter kit To check BG 3 times daily, to use with insurance preferred meter (Patient not taking: Reported on 12/14/2023) 1 each 0    lancets Misc To check BG 3 times daily, to use with insurance preferred meter (Patient not taking: Reported on 12/14/2023) 100 each 11    [DISCONTINUED] aspirin (ECOTRIN) 81 MG EC tablet Take 1 tablet by mouth once daily.      [DISCONTINUED] desloratadine (CLARINEX) 5 mg tablet Take 1 tablet (5 mg total) by mouth once daily. for 7 days (Patient not taking: Reported on 9/20/2023) 7 tablet 0    [DISCONTINUED] dexchlorphen-phenylephrine-DM 1-5-10 mg/5 mL Syrp Take 10 mLs by mouth every 6 to 8 hours as needed (cough/congestion). (Patient not taking: Reported on 12/14/2023) 140 mL 0    [DISCONTINUED] fluticasone propionate (FLONASE ALLERGY RELIEF) 50 mcg/actuation nasal spray Flonase Allergy Relief Take 1 Puff(s) (nasal - bilaterally) 2 times per day for 7 days 20221129 spray,suspension 2 times per day nasal 7 days suspended 50 mcg/actuation      [DISCONTINUED] fluticasone propionate (FLONASE) 50 mcg/actuation nasal spray 1 spray (50 mcg total) by Each Nostril route once daily. (Patient not taking: Reported on 12/14/2023) 15.8 mL 0    [DISCONTINUED] pantoprazole (PROTONIX) 40 MG tablet Take 1 tablet (40 mg total) by mouth once daily.  "(Patient not taking: Reported on 12/14/2023) 90 tablet 3    [DISCONTINUED] sulfamethoxazole-trimethoprim 800-160mg (BACTRIM DS) 800-160 mg Tab Take 1 tablet by mouth 2 (two) times daily. (Patient not taking: Reported on 12/14/2023) 20 tablet 0     No current facility-administered medications on file prior to visit.     Review of patient's allergies indicates:   Allergen Reactions    Ace inhibitors Swelling    Lisinopril Other (See Comments)      The review of systems is as noted above. It is otherwise negative for other symptoms related to the general, neurological, psychiatric, endocrine, gastrointestinal, genitourinary, respiratory, dermatologic, musculoskeletal, hematologic, and immunologic systems.    /62 (BP Location: Left arm, Patient Position: Sitting)   Pulse 78   Ht 5' 7.99" (1.727 m)   Wt 100.4 kg (221 lb 5.5 oz)   LMP 10/01/2023 (Exact Date)   SpO2 99%   BMI 33.66 kg/m²     Wt Readings from Last 3 Encounters:   12/14/23 100.4 kg (221 lb 5.5 oz)   12/14/23 101.7 kg (224 lb 3.2 oz)   11/29/23 101.7 kg (224 lb 3.2 oz)     Ht Readings from Last 3 Encounters:   12/14/23 5' 7.99" (1.727 m)   12/14/23 5' 8" (1.727 m)   11/29/23 5' 8" (1.727 m)     Body mass index is 33.66 kg/m².  Facility age limit for growth %mely is 20 years.  Facility age limit for growth %mely is 20 years.  Facility age limit for growth %mely is 20 years.    In general, she is an obese, very healthy-appearing nondysmorphic female in no apparent distress.  The eyes, nares, and oropharynx are clear.  Eyelids and conjunctiva are normal without drainage or erythema.  Pupils equal and round bilaterally.  The head is normocephalic and atraumatic.  The neck is supple without jugular venous distention or thyroid enlargement.  The lungs are clear to auscultation bilaterally.  A well-healed sternotomy incision is noted.  First heart sound is normal, 2nd is fixed and split.  A grade 1/6 systolic ejection murmur is followed by blowing grade " 2/6 diastolic murmur.  A prominent right ventricular impulse is present.  The abdominal exam is benign without hepatosplenomegaly, tenderness, or distention.  Pulses are normal in all 4 extremities with brisk capillary refill and no clubbing, cyanosis, or edema.  No rashes are noted.    I personally reviewed the following tests performed today and my interpretation follows:    EKG:  Normal sinus rhythm   Right bundle branch block   QRS Dur : 174 ms     Results for orders placed during the hospital encounter of 12/14/23    Echo    IMPRESSION:  Single ectopic beats with change in QRS noted during this study.  Echodensity consistent with patch repair of atrial septal defect residual shunt demonstrated.  Qualitative impression of at least moderately dilated right atrium.  Large tricuspid valve annulus with no evidence of stenosis and only trivial insufficiency appreciated..  Qualitative impression of severely dilated right ventricle (appears to form apex of the heart) with preserved systolic function.  Echodensity consistent with patch repair of ventricular septal defect and malalignment of the ventricular septum with no residual shunt demonstrated.  Appears to have transannular right ventricular outflow tract patch and no functional pulmonary valve identified.  Peak velocity across the right ventricular outflow tract and pulmonary artery < 2 m/sec with unrestricted insufficiency.  No significant stenosis is demonstrated in 2D and color Doppler imaging within the pulmonary artery.  Pulmonary artery branches are not well demonstrated in this study.  The left atrial volume index is normal measuring 30 ml/m2.  Qualitative impression of normal mitral valve structure with trivial insufficiency by color Doppler.  Qualitative impression of normal left ventricular size and structure.  Paradoxical septal motion with good movement of the LV free wall, SF = 36% and EF estimated 55 -60% from apical views.  The structure of the  aortic valve is not well demonstrated with no evidence of stenosis and a trivial jet of insufficiency.  Aortic dimensions:  Sinuses of Valsalva  = 35 mm.  ST junction              = 28 mm.  Ascending aorta      = 30  mm.  Normal left aortic arch branching pattern with no evidence of coarctation.  No pericardial effusion.    Holter 7/25/23:    The predominant rhythm is sinus.    Heart rates varied between 54 and 88 BPM with an average of 67 BPM.    Patient Reported Event #1 There were no patient reported events    9 runs of VT, longest 3 beats    356 runs of SVT longest 7 beats    MRI 6/24/22 at [facility]:  Repaired tetralogy of Fallot with dilated right ventricle  mL (normal ), EDV/.15 mL/m2 (normal 57-95). Mildly decreased right ventricular function, RVEF =38%.   Mild left ventricular dilatation, mild to moderately reduced left ventricular function, LVEF =45%.   Small diverticulum of the anterior septum.   Late gadolinium enhancement at the right ventricular outflow tract likely related to patch repair.   Severely dilated right ventricular outflow tract with free pulmonic insufficiency.     Thank you for referring this patient to our clinic.  Please call with any questions.    Sincerely,        Amrik Roberts MD  Pediatric Cardiology  Adult Congenital Heart Disease  Pediatric Heart Failure and Transplantation  [facility]  [address] [place]  [address]  [phone]

## 2023-12-15 ENCOUNTER — OFFICE VISIT (OUTPATIENT)
Dept: URGENT CARE | Facility: CLINIC | Age: 48
End: 2023-12-15
Payer: COMMERCIAL

## 2023-12-15 VITALS
DIASTOLIC BLOOD PRESSURE: 80 MMHG | HEART RATE: 80 BPM | SYSTOLIC BLOOD PRESSURE: 141 MMHG | OXYGEN SATURATION: 97 % | BODY MASS INDEX: 34.69 KG/M2 | WEIGHT: 221 LBS | HEIGHT: 67 IN | RESPIRATION RATE: 18 BRPM | TEMPERATURE: 99 F

## 2023-12-15 DIAGNOSIS — R05.9 COUGH, UNSPECIFIED TYPE: ICD-10-CM

## 2023-12-15 DIAGNOSIS — J02.9 SORE THROAT: Primary | ICD-10-CM

## 2023-12-15 DIAGNOSIS — J11.1 INFLUENZA-LIKE ILLNESS: ICD-10-CM

## 2023-12-15 LAB
CTP QC/QA: YES
MOLECULAR STREP A: NEGATIVE
POC MOLECULAR INFLUENZA A AGN: NEGATIVE
POC MOLECULAR INFLUENZA B AGN: NEGATIVE
SARS-COV-2 AG RESP QL IA.RAPID: NEGATIVE

## 2023-12-15 PROCEDURE — 99214 OFFICE O/P EST MOD 30 MIN: CPT | Mod: S$GLB,,, | Performed by: FAMILY MEDICINE

## 2023-12-15 PROCEDURE — 99214 PR OFFICE/OUTPT VISIT, EST, LEVL IV, 30-39 MIN: ICD-10-PCS | Mod: S$GLB,,, | Performed by: FAMILY MEDICINE

## 2023-12-15 PROCEDURE — 87811 SARS CORONAVIRUS 2 ANTIGEN POCT, MANUAL READ: ICD-10-PCS | Mod: QW,S$GLB,, | Performed by: FAMILY MEDICINE

## 2023-12-15 PROCEDURE — 87651 POCT STREP A MOLECULAR: ICD-10-PCS | Mod: QW,S$GLB,, | Performed by: FAMILY MEDICINE

## 2023-12-15 PROCEDURE — 87502 INFLUENZA DNA AMP PROBE: CPT | Mod: QW,S$GLB,, | Performed by: FAMILY MEDICINE

## 2023-12-15 PROCEDURE — 87502 POCT INFLUENZA A/B MOLECULAR: ICD-10-PCS | Mod: QW,S$GLB,, | Performed by: FAMILY MEDICINE

## 2023-12-15 PROCEDURE — 87811 SARS-COV-2 COVID19 W/OPTIC: CPT | Mod: QW,S$GLB,, | Performed by: FAMILY MEDICINE

## 2023-12-15 PROCEDURE — 87651 STREP A DNA AMP PROBE: CPT | Mod: QW,S$GLB,, | Performed by: FAMILY MEDICINE

## 2023-12-15 RX ORDER — OSELTAMIVIR PHOSPHATE 75 MG/1
75 CAPSULE ORAL 2 TIMES DAILY
Qty: 10 CAPSULE | Refills: 0 | Status: SHIPPED | OUTPATIENT
Start: 2023-12-15 | End: 2023-12-20

## 2023-12-15 NOTE — PATIENT INSTRUCTIONS
Thank you for allowing our team to take care of you today.  Your diagnosis is the Flu-like illness/Viral illness. Covid, Flu, Strep negative today.   Adding tamiflu antiviral for five days.  Supportive measures.  You are contagious so be careful around those around you.   If any symptoms continue or worsen, get an immediate medical provider/ER evaluation.  Followup here as needed.     SYMPTOM MEDICATIONS IF NEEDED AND APPROPRIATE:    You may gargle with warm salt water 4 times a day and as needed.     Drink plenty of fluids. You can also drink warm tea with honey.     Make sure you are getting rest.    You can use cough drops or lozenges to soothe your sore throat.     You can vitamin C, D, zinc to help boost your immune system.    You can use your prescription cough medication if needed.    Flonase nasal spray can be used for nasal congestion. Two sprays each nostril daily.     Honey is a natural cough suppressant that can be used.    Make sure to stay well hydrated.

## 2023-12-15 NOTE — LETTER
December 15, 2023    Mer Priest  [address]  [place] [address]             [facility]  Urgent Care  [address]  [place] [address]  Phone: [phone]   December 15, 2023     Patient: Mer Priest   YOB: 1975   Date of Visit: 12/15/2023       To Whom it May Concern:    Mer Priest was seen in my clinic on 12/15/2023. She may return to work on 12/19/2023 .    Please excuse her from any classes or work missed.    If you have any questions or concerns, please don't hesitate to call.    Sincerely,         Deepa Hayes MD

## 2023-12-17 NOTE — PROGRESS NOTES
"Subjective:      Patient ID: Mer Priest is a 48 y.o. female.    Vitals:  height is 5' 7" (1.702 m) and weight is 100.2 kg (221 lb). Her oral temperature is 99.3 °F (37.4 °C). Her blood pressure is 141/80 (abnormal) and her pulse is 80. Her respiration is 18 and oxygen saturation is 97%.     Chief Complaint: No chief complaint on file.    47 yo female started yesterday with left ear pain. Then went to sore throat and headache. Throat dry. Then body aches, nasal congestion. Unknown sick contacts. No meds.         Constitution: Positive for activity change, fatigue and fever. Negative for chills.   HENT:  Positive for ear pain, congestion and sore throat. Negative for ear discharge.    Neck: neck negative.   Cardiovascular: Negative.    Respiratory: Negative.     Gastrointestinal: Negative.    Genitourinary: Negative.    Musculoskeletal:  Positive for muscle ache.   Skin: Negative.    Neurological:  Positive for headaches.   Psychiatric/Behavioral: Negative.        Objective:     Physical Exam   Constitutional: She is oriented to person, place, and time. No distress.   HENT:   Head: Normocephalic.   Ears:   Right Ear: Tympanic membrane, external ear and ear canal normal.   Left Ear: Tympanic membrane, external ear and ear canal normal.   Nose: Congestion present. No sinus tenderness. No epistaxis. Right sinus exhibits no maxillary sinus tenderness. Left sinus exhibits no maxillary sinus tenderness.      Comments: Clear congestion  Mouth/Throat: Mucous membranes are moist. No oropharyngeal exudate or posterior oropharyngeal erythema.   Eyes: Conjunctivae are normal. Pupils are equal, round, and reactive to light. Extraocular movement intact   Neck: Neck supple.   Cardiovascular: Normal rate, regular rhythm, normal heart sounds and normal pulses.   Pulmonary/Chest: Effort normal and breath sounds normal.   Abdominal: Normal appearance. She exhibits no distension. Soft. There is no abdominal tenderness. There is no " left CVA tenderness and no right CVA tenderness.   Musculoskeletal: Normal range of motion.         General: Normal range of motion.      Right lower leg: No edema.      Left lower leg: No edema.   Neurological: no focal deficit. She is alert and oriented to person, place, and time.   Skin: Skin is warm and no rash.         Comments: Normal turgor   Psychiatric: Her behavior is normal. Mood, judgment and thought content normal.   Nursing note and vitals reviewed.      Assessment:     1. Sore throat    2. Influenza-like illness    3. Cough, unspecified type        Plan:       Sore throat  -     POCT Influenza A/B MOLECULAR  -     POCT Strep A, Molecular  -     SARS Coronavirus 2 Antigen, POCT Manual Read    Influenza-like illness    Cough, unspecified type    Other orders  -     oseltamivir (TAMIFLU) 75 MG capsule; Take 1 capsule (75 mg total) by mouth 2 (two) times daily. for 5 days  Dispense: 10 capsule; Refill: 0      Results for orders placed or performed in visit on 12/15/23   POCT Influenza A/B MOLECULAR   Result Value Ref Range    POC Molecular Influenza A Ag Negative Negative, Not Reported    POC Molecular Influenza B Ag Negative Negative, Not Reported     Acceptable Yes    POCT Strep A, Molecular   Result Value Ref Range    Molecular Strep A, POC Negative Negative     Acceptable Yes    SARS Coronavirus 2 Antigen, POCT Manual Read   Result Value Ref Range    SARS Coronavirus 2 Antigen Negative Negative     Acceptable Yes      SUMMARY:  Flulike illness. Still early with symptoms. Testing currently negative but still with treat for flu. Contact precautions. Supportive measures. Antiviral. Handout on flu given as well. Immediate medical provider evaluation if worsens or new symptoms.     Patient Instructions   Thank you for allowing our team to take care of you today.  Your diagnosis is the Flu-like illness/Viral illness. Covid, Flu, Strep negative today.   Adding  tamiflu antiviral for five days.  Supportive measures.  You are contagious so be careful around those around you.   If any symptoms continue or worsen, get an immediate medical provider/ER evaluation.  Followup here as needed.     SYMPTOM MEDICATIONS IF NEEDED AND APPROPRIATE:    You may gargle with warm salt water 4 times a day and as needed.     Drink plenty of fluids. You can also drink warm tea with honey.     Make sure you are getting rest.    You can use cough drops or lozenges to soothe your sore throat.     You can vitamin C, D, zinc to help boost your immune system.    You can use your prescription cough medication if needed.    Flonase nasal spray can be used for nasal congestion. Two sprays each nostril daily.     Honey is a natural cough suppressant that can be used.    Make sure to stay well hydrated.

## 2023-12-18 ENCOUNTER — OFFICE VISIT (OUTPATIENT)
Dept: URGENT CARE | Facility: CLINIC | Age: 48
End: 2023-12-18
Payer: COMMERCIAL

## 2023-12-18 VITALS
SYSTOLIC BLOOD PRESSURE: 126 MMHG | HEART RATE: 75 BPM | RESPIRATION RATE: 21 BRPM | OXYGEN SATURATION: 94 % | TEMPERATURE: 99 F | WEIGHT: 219.38 LBS | HEIGHT: 67 IN | BODY MASS INDEX: 34.43 KG/M2 | DIASTOLIC BLOOD PRESSURE: 61 MMHG

## 2023-12-18 DIAGNOSIS — R09.81 NASAL CONGESTION: ICD-10-CM

## 2023-12-18 DIAGNOSIS — J01.10 ACUTE FRONTAL SINUSITIS, RECURRENCE NOT SPECIFIED: Primary | ICD-10-CM

## 2023-12-18 DIAGNOSIS — R52 GENERALIZED BODY ACHES: ICD-10-CM

## 2023-12-18 DIAGNOSIS — R68.83 CHILLS (WITHOUT FEVER): ICD-10-CM

## 2023-12-18 DIAGNOSIS — H92.09 EAR PAIN, REFERRED, UNSPECIFIED LATERALITY: ICD-10-CM

## 2023-12-18 DIAGNOSIS — J02.9 ACUTE SORE THROAT: ICD-10-CM

## 2023-12-18 DIAGNOSIS — R05.9 COUGH IN ADULT PATIENT: ICD-10-CM

## 2023-12-18 PROCEDURE — 87811 SARS CORONAVIRUS 2 ANTIGEN POCT, MANUAL READ: ICD-10-PCS | Mod: QW,S$GLB,, | Performed by: NURSE PRACTITIONER

## 2023-12-18 PROCEDURE — 99213 PR OFFICE/OUTPT VISIT, EST, LEVL III, 20-29 MIN: ICD-10-PCS | Mod: 25,S$GLB,, | Performed by: NURSE PRACTITIONER

## 2023-12-18 PROCEDURE — 96372 PR INJECTION,THERAP/PROPH/DIAG2ST, IM OR SUBCUT: ICD-10-PCS | Mod: S$GLB,,, | Performed by: FAMILY MEDICINE

## 2023-12-18 PROCEDURE — 96372 THER/PROPH/DIAG INJ SC/IM: CPT | Mod: S$GLB,,, | Performed by: FAMILY MEDICINE

## 2023-12-18 PROCEDURE — 87502 INFLUENZA DNA AMP PROBE: CPT | Mod: QW,S$GLB,, | Performed by: NURSE PRACTITIONER

## 2023-12-18 PROCEDURE — 87502 POCT INFLUENZA A/B MOLECULAR: ICD-10-PCS | Mod: QW,S$GLB,, | Performed by: NURSE PRACTITIONER

## 2023-12-18 PROCEDURE — 87811 SARS-COV-2 COVID19 W/OPTIC: CPT | Mod: QW,S$GLB,, | Performed by: NURSE PRACTITIONER

## 2023-12-18 PROCEDURE — 87632 RESP VIRUS 6-11 TARGETS: CPT | Performed by: NURSE PRACTITIONER

## 2023-12-18 PROCEDURE — 99213 OFFICE O/P EST LOW 20 MIN: CPT | Mod: 25,S$GLB,, | Performed by: NURSE PRACTITIONER

## 2023-12-18 RX ORDER — FLUCONAZOLE 150 MG/1
150 TABLET ORAL ONCE
Qty: 2 TABLET | Refills: 0 | Status: SHIPPED | OUTPATIENT
Start: 2023-12-18 | End: 2023-12-18

## 2023-12-18 RX ORDER — DEXAMETHASONE SODIUM PHOSPHATE 100 MG/10ML
10 INJECTION INTRAMUSCULAR; INTRAVENOUS ONCE
Status: COMPLETED | OUTPATIENT
Start: 2023-12-18 | End: 2023-12-18

## 2023-12-18 RX ORDER — AMOXICILLIN AND CLAVULANATE POTASSIUM 875; 125 MG/1; MG/1
1 TABLET, FILM COATED ORAL 2 TIMES DAILY
Qty: 20 TABLET | Refills: 0 | Status: SHIPPED | OUTPATIENT
Start: 2023-12-18 | End: 2023-12-28

## 2023-12-18 RX ORDER — FLUTICASONE PROPIONATE 50 MCG
2 SPRAY, SUSPENSION (ML) NASAL DAILY
Qty: 9.9 ML | Refills: 0 | Status: CANCELLED | OUTPATIENT
Start: 2023-12-18 | End: 2024-01-01

## 2023-12-18 RX ORDER — CETIRIZINE HYDROCHLORIDE 10 MG/1
10 TABLET ORAL DAILY PRN
Qty: 30 TABLET | Refills: 0 | Status: SHIPPED | OUTPATIENT
Start: 2023-12-18

## 2023-12-18 RX ORDER — PROMETHAZINE HYDROCHLORIDE AND DEXTROMETHORPHAN HYDROBROMIDE 6.25; 15 MG/5ML; MG/5ML
5 SYRUP ORAL NIGHTLY PRN
Qty: 118 ML | Refills: 0 | Status: SHIPPED | OUTPATIENT
Start: 2023-12-18 | End: 2024-01-11

## 2023-12-18 RX ORDER — DEXAMETHASONE SODIUM PHOSPHATE 100 MG/10ML
10 INJECTION INTRAMUSCULAR; INTRAVENOUS ONCE
Status: DISCONTINUED | OUTPATIENT
Start: 2023-12-18 | End: 2023-12-18

## 2023-12-18 RX ADMIN — DEXAMETHASONE SODIUM PHOSPHATE 10 MG: 100 INJECTION INTRAMUSCULAR; INTRAVENOUS at 05:12

## 2023-12-18 NOTE — PROGRESS NOTES
"Subjective:      Patient ID: Mer Priest is a 48 y.o. female.    Vitals:  height is 5' 7" (1.702 m) and weight is 99.5 kg (219 lb 5.7 oz). Her tympanic temperature is 99 °F (37.2 °C). Her blood pressure is 126/61 and her pulse is 75. Her respiration is 21 (abnormal) and oxygen saturation is 94% (abnormal).     Chief Complaint: Cough    Pt presented here today with cough, sore throat and wheezing x4days. Pt seen in  12/15/23. Pt having laying comfortably in reclined position without feeling throat close. Trouble sleeping as well. Pt states she has been taking Tessalon, Tamiflu and tea with no relief.     Cough  This is a new problem. The current episode started in the past 7 days. The problem has been unchanged. The problem occurs constantly. The cough is Productive of brown sputum. Associated symptoms include chills, ear pain (nightly), myalgias, nasal congestion, postnasal drip and a sore throat. Pertinent negatives include no chest pain, ear congestion, fever, headaches, heartburn, hemoptysis, rash, rhinorrhea, shortness of breath, sweats, weight loss or wheezing. Nothing aggravates the symptoms. She has tried prescription cough suppressant and rest for the symptoms. The treatment provided no relief. There is no history of asthma, bronchiectasis, bronchitis, COPD, emphysema, environmental allergies or pneumonia.       Constitution: Positive for chills. Negative for fever.   HENT:  Positive for ear pain (nightly), postnasal drip and sore throat.    Cardiovascular:  Negative for chest pain.   Respiratory:  Positive for cough. Negative for bloody sputum, shortness of breath and wheezing.    Gastrointestinal:  Negative for heartburn.   Musculoskeletal:  Positive for muscle ache.   Skin:  Negative for rash.   Allergic/Immunologic: Negative for environmental allergies.   Neurological:  Negative for headaches.      Objective:     Physical Exam   Constitutional: She is oriented to person, place, and time. She appears " well-developed. She is cooperative.  Non-toxic appearance. She does not appear ill. No distress.   HENT:   Head: Normocephalic and atraumatic.   Ears:   Right Ear: Hearing, external ear and ear canal normal. There is tenderness. Tympanic membrane is erythematous. A middle ear effusion is present.   Left Ear: Hearing, external ear and ear canal normal. There is tenderness. Tympanic membrane is erythematous. A middle ear effusion is present.   Nose: Mucosal edema, rhinorrhea, purulent discharge and sinus tenderness present. No nasal deformity. No epistaxis. Right sinus exhibits maxillary sinus tenderness and frontal sinus tenderness. Left sinus exhibits maxillary sinus tenderness and frontal sinus tenderness.   Mouth/Throat: Uvula is midline and mucous membranes are normal. No trismus in the jaw. Normal dentition. No uvula swelling. Posterior oropharyngeal edema and posterior oropharyngeal erythema present. No oropharyngeal exudate.   Eyes: Conjunctivae and lids are normal. No scleral icterus.   Neck: Trachea normal and phonation normal. Neck supple. No edema present. No erythema present. No neck rigidity present.   Cardiovascular: Normal rate, regular rhythm, normal heart sounds and normal pulses.   Pulmonary/Chest: Effort normal and breath sounds normal. No respiratory distress. She has no decreased breath sounds. She has no rhonchi.   Abdominal: Normal appearance.   Musculoskeletal: Normal range of motion.         General: No deformity. Normal range of motion.   Neurological: She is alert and oriented to person, place, and time. She exhibits normal muscle tone. Coordination normal.   Skin: Skin is warm, dry, intact, not diaphoretic and not pale.   Psychiatric: Her speech is normal and behavior is normal. Judgment and thought content normal.   Nursing note and vitals reviewed.      Assessment:     1. Acute frontal sinusitis, recurrence not specified    2. Chills (without fever)    3. Ear pain, referred, unspecified  laterality    4. Nasal congestion    5. Acute sore throat    6. Cough in adult patient    7. Generalized body aches      Results for orders placed or performed in visit on 12/18/23   POCT Influenza A/B MOLECULAR   Result Value Ref Range    POC Molecular Influenza A Ag Negative Negative, Not Reported    POC Molecular Influenza B Ag Negative Negative, Not Reported     Acceptable Yes    SARS Coronavirus 2 Antigen, POCT Manual Read   Result Value Ref Range    SARS Coronavirus 2 Antigen Negative Negative     Acceptable Yes        Plan:       Acute frontal sinusitis, recurrence not specified  -     promethazine-dextromethorphan (PROMETHAZINE-DM) 6.25-15 mg/5 mL Syrp; Take 5 mLs by mouth nightly as needed.  Dispense: 118 mL; Refill: 0  -     amoxicillin-clavulanate 875-125mg (AUGMENTIN) 875-125 mg per tablet; Take 1 tablet by mouth 2 (two) times daily. for 10 days  Dispense: 20 tablet; Refill: 0  -     fluconazole (DIFLUCAN) 150 MG Tab; Take 1 tablet (150 mg total) by mouth once. May repeat after 72 hours if symptoms persist for 1 dose  Dispense: 2 tablet; Refill: 0  -     Discontinue: dexAMETHasone injection 10 mg  -     cetirizine (ZYRTEC) 10 MG tablet; Take 1 tablet (10 mg total) by mouth daily as needed for Rhinitis.  Dispense: 30 tablet; Refill: 0  -     Respiratory Viral Panel by PCR (Sources other than NP Swab) [facility]; Nasal Swab    Chills (without fever)  -     POCT Influenza A/B MOLECULAR  -     SARS Coronavirus 2 Antigen, POCT Manual Read    Ear pain, referred, unspecified laterality  -     POCT Influenza A/B MOLECULAR  -     SARS Coronavirus 2 Antigen, POCT Manual Read  -     Discontinue: dexAMETHasone injection 10 mg    Nasal congestion  -     POCT Influenza A/B MOLECULAR  -     SARS Coronavirus 2 Antigen, POCT Manual Read  -     Discontinue: dexAMETHasone injection 10 mg  -     cetirizine (ZYRTEC) 10 MG tablet; Take 1 tablet (10 mg total) by mouth daily as needed for Rhinitis.   Dispense: 30 tablet; Refill: 0    Acute sore throat  -     POCT Influenza A/B MOLECULAR  -     SARS Coronavirus 2 Antigen, POCT Manual Read    Cough in adult patient  -     POCT Influenza A/B MOLECULAR  -     SARS Coronavirus 2 Antigen, POCT Manual Read  -     promethazine-dextromethorphan (PROMETHAZINE-DM) 6.25-15 mg/5 mL Syrp; Take 5 mLs by mouth nightly as needed.  Dispense: 118 mL; Refill: 0    Generalized body aches  -     POCT Influenza A/B MOLECULAR  -     SARS Coronavirus 2 Antigen, POCT Manual Read    Continue taking flonase at home daily.  Do not take meclinize while on cough medicine.    Increase fluids.  Get plenty of rest.   Normal saline nasal wash to irrigate sinuses and for congestion/runny nose.  Cool mist humidifier/vaporizer.  Practice good handwashing.  Mucinex for cough and chest congestion.  Tylenol or Ibuprofen for fever, headache and body aches.  Warm salt water gargles for throat comfort.  Chloraseptic spray or lozenges for throat comfort.  See PCP or go to ER if symptoms worsen or fail to improve with treatment.

## 2023-12-18 NOTE — LETTER
December 18, 2023      [facility]  [address]  [facility] [address]  Phone: [phone]       Patient: Mer Priest   YOB: 1975  Date of Visit: 12/18/2023    To Whom It May Concern:    Shana Priest  was at [facility] on 12/18/2023. The patient may return to work/school on 12/21/2023 or when fever free for 24 hours with no restrictions. If you have any questions or concerns, or if I can be of further assistance, please do not hesitate to contact me.    Sincerely,          Carol Martinez NP

## 2023-12-21 LAB
ENTEROVIRUS/RHINOVIRUS: POSITIVE
HUMAN BOCAVIRUS: NOT DETECTED
HUMAN CORONAVIRUS, COMMON COLD VIRUS: NOT DETECTED
INFLUENZA A - H1N1-09: NOT DETECTED
PARAINFLUENZA: NOT DETECTED
RVP - ADENOVIRUS: NOT DETECTED
RVP - HUMAN METAPNEUMOVIRUS (HMPV): NOT DETECTED
RVP - INFLUENZA A: NOT DETECTED
RVP - INFLUENZA B: NOT DETECTED
RVP - RESPIRATORY SYNCTIAL VIRUS (RSV) A: NOT DETECTED
RVP - RESPIRATORY VIRAL PANEL, SOURCE: ABNORMAL

## 2023-12-27 ENCOUNTER — HOSPITAL ENCOUNTER (OUTPATIENT)
Dept: RADIOLOGY | Facility: HOSPITAL | Age: 48
Discharge: HOME | End: 2023-12-27
Attending: PEDIATRICS
Payer: COMMERCIAL

## 2023-12-27 ENCOUNTER — HOSPITAL ENCOUNTER (OUTPATIENT)
Dept: SLEEP MEDICINE | Facility: HOSPITAL | Age: 48
Discharge: HOME | End: 2023-12-27
Attending: INTERNAL MEDICINE
Payer: COMMERCIAL

## 2023-12-27 DIAGNOSIS — Q24.9 CONGENITAL MALFORMATION OF HEART, UNSPECIFIED: ICD-10-CM

## 2023-12-27 DIAGNOSIS — I27.20 PULMONARY HYPERTENSION: ICD-10-CM

## 2023-12-27 DIAGNOSIS — G47.30 SLEEP APNEA, UNSPECIFIED TYPE: ICD-10-CM

## 2023-12-27 DIAGNOSIS — G47.33 OSA (OBSTRUCTIVE SLEEP APNEA): Primary | ICD-10-CM

## 2023-12-27 PROCEDURE — 75573 CT HRT C+ STRUX CGEN HRT DS: CPT | Mod: TC

## 2023-12-27 PROCEDURE — 95800 PR SLEEP STUDY, UNATTENDED, RECORD HEART RATE/O2 SAT/RESP ANAL/SLEEP TIME: ICD-10-PCS | Mod: 26,,, | Performed by: INTERNAL MEDICINE

## 2023-12-27 PROCEDURE — 95806 SLEEP STUDY UNATT&RESP EFFT: CPT | Performed by: INTERNAL MEDICINE

## 2023-12-27 PROCEDURE — 95800 SLP STDY UNATTENDED: CPT | Mod: 26,,, | Performed by: INTERNAL MEDICINE

## 2023-12-27 PROCEDURE — 75573 CT CARDIAC WITH CONTRAST FOR CONGENITAL: ICD-10-PCS | Mod: 26,,, | Performed by: RADIOLOGY

## 2023-12-27 PROCEDURE — 75573 CT HRT C+ STRUX CGEN HRT DS: CPT | Mod: 26,,, | Performed by: RADIOLOGY

## 2023-12-27 PROCEDURE — 25500020 PHARM REV CODE 255: Performed by: PEDIATRICS

## 2023-12-27 RX ADMIN — IOHEXOL 100 ML: 350 INJECTION, SOLUTION INTRAVENOUS at 12:12

## 2023-12-28 NOTE — PROCEDURES
"PHYSICIAN INTERPRETATION AND COMMENTS: Findings are consistent with severe obstructive sleep apnea (DARLING). Please  refer to sleep disorders for prompt management  CLINICAL HISTORY: 48 year old female presented with: 16.75 inch neck, BMI of 32.5, an [place] sleepiness score of 6,  history of hypertension, heart disease, diabetes, depression and symptoms of nocturnal snoring, waking up choking and  witnessed apneas. Based on the clinical history, the patient has a high pre-test probability of having Severe DARLING.  SLEEP STUDY FINDINGS: Patient underwent a 1 night Home Sleep Test and by behavioral criteria, slept for approximately  6.65 hours , with a sleep efficiency of 95%. Severe sleep disordered breathing (AHI=46) is noted based on a 4% hypopnea  desaturation criteria. The apneas/hypopneas are accompanied by severe oxygen desaturation (percent time below 90%  SpO2: 31%, Min SpO2: 72.6%). The average desaturation across all sleep disordered breathing events is 5.7%. Snoring occurs  for 25.4% (30 dB) of the study, 8.6% is very loud. The mean pulse rate is 72.3 BPM.  TREATMENT CONSIDERATIONS: Valid recording time was less than 2 hours, and the severity of sleep-disordered breathing  as measured by the AHI may be more or less severe on a longer study. Because of this the usual treatment considerations  may not apply. If clinically appropriate a repeat study is suggested.      Dear Des King MD  [id number] [facility]  [place] [id number]/Venecia Smith MD         The sleep study that you ordered is complete.  You have ordered sleep LAB services to perform the sleep study for Mer Priest .      Please find Sleep Study result in  the "Media tab" of Chart Review menu.        You can look  for the report in the  Media by the document type "Sleep Study Documents". Alphabetizing  "Document type" column helps to find the SLEEP STUDY report  Faster.       As the ordering provider, you are responsible for reviewing " "the results and implementing a treatment plan with your patient.    If you need a Sleep Medicine provider to explain the sleep study findings and arrange treatment for the patient, please refer patient for consultation to our Sleep Clinic via [facility] with Ambulatory Consult Sleep.     To do that please place an order for an  "Ambulatory Consult Sleep" -  order , it will go to our clinic work queue for our staff  to contact the patient for an appointment.      For any questions, please contact our sleep lab  staff at [phone] to talk to clinical staff          Jakob Garner MD   "

## 2024-01-02 ENCOUNTER — OFFICE VISIT (OUTPATIENT)
Dept: PULMONOLOGY | Facility: CLINIC | Age: 49
End: 2024-01-02
Payer: COMMERCIAL

## 2024-01-02 VITALS
HEIGHT: 67 IN | BODY MASS INDEX: 34.33 KG/M2 | HEART RATE: 62 BPM | WEIGHT: 218.69 LBS | DIASTOLIC BLOOD PRESSURE: 80 MMHG | SYSTOLIC BLOOD PRESSURE: 120 MMHG | OXYGEN SATURATION: 95 % | RESPIRATION RATE: 18 BRPM

## 2024-01-02 DIAGNOSIS — G47.33 OSA (OBSTRUCTIVE SLEEP APNEA): Primary | ICD-10-CM

## 2024-01-02 PROCEDURE — 1160F RVW MEDS BY RX/DR IN RCRD: CPT | Mod: CPTII,S$GLB,, | Performed by: HOSPITALIST

## 2024-01-02 PROCEDURE — 99999 PR PBB SHADOW E&M-EST. PATIENT-LVL IV: CPT | Mod: PBBFAC,,, | Performed by: HOSPITALIST

## 2024-01-02 PROCEDURE — 3074F SYST BP LT 130 MM HG: CPT | Mod: CPTII,S$GLB,, | Performed by: HOSPITALIST

## 2024-01-02 PROCEDURE — 3079F DIAST BP 80-89 MM HG: CPT | Mod: CPTII,S$GLB,, | Performed by: HOSPITALIST

## 2024-01-02 PROCEDURE — 99213 OFFICE O/P EST LOW 20 MIN: CPT | Mod: S$GLB,,, | Performed by: HOSPITALIST

## 2024-01-02 PROCEDURE — 1159F MED LIST DOCD IN RCRD: CPT | Mod: CPTII,S$GLB,, | Performed by: HOSPITALIST

## 2024-01-02 PROCEDURE — 3008F BODY MASS INDEX DOCD: CPT | Mod: CPTII,S$GLB,, | Performed by: HOSPITALIST

## 2024-01-02 NOTE — ASSESSMENT & PLAN NOTE
- HST 12/27/23- AHI 46, min SpO2 72.6%, with valid recording time <2 hours  - reviewed sleep study- pt does report being sick at the time of study, likely contributing to decreased valid recording time  - Titration study ordered

## 2024-01-02 NOTE — PROGRESS NOTES
"  Subjective:      Patient ID: Mer Priest is a 48 y.o. female.    Chief Complaint: Snoring, daytime fatigue    HPI 2024:    48 year old female with history of repaired tetrology of allot, severe pulmonary valve insufficiency, severe RV enlargement who presents to Pulmonary clinic for follow up HST. She was previously seen 2023 by Dr. Garner- at that visit she reported snoring, difficulty falling asleep. Linneus today is 7.     Pertinent Work Up:  HST 23- AHI 46, min SpO2 72.6%, with valid recording time <2 hours  ECHO 2023- EF 55-60%, Severe dilation RV, ePASP 44mmHg    Review of Systems   Respiratory:  Positive for snoring and somnolence.      Objective:     Physical Exam   Constitutional: She is oriented to person, place, and time. She appears well-developed and well-nourished. She is obese.   Cardiovascular:   Diastolic murmur, regular rate and rhythm     Pulmonary/Chest: Normal expansion, effort normal and breath sounds normal.   Musculoskeletal:         General: No edema.   Neurological: She is alert and oriented to person, place, and time.   Skin: Skin is warm and dry.   Psychiatric: She has a normal mood and affect.     Personal Diagnostic Review  As Above      2023     2:58 PM 12/15/2023     9:08 AM 2023     2:25 PM 2023     2:18 PM 2023    11:58 AM 2023    11:34 AM 2023    12:44 PM   Pulmonary Function Tests   SpO2 94 % 97 % 99 %  95 % 98 % 97 %   Height 5' 7" (1.702 m) 5' 7" (1.702 m) 5' 7.99" (1.727 m) 5' 8" (1.727 m) 5' 8" (1.727 m)     Weight 99.5 kg (219 lb 5.7 oz) 100.2 kg (221 lb) 100.4 kg (221 lb 5.5 oz) 101.7 kg (224 lb 3.2 oz) 101.7 kg (224 lb 3.2 oz)     BMI (Calculated) 34.3 34.6 33.7 34.1 34.1          Assessment:     No diagnosis found.     Outpatient Encounter Medications as of 2024   Medication Sig Dispense Refill    amLODIPine (NORVASC) 5 MG tablet Take 1 tablet by mouth every day 90 tablet 3    [] amoxicillin-clavulanate " 875-125mg (AUGMENTIN) 875-125 mg per tablet Take 1 tablet by mouth 2 (two) times daily. for 10 days 20 tablet 0    benzonatate (TESSALON) 200 MG capsule TAKE 1 CAPSULE (ORAL) 3 TIMES PER DAY AS NEEDED FOR COUGH DO NOT CUT, BITE, CRUSH OR CHEW.      blood sugar diagnostic Strp To check BG 3 times daily, to use with insurance preferred meter (Patient not taking: Reported on 12/14/2023) 100 strip 11    blood-glucose meter kit To check BG 3 times daily, to use with insurance preferred meter (Patient not taking: Reported on 12/14/2023) 1 each 0    cetirizine (ZYRTEC) 10 MG tablet Take 1 tablet (10 mg total) by mouth daily as needed for Rhinitis. 30 tablet 0    ergocalciferol (ERGOCALCIFEROL) 50,000 unit Cap Take 1 capsule (50,000 Units total) by mouth every 7 days. 12 capsule 1    gabapentin (NEURONTIN) 300 MG capsule Take 1 capsule by mouth 3 (three) times daily as needed.      hydroCHLOROthiazide (HYDRODIURIL) 25 MG tablet Take 1 tablet (25 mg total) by mouth once daily. 90 tablet 1    lancets Misc To check BG 3 times daily, to use with insurance preferred meter (Patient not taking: Reported on 12/14/2023) 100 each 11    levocetirizine (XYZAL) 5 MG tablet Take 1 tablet (5 mg total) by mouth every evening. (Patient taking differently: Take 5 mg by mouth daily as needed.) 30 tablet 11    meclizine (ANTIVERT) 25 mg tablet Take 1 tablet (25 mg total) by mouth 3 (three) times daily as needed for Dizziness. 30 tablet 0    metoprolol succinate (TOPROL-XL) 50 MG 24 hr tablet Take 1 tablet (50 mg total) by mouth 2 (two) times daily. (Patient taking differently: Take 50 mg by mouth once daily.) 180 tablet 3    promethazine-dextromethorphan (PROMETHAZINE-DM) 6.25-15 mg/5 mL Syrp Take 5 mLs by mouth nightly as needed. 118 mL 0    tirzepatide (MOUNJARO) 2.5 mg/0.5 mL PnIj Inject 2.5 mg into the skin every 7 days. 4 Pen 0     No facility-administered encounter medications on file as of 1/2/2024.     No orders of the defined types  were placed in this encounter.        Plan:     Problem List Items Addressed This Visit          Other    DARLING (obstructive sleep apnea) - Primary     - HST 12/27/23- AHI 46, min SpO2 72.6%, with valid recording time <2 hours  - reviewed sleep study- pt does report being sick at the time of study, likely contributing to decreased valid recording time  - Titration study ordered           Relevant Orders    BiPAP/CPAP Titration ((Must have dx of DARLING from previous sleep study)         Plan discussed with pt and she expressed understanding, all questions answered. Titration study ordered, will schedule follow up on receipt.

## 2024-01-10 ENCOUNTER — LAB VISIT (OUTPATIENT)
Dept: LAB | Facility: HOSPITAL | Age: 49
End: 2024-01-10
Attending: NURSE PRACTITIONER
Payer: COMMERCIAL

## 2024-01-10 DIAGNOSIS — D50.0 IRON DEFICIENCY ANEMIA DUE TO CHRONIC BLOOD LOSS: ICD-10-CM

## 2024-01-10 LAB
BASOPHILS # BLD AUTO: 0.04 K/UL (ref 0–0.2)
BASOPHILS NFR BLD: 0.5 % (ref 0–1.9)
DIFFERENTIAL METHOD BLD: ABNORMAL
EOSINOPHIL # BLD AUTO: 0.3 K/UL (ref 0–0.5)
EOSINOPHIL NFR BLD: 2.8 % (ref 0–8)
ERYTHROCYTE [DISTWIDTH] IN BLOOD BY AUTOMATED COUNT: 18.6 % (ref 11.5–14.5)
FERRITIN SERPL-MCNC: 168 NG/ML (ref 20–300)
HCT VFR BLD AUTO: 39.3 % (ref 37–48.5)
HGB BLD-MCNC: 12.6 G/DL (ref 12–16)
IMM GRANULOCYTES # BLD AUTO: 0.02 K/UL (ref 0–0.04)
IMM GRANULOCYTES NFR BLD AUTO: 0.2 % (ref 0–0.5)
IRON SERPL-MCNC: 52 UG/DL (ref 30–160)
LYMPHOCYTES # BLD AUTO: 2.1 K/UL (ref 1–4.8)
LYMPHOCYTES NFR BLD: 23.4 % (ref 18–48)
MCH RBC QN AUTO: 27.6 PG (ref 27–31)
MCHC RBC AUTO-ENTMCNC: 32.1 G/DL (ref 32–36)
MCV RBC AUTO: 86 FL (ref 82–98)
MONOCYTES # BLD AUTO: 0.7 K/UL (ref 0.3–1)
MONOCYTES NFR BLD: 7.7 % (ref 4–15)
NEUTROPHILS # BLD AUTO: 5.8 K/UL (ref 1.8–7.7)
NEUTROPHILS NFR BLD: 65.4 % (ref 38–73)
NRBC BLD-RTO: 0 /100 WBC
PLATELET # BLD AUTO: 243 K/UL (ref 150–450)
PMV BLD AUTO: 11.3 FL (ref 9.2–12.9)
RBC # BLD AUTO: 4.56 M/UL (ref 4–5.4)
SATURATED IRON: 14 % (ref 20–50)
TOTAL IRON BINDING CAPACITY: 370 UG/DL (ref 250–450)
TRANSFERRIN SERPL-MCNC: 250 MG/DL (ref 200–375)
WBC # BLD AUTO: 8.82 K/UL (ref 3.9–12.7)

## 2024-01-10 PROCEDURE — 82728 ASSAY OF FERRITIN: CPT | Performed by: NURSE PRACTITIONER

## 2024-01-10 PROCEDURE — 85025 COMPLETE CBC W/AUTO DIFF WBC: CPT | Performed by: NURSE PRACTITIONER

## 2024-01-10 PROCEDURE — 83540 ASSAY OF IRON: CPT | Performed by: NURSE PRACTITIONER

## 2024-01-10 PROCEDURE — 36415 COLL VENOUS BLD VENIPUNCTURE: CPT | Performed by: NURSE PRACTITIONER

## 2024-01-11 LAB
CREAT SERPL-MCNC: 0.9 MG/DL (ref 0.5–1.4)
SAMPLE: NORMAL

## 2024-01-12 ENCOUNTER — HOSPITAL ENCOUNTER (OUTPATIENT)
Dept: SLEEP MEDICINE | Facility: HOSPITAL | Age: 49
Discharge: HOME | End: 2024-01-12
Attending: HOSPITALIST
Payer: COMMERCIAL

## 2024-01-12 DIAGNOSIS — G47.33 OBSTRUCTIVE SLEEP APNEA: Primary | ICD-10-CM

## 2024-01-12 DIAGNOSIS — F51.04 PSYCHOPHYSIOLOGICAL INSOMNIA: ICD-10-CM

## 2024-01-12 DIAGNOSIS — Z72.821 INADEQUATE SLEEP HYGIENE: ICD-10-CM

## 2024-01-12 PROCEDURE — 95811 POLYSOM 6/>YRS CPAP 4/> PARM: CPT

## 2024-01-17 PROCEDURE — 95811 POLYSOM 6/>YRS CPAP 4/> PARM: CPT | Mod: 26,,, | Performed by: PSYCHOLOGIST

## 2024-01-18 ENCOUNTER — PATIENT MESSAGE (OUTPATIENT)
Dept: PULMONOLOGY | Facility: CLINIC | Age: 49
End: 2024-01-18
Payer: COMMERCIAL

## 2024-01-18 DIAGNOSIS — G47.33 OSA (OBSTRUCTIVE SLEEP APNEA): Primary | ICD-10-CM

## 2024-01-18 NOTE — PROCEDURES
"Patient Name: Mer Priest    Date of Report: 24   Study Date:  2024  [facility] No.: [id number]    : 1975    Time of Study:  09:48:38 PM - 04:54:50 AM   Sex:  Female   Age:  48 year   Weight:  219.0 lbs Height:  5' 7"     Type of Study:  CPAP Titration / post-HSAT    REASONS FOR REFERRAL: Ms Priest is a 48 year old female, referred for CPAP titration polysomnography by Nicol Ram PA-C, based on her home sleep apnea test of 23 which revealed Apnea + Hypopnea Index = 46.0 events / hr asleep, severe obstructive sleep apnea / hypopnea syndrome (OSAHS).  Note that this A + H I is only an estimated quantity, as sleep was not assessed in the HSAT, and an undetermined number of the scored respiratory events may not have occurred during sleep.  CPAP titration was recommended by the interpreting sleep specialist physician, Dr. Jakob Garner, and Ms Ram, the referring provider, ordered it.  Dr. Venecia Smith was the originating referring physician, and is the patient's primary care physician.    STUDY PARAMETERS: This study involved analysis of the patient's sleep pattern while breathing was assisted. The study was performed with a sleep technologist in attendance for the entire test period, with video monitoring throughout the study, and routine laboratory clinical parameters recorded:  NOTE:  This polysomnographic sleep study was reviewed epoch-by-epoch, interpreted and signed below by an American Academy of Sleep Medicine Board Certified Sleep Specialist    SUMMARY STATEMENTS  DIAGNOSTIC IMPRESSIONS  G47.33  /  327.23 Severe Obstructive Sleep Apnea, Adult (OSAHS)  F51.04   / 307.42 Psychophysiological Insomnia (stress - related and conditioned; with depression and anxiety)    Z72.821 /  V69.4 Inadequate Sleep Hygiene  G47.22  /   327.32 r/o Advanced Sleep - Wake Phase Disorder     PRIMARY TREATMENT RECOMMENDATIONS  Treat, or refer to Sleep Disorders Center.  CPAP was initiated at " 09:48:38 PM.  The CPAP titration polysomnography revealed that 13 cm H2O pressure (C - Flex 2, humidity on)   the most effective pressure most completely tested, was optimal, as it reduced the rate of respiratory events 92 % to A + H Index = 3.5 events / hr asleep in 3.1 hrs sleep, with 0.5 hrs REM sleep.  Higher pressure also could be successful (15 cm A + H I = 2.1,  in 0.5 hrs sleep with 0.2 hrs REM sleep).  Snoring was very infrequent and mild before it was eliminated at 13 cm and above (15 cm). AutoCPAP (6 cm - 20 cm) could be tried if necessary.          The overall A + H Index was 3.6 / hr asleep, with only 1.3 respiratory event - related arousals / hr asleep and no RERAs for the titration trial.  The mean SpO2 value was 90.5 % throughout the study, moderate, with a minimum oxygen saturation during sleep of 82.0 % and a maximum waking baseline SpO2 of 97.0 %).   A medium ResMed  AirFit  F10 full - face  CPAP mask was used and was well - tolerated.  Please see PAP trial outcomes table, below.                 CPAP Treatment Titration Outcomes Table    PAP  Device PAP  Level Time  (min) Wake  (min) NREM(min) REM  (min) Sleep  Eff% OA# CA# MA# Hyp# AHI RDI Min  OSat LM  Index AR  Index   CPAP 7 90.5 22.5 68.0 0.0 75.1% - - - 3 2.6 2.6 84.0 - 4.4   CPAP 9 47.0 0.5 46.5 0.0 98.9% - - - 2 2.6 2.6 85.0 - 3.9   CPAP 11 44.0 0.0 44.0 0.0 100.0% - - - 5 6.8 6.8 82.0 - 17.7   CPAP 13 48.0 0.5 19.0 28.5 99.0% - - - 3 3.8 3.8 85.0 - 17.7   CPAP 15 56.0 27.5 14.5 14.0 50.9% - - - 1 2.1 2.1 85.0 - 25.3   CPAP 13 141.0 4.0 133.5 3.5 97.2% 2 2 - 4 3.5 3.5 86.0 - 19.3     Weight loss to the normal range is recommended as it can decrease respiratory events and snoring in overweight patients.  The following changes in sleep hygiene / sleep - related behavior are recommended after medical treatments are successful  Limit time for sleep (now 8 -10 hrs /night) to hours of sleep needed for adequate daytime functioning (7.5 to 9.0 hrs  / night).  Regular bedtimes and wake times, including weekends: Total sleep time / night should not be more than one hour more           than usual, and bedtime or wake time should not be more than one hour earlier or later than usual.    Do not attempt to make up lost sleep by extending sleep periods.    Avoid meals or large snacks within 3 hours of bedtime.    SECONDARY TREATMENT RECOMMENDATIONS  Treat, or refer to SDC if problems are not satisfactorily resolved by the above.  If  insomnia persists after treatments for medical sleep disorders have proven effective, recommend  follow - up inquiry regarding frequency, duration and nature of reported sleep loss, delayed sleep onset, poor sleep maintenance and involuntary early awakening (stress - related and / or conditioned psychophysiological insomnia;  r/o  advanced sleep - wake phase disorder) and referral for behavioral and cognitive - behavioral treatment of insomnia, as indicated.  Please see SDI  Consider behavioral and cognitive / behavioral treatments for stress, anxiety and depression to complement the medication and to   increase the probability of long - term adaptive change.  Sleep might be expected to further improve.    See below for a complete interpretation of data from the polysomnography and Sleep Disorders Inventory.     Thank you for referring this patient to the [facility].      Hugh Garcia, Ph.D., ABPP; Diplomate, American Board of Sleep Medicine

## 2024-01-22 ENCOUNTER — DOCUMENTATION ONLY (OUTPATIENT)
Dept: CARDIOLOGY | Facility: HOSPITAL | Age: 49
End: 2024-01-22
Payer: COMMERCIAL

## 2024-01-22 PROBLEM — N92.1 MENORRHAGIA WITH IRREGULAR CYCLE: Status: ACTIVE | Noted: 2024-01-22

## 2024-01-22 NOTE — PROGRESS NOTES
[facility] patient of Dr. Roberts's. CT chest images uploaded to [facility] for TPVR evaluation. Dr. Roberts updated and will contact patient after results reviewed by Interventional team.

## 2024-01-22 NOTE — PROGRESS NOTES
Subjective:       Patient ID: Mer Priest is a 48 y.o. female.    Chief Complaint:   1. Iron deficiency anemia due to chronic blood loss  Ferritin    Iron and TIBC    CBC Auto Differential      2. Menorrhagia with irregular cycle  Ferritin    Iron and TIBC    CBC Auto Differential        Current Treatment:       Treatment History:  OP FERUMOXYTOL on 11/21/2023 & 11/28/2023    HPI: This is a 48 year old woman with syphilis, HTN, GERD, benign paroxysmal positional vertigo, and heart disease who is seen in Hem/Onc for iron deficiency anemia believed to be secondary to menorrhagia. 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 She has upcoming Cardiology appointment to discuss. Also previously recommended upper and lower endoscopies but unable to proceed due to needing cardiac clearance. Patient previously treated with Venofer with good response.      Interval History: Patient presents for follow up on labs. She presents alone at work and denies fatigue, weakness, headaches, dizziness, lightheadedness, being cold, and SOB. She does report a decreased appetite but acknowledges recently being diagnosed as diabetic and not being able to eat the same foods. She also reports being on Lexapro; advised her that can curb her appetite as well. She was prescribed Mounjaro and Ozempic and wants to know which to start; advised her to follow up with her PCP to inform her of her decreased appetite and to get directions on if she should start and which medications when. She reports not having had menses since September until being ill in December. She states she was coughing a lot and experienced menses afterwards. Iron low normal at 52; saturation low at 14%. Remaining iron studies WNL. Advised her to start Geritol daily for iron supplementation. She is amenable to this.     Reviewed labs with patient:   CBC:   Recent Labs   Lab 01/10/24  1605   WBC 8.82   RBC 4.56    Hemoglobin 12.6   Hematocrit 39.3   Platelets 243   MCV 86   MCH 27.6   MCHC 32.1     CMP:  Recent Labs   Lab 23  1035 23  1111   Glucose 105 88   Calcium 9.8 9.3   Albumin 4.1 4.3   Total Protein 8.2  --    Sodium 141 142   Potassium 3.9 3.9   CO2 24 25   Chloride 106 104   BUN 13 12   Creatinine 0.8 0.85   Alkaline Phosphatase 54 L  --    ALT 13 9   AST 13 12   Total Bilirubin 0.6 0.6     Lab Results   Component Value Date    IRON 52 01/10/2024    TRANSFERRIN 250 01/10/2024    TIBC 370 01/10/2024    FESATURATED 14 (L) 01/10/2024      Lab Results   Component Value Date    FERRITIN 168 01/10/2024     The patient location is: [place]  The chief complaint leading to consultation is: iron deficiency anemia    Visit type: audiovisual    Face to Face time with patient: 11 minutes  19 minutes of total time spent on the encounter, which includes face to face time and non-face to face time preparing to see the patient (eg, review of tests), Obtaining and/or reviewing separately obtained history, Documenting clinical information in the electronic or other health record, Independently interpreting results (not separately reported) and communicating results to the patient/family/caregiver, or Care coordination (not separately reported).     Each patient to whom he or she provides medical services by telemedicine is:  (1) informed of the relationship between the physician and patient and the respective role of any other health care provider with respect to management of the patient; and (2) notified that he or she may decline to receive medical services by telemedicine and may withdraw from such care at any time.    Social History     Socioeconomic History    Marital status: Single   Tobacco Use    Smoking status: Former     Current packs/day: 0.00     Types: Cigarettes     Quit date: 2022     Years since quittin.7    Smokeless tobacco: Never   Substance and Sexual Activity    Alcohol use: Not Currently     Drug use: Never    Sexual activity: Not Currently     Social Determinants of Health     Financial Resource Strain: High Risk (1/23/2024)    Overall Financial Resource Strain (CARDIA)     Difficulty of Paying Living Expenses: Very hard   Food Insecurity: Food Insecurity Present (1/23/2024)    Hunger Vital Sign     Worried About Running Out of Food in the Last Year: Often true     Ran Out of Food in the Last Year: Often true   Transportation Needs: No Transportation Needs (1/23/2024)    PRAPARE - Transportation     Lack of Transportation (Medical): No     Lack of Transportation (Non-Medical): No   Physical Activity: Inactive (1/23/2024)    Exercise Vital Sign     Days of Exercise per Week: 0 days     Minutes of Exercise per Session: 0 min   Stress: Stress Concern Present (1/23/2024)    [employer] - Occupational Stress Questionnaire     Feeling of Stress : To some extent   Social Connections: Unknown (1/23/2024)    Social Connection and Isolation Panel [NHANES]     Frequency of Communication with Friends and Family: More than three times a week     Frequency of Social Gatherings with Friends and Family: Once a week     Active Member of Clubs or Organizations: Yes     Attends Club or Organization Meetings: 1 to 4 times per year     Marital Status:    Housing Stability: Unknown (1/23/2024)    Housing Stability Vital Sign     Unable to Pay for Housing in the Last Year: No     Unstable Housing in the Last Year: No     Past Medical History:   Diagnosis Date    Benign paroxysmal positional vertigo 7/25/2023    GERD without esophagitis 7/25/2023    Heart disease     Hypertension     Syphilis 11/2020     Family History   Problem Relation Age of Onset    Prostate cancer Father     Ovarian cancer Cousin      Past Surgical History:   Procedure Laterality Date    BILATERAL TUBAL LIGATION      TETRALOGY OF FALLOT REPAIR  1977     Review of Systems   Constitutional:  Positive for appetite  change (decreased). Negative for fatigue.   HENT:  Negative for mouth sores, rhinorrhea and sore throat.    Eyes: Negative.    Respiratory: Negative.  Negative for shortness of breath.    Cardiovascular: Negative.    Gastrointestinal:  Negative for constipation, diarrhea, nausea and vomiting.   Endocrine: Negative.  Negative for cold intolerance.   Genitourinary: Negative.    Musculoskeletal: Negative.    Integumentary:  Negative.   Allergic/Immunologic: Negative.    Neurological:  Negative for dizziness, weakness, light-headedness, numbness and headaches.   Hematological: Negative.    Psychiatric/Behavioral: Negative.         Medication List with Changes/Refills   Current Medications    AMLODIPINE (NORVASC) 5 MG TABLET    Take 1 tablet by mouth every day    BENZONATATE (TESSALON) 200 MG CAPSULE    TAKE 1 CAPSULE (ORAL) 3 TIMES PER DAY AS NEEDED FOR COUGH DO NOT CUT, BITE, CRUSH OR CHEW.    BLOOD SUGAR DIAGNOSTIC STRP    To check BG 3 times daily, to use with insurance preferred meter    BLOOD-GLUCOSE METER KIT    To check BG 3 times daily, to use with insurance preferred meter    CETIRIZINE (ZYRTEC) 10 MG TABLET    Take 1 tablet (10 mg total) by mouth daily as needed for Rhinitis.    ERGOCALCIFEROL (ERGOCALCIFEROL) 50,000 UNIT CAP    Take 1 capsule (50,000 Units total) by mouth every 7 days.    ESCITALOPRAM OXALATE (LEXAPRO) 10 MG TABLET    Take 1 tablet (10 mg total) by mouth once daily.    GABAPENTIN (NEURONTIN) 300 MG CAPSULE    Take 1 capsule by mouth 3 (three) times daily as needed.    HYDROCHLOROTHIAZIDE (HYDRODIURIL) 25 MG TABLET    Take 1 tablet (25 mg total) by mouth once daily.    LANCETS MISC    To check BG 3 times daily, to use with insurance preferred meter    LEVOCETIRIZINE (XYZAL) 5 MG TABLET    Take 1 tablet (5 mg total) by mouth every evening.    MECLIZINE (ANTIVERT) 25 MG TABLET    Take 1 tablet (25 mg total) by mouth 3 (three) times daily as needed for Dizziness.    METOPROLOL SUCCINATE  (TOPROL-XL) 50 MG 24 HR TABLET    Take 1 tablet (50 mg total) by mouth 2 (two) times daily.    SEMAGLUTIDE (OZEMPIC) 0.25 MG OR 0.5 MG (2 MG/3 ML) PEN INJECTOR    Inject 0.25 mg into the skin every 7 days.     Objective:   There were no vitals filed for this visit.  Physical Exam     Unable to assess due to virtual visit.    (0) Fully active, able to carry on all predisease performance without restriction  Assessment:     Problem List Items Addressed This Visit          Renal/    Menorrhagia with irregular cycle     No menses from September to November. No menses since December.          Relevant Orders    Ferritin    Iron and TIBC    CBC Auto Differential       Oncology    Iron deficiency anemia due to chronic blood loss - Primary     Treated with Venofer in the past. Most recently received Feraheme x 2 doses.          Relevant Orders    Ferritin    Iron and TIBC    CBC Auto Differential     Plan:     Iron deficiency anemia due to chronic blood loss  -     Ferritin; Future; Expected date: 04/23/2024  -     Iron and TIBC; Future; Expected date: 04/23/2024  -     CBC Auto Differential; Future; Expected date: 04/23/2024    Menorrhagia with irregular cycle  -     Ferritin; Future; Expected date: 04/23/2024  -     Iron and TIBC; Future; Expected date: 04/23/2024  -     CBC Auto Differential; Future; Expected date: 04/23/2024    Labs reviewed.   Start Geritol supplement daily with food for iron deficiency.  Follow up in  3 months  with  iron profile, ferritin, and CBC.    Route Chart for Scheduling    Med Onc Chart Routing      Follow up with physician    Follow up with [facility] 3 months.   Infusion scheduling note    Injection scheduling note    Labs CBC, ferritin and iron and TIBC   Scheduling:  Preferred lab:  Lab interval:  in 3 months, 2 days prior   Imaging None      Pharmacy appointment No pharmacy appointment needed      Other referrals       No additional referrals needed             I will review assessment/plan  with collaborating physician.      KEYLA Eastman

## 2024-01-23 ENCOUNTER — OFFICE VISIT (OUTPATIENT)
Dept: HEMATOLOGY/ONCOLOGY | Facility: CLINIC | Age: 49
End: 2024-01-23
Payer: COMMERCIAL

## 2024-01-23 DIAGNOSIS — D50.0 IRON DEFICIENCY ANEMIA DUE TO CHRONIC BLOOD LOSS: Primary | ICD-10-CM

## 2024-01-23 DIAGNOSIS — N92.1 MENORRHAGIA WITH IRREGULAR CYCLE: ICD-10-CM

## 2024-01-23 PROCEDURE — 99214 OFFICE O/P EST MOD 30 MIN: CPT | Mod: 95,,, | Performed by: NURSE PRACTITIONER

## 2024-01-23 PROCEDURE — 1160F RVW MEDS BY RX/DR IN RCRD: CPT | Mod: CPTII,95,, | Performed by: NURSE PRACTITIONER

## 2024-01-23 PROCEDURE — 1159F MED LIST DOCD IN RCRD: CPT | Mod: CPTII,95,, | Performed by: NURSE PRACTITIONER

## 2024-02-02 ENCOUNTER — OFFICE VISIT (OUTPATIENT)
Dept: OTOLARYNGOLOGY | Facility: CLINIC | Age: 49
End: 2024-02-02
Payer: COMMERCIAL

## 2024-02-02 DIAGNOSIS — H61.22 IMPACTED CERUMEN OF LEFT EAR: Primary | ICD-10-CM

## 2024-02-02 DIAGNOSIS — H60.542 DERMATITIS OF LEFT EAR CANAL: ICD-10-CM

## 2024-02-02 PROCEDURE — 1159F MED LIST DOCD IN RCRD: CPT | Mod: CPTII,S$GLB,, | Performed by: STUDENT IN AN ORGANIZED HEALTH CARE EDUCATION/TRAINING PROGRAM

## 2024-02-02 PROCEDURE — 69210 REMOVE IMPACTED EAR WAX UNI: CPT | Mod: S$GLB,,, | Performed by: STUDENT IN AN ORGANIZED HEALTH CARE EDUCATION/TRAINING PROGRAM

## 2024-02-02 PROCEDURE — 99999 PR PBB SHADOW E&M-EST. PATIENT-LVL III: CPT | Mod: PBBFAC,,, | Performed by: STUDENT IN AN ORGANIZED HEALTH CARE EDUCATION/TRAINING PROGRAM

## 2024-02-02 PROCEDURE — 99213 OFFICE O/P EST LOW 20 MIN: CPT | Mod: 25,S$GLB,, | Performed by: STUDENT IN AN ORGANIZED HEALTH CARE EDUCATION/TRAINING PROGRAM

## 2024-02-02 RX ORDER — FLUOCINOLONE ACETONIDE 0.11 MG/ML
3 OIL AURICULAR (OTIC) 2 TIMES DAILY
Qty: 20 ML | Refills: 0 | Status: SHIPPED | OUTPATIENT
Start: 2024-02-02 | End: 2024-05-27

## 2024-02-02 NOTE — PROGRESS NOTES
Chief complaint:   Chief Complaint   Patient presents with    Otalgia     Right ear, onset Tuesday         Referring Provider:  No referring provider defined for this encounter.    History of Present Illness 7/27/23:     Ms. Priest is a 48 y.o. female presenting for evaluation of dizziness.     Onset: first started on Febraury 28, 2023. While driving [place] the [facility] bridge felt she as getting woozy. Resolved when she got off the bridge, the symptoms resolved. Happened again when she was getting off the elevator.     Episodes were most days previously. Almost always when she is driving - sensation of woozy, lightheaded. Associated with anxiety and worsened since losing her son.      Since then has been much less frequent. Better since starting metoprolol.     No episodes of room spinning , although this did happen a few times at first.      Worked up by cardiology. Recently had monitor placed.     Associated signs and symptoms:    [] Hearing loss  [] Ear fullness  [] Tinnitus  [] Headache  [] Light sensitivity  [] Sound sensitivity  [] Nausea   [] Vomiting  [] Dizziness with valsalva or weather change  [] Autophony    The patient denies significant hearing loss risk factors, ototoxic or vestibulotoxic medication exposure, chronic vestibular suppressant use, head/ facial/ davina trauma, and otologic surgery.      Update, 2/2/24  Left ear pain since Tuesday. Radiates to neck/throat. Comes and goes. Associated left ear itching, fullness.    Dizziness has improved since last visit.    History        Past Medical History:   Past Medical History:   Diagnosis Date    Benign paroxysmal positional vertigo 7/25/2023    GERD without esophagitis 7/25/2023    Heart disease     Hypertension     Syphilis 11/2020    .          Past Surgical History:  Past Surgical History:   Procedure Laterality Date    BILATERAL TUBAL LIGATION      TETRALOGY OF FALLOT REPAIR  1977   .         Medications: Medication list was reviewed. She  has a  current medication list which includes the following prescription(s): amlodipine, benzonatate, blood sugar diagnostic, blood-glucose meter, cetirizine, ergocalciferol, escitalopram oxalate, gabapentin, hydrochlorothiazide, lancets, levocetirizine, meclizine, metoprolol succinate, and ozempic.         Allergies:   Review of patient's allergies indicates:   Allergen Reactions    Ace inhibitors Swelling    Lisinopril Other (See Comments)            Family history: family history includes Ovarian cancer in her cousin; Prostate cancer in her father.         Social History          Alcohol use:  reports that she does not currently use alcohol.            Tobacco:  reports that she quit smoking about 21 months ago. Her smoking use included cigarettes. She has never used smokeless tobacco.         Please see the patient's intake form for full details of past medical history, past surgical history, family history, social history and review of systems. ?This information was reviewed by me and noted.      Physical Examination     There were no vitals filed for this visit.       General: Well developed, well nourished, well hydrated. Verbal with a strong voice and not dysphonic.     Head/Face: Normocephalic, atraumatic. No scars or lesions. Facial musculature equal.     Eyes: No scleral icterus or conjunctival hemorrhage. EOMI. PERRLA.     Ears:     Right ear: No gross deformity. EAC is clear of debris and erythema. The TM is intact with a pneumatized middle ear. No signs of retraction, fluid or infection.      Left ear: No gross deformity. EAC is clear of debris and erythema. The TM is intact with a pneumatized middle ear. No signs of retraction, fluid or infection.     Neurologic: Moving all extremities without gross abnormality.CN II-XII grossly intact. House-Brackmann 1/6.     Motor strength:  Strength 5/5 throughout.    Sensation:  Sensory function to light touch and proprioception intact throughout.    Gait:  No ataxia  and can tandem gait 6 steps.    Romberg test:  No abnormal sway or falls with eyes open and closed.     Finger-to-nose testing intact without dysmetria.        Data review    Audiology assessment  Right Elías-Hallpike: Negative for BPPV  Left Elías-Hallpike: Negative for BPPV      Procedure: ear microscopy with removal of cerumen    Description: The patient was in agreement with the examination and debridement of the ears. Removal of the cerumen required use of an operating microscope and multiple micro-instruments.     With the patient in the supine position, we used the operating microscope to examine both ears with the appropriate sized ear speculum.  A variety of sterile, micro-instruments were utilized to remove the cerumen atraumatically.  I performed the procedure which required a significant amount of time and effort. The tympanic membrane was then well visualized.  The patient tolerated the procedure well and there were no complications.    Findings:     Left ear had significant dried wax, the EAC was normal except for dried flaking skin, and the tympanic membrane was intact with no evidence of middle ear fluid.       Assessment     1. Impacted cerumen of left ear    2. Dermatitis of left ear canal            Plan:      Persistent postural perceptual dizziness (3PD)    Classic symptoms inclue dizziness that is poorly described, chronic imbalance, and patients may have trouble in settings with lots of visual movements (large groups). Additionally, there is almost always a preceeding event that startd the dizziness such as a head injury or trauma, new heart disease, medications, illness, or worsening anxiety/depressoin.     Has noticed improved since starting metoprolol. Has had less anxiety when driving and less associated dizziness.    We discussed continuing metoprool and re-evaluating symptoms in 1 month. If insufficient improvement then may add SNRI or SSR. We also discussed that measures to improve her  anxiety, such as CBT might be helpful as well.    Update 2/2/24  Suspect ear pain was secondary to impaction   For her dermatitis we discussed avoiding q-tips and trying dermotic for 1 week, then mineral oil 1-2 x per week for maintenance   Return to clinic if pain not improved in 1 week          Skyler Zavala MD  [facility]   [facility]  [address] [facility] [facility].  [place] [id number]  P: [phone]  F: [phone]

## 2024-02-16 ENCOUNTER — PATIENT MESSAGE (OUTPATIENT)
Dept: PEDIATRIC CARDIOLOGY | Facility: CLINIC | Age: 49
End: 2024-02-16
Payer: COMMERCIAL

## 2024-02-16 ENCOUNTER — PATIENT MESSAGE (OUTPATIENT)
Dept: CARDIOLOGY | Facility: CLINIC | Age: 49
End: 2024-02-16
Payer: COMMERCIAL

## 2024-02-16 ENCOUNTER — TELEPHONE (OUTPATIENT)
Dept: PEDIATRIC CARDIOLOGY | Facility: CLINIC | Age: 49
End: 2024-02-16
Payer: COMMERCIAL

## 2024-02-16 DIAGNOSIS — I37.1 NONRHEUMATIC PULMONARY VALVE INSUFFICIENCY: ICD-10-CM

## 2024-02-16 DIAGNOSIS — Q24.9 ADULT CONGENITAL HEART DISEASE: Primary | ICD-10-CM

## 2024-02-16 DIAGNOSIS — J98.4 PULMONARY INSUFFICIENCY: ICD-10-CM

## 2024-02-16 DIAGNOSIS — Z87.74 S/P REPAIR OF TETRALOGY OF FALLOT: ICD-10-CM

## 2024-02-16 NOTE — TELEPHONE ENCOUNTER
Spoke to pt via [facility] portal. Patient's hx and CTA reviewed for [place] PPVR and deemed a candidate. Patient agreed to March 28th for PPVR procedure. Pre procedural instructions given and pt stated understanding. Letter with instructions sent via portal and by mail to address on file. Dr. Roberts updated at this time.

## 2024-02-16 NOTE — TELEPHONE ENCOUNTER
----- Message from Manuel Sanchez Jr., MD sent at 2/15/2024  3:57 PM CST -----  Both for harmony please  ----- Message -----  From: Frances Pan RN  Sent: 2/14/2024   9:52 AM CST  To: Manuel Sanchez Jr., MD; #    Both Valve reports are in your emails. Please advise.    Thanks!  Frances   ----- Message -----  From: Dayna Mackey RN  Sent: 1/22/2024   1:51 PM CST  To: Amrik Roberts MD; [facility] Cath Schedule

## 2024-03-25 ENCOUNTER — TELEPHONE (OUTPATIENT)
Dept: PEDIATRIC CARDIOLOGY | Facility: CLINIC | Age: 49
End: 2024-03-25
Payer: COMMERCIAL

## 2024-03-25 NOTE — TELEPHONE ENCOUNTER
----- Message from Zahra Beckford sent at 3/25/2024 12:00 PM CDT -----  Patient is scheduled to have procedure on 3/28 patient states she's been coughing nonstop since Friday and is a little congested went to urgent care NP stated pt is fine pt would like to speak with someone to make sure it is ok to proceed with CATH          Patient [phone]        Thank you  Scheduling

## 2024-03-25 NOTE — TELEPHONE ENCOUNTER
Spoke to pt regarding concerns. Advised okay to proceed with PPVR on 3/28 per Dr. Pfeiffer. Will touch base on Wednesday to see how pt is feeling. Pt stated understanding.

## 2024-04-11 ENCOUNTER — PATIENT MESSAGE (OUTPATIENT)
Dept: CARDIOLOGY | Facility: CLINIC | Age: 49
End: 2024-04-11
Payer: COMMERCIAL

## 2024-04-19 NOTE — PRE-PROCEDURE INSTRUCTIONS
Called pt and advised her to hold ozempic medication x 1 week,  hold hydrodiuril and Norvasc the morning of the procedure. Pt was instructed to take metoprolol the morning of the procedure. Pt stated she had run out of her 5 mg tablets of Norvasc recently and had been take 10 mg tablets of Norvasc every other day. I advised pt to follow up with pcp and to take 5 mg as prescribed. Pt verbalized understanding, all questions answered.

## 2024-04-23 ENCOUNTER — ANESTHESIA (OUTPATIENT)
Dept: MEDSURG UNIT | Facility: HOSPITAL | Age: 49
DRG: 266 | End: 2024-04-23
Payer: COMMERCIAL

## 2024-04-23 ENCOUNTER — HOSPITAL ENCOUNTER (INPATIENT)
Facility: HOSPITAL | Age: 49
LOS: 1 days | Discharge: HOME | DRG: 266 | End: 2024-04-24
Attending: PEDIATRICS | Admitting: PEDIATRICS
Payer: COMMERCIAL

## 2024-04-23 ENCOUNTER — ANESTHESIA EVENT (OUTPATIENT)
Dept: MEDSURG UNIT | Facility: HOSPITAL | Age: 49
DRG: 266 | End: 2024-04-23
Payer: COMMERCIAL

## 2024-04-23 DIAGNOSIS — E66.9 OBESITY WITH BODY MASS INDEX 30 OR GREATER: ICD-10-CM

## 2024-04-23 DIAGNOSIS — Q21.3 TETRALOGY OF FALLOT: ICD-10-CM

## 2024-04-23 DIAGNOSIS — Q24.9 ADULT CONGENITAL HEART DISEASE: ICD-10-CM

## 2024-04-23 DIAGNOSIS — I51.7 RIGHT VENTRICULAR ENLARGEMENT: ICD-10-CM

## 2024-04-23 DIAGNOSIS — I37.1 NONRHEUMATIC PULMONARY VALVE INSUFFICIENCY: ICD-10-CM

## 2024-04-23 DIAGNOSIS — Q21.3 TOF (TETRALOGY OF FALLOT): ICD-10-CM

## 2024-04-23 DIAGNOSIS — Z95.3 HISTORY OF PULMONARY VALVE REPLACEMENT WITH BIOPROSTHETIC VALVE: ICD-10-CM

## 2024-04-23 DIAGNOSIS — Z87.74 S/P REPAIR OF TETRALOGY OF FALLOT: Primary | ICD-10-CM

## 2024-04-23 LAB
ABO + RH BLD: NORMAL
ANION GAP SERPL CALC-SCNC: 9 MMOL/L (ref 8–16)
B-HCG UR QL: NEGATIVE
BASOPHILS # BLD AUTO: 0.04 K/UL (ref 0–0.2)
BASOPHILS NFR BLD: 0.4 % (ref 0–1.9)
BLD GP AB SCN CELLS X3 SERPL QL: NORMAL
BUN SERPL-MCNC: 8 MG/DL (ref 6–20)
CALCIUM SERPL-MCNC: 9.6 MG/DL (ref 8.7–10.5)
CHLORIDE SERPL-SCNC: 107 MMOL/L (ref 95–110)
CO2 SERPL-SCNC: 23 MMOL/L (ref 23–29)
CREAT SERPL-MCNC: 0.8 MG/DL (ref 0.5–1.4)
CTP QC/QA: YES
DIFFERENTIAL METHOD BLD: ABNORMAL
EOSINOPHIL # BLD AUTO: 0.2 K/UL (ref 0–0.5)
EOSINOPHIL NFR BLD: 1.6 % (ref 0–8)
ERYTHROCYTE [DISTWIDTH] IN BLOOD BY AUTOMATED COUNT: 13.5 % (ref 11.5–14.5)
EST. GFR  (NO RACE VARIABLE): >60 ML/MIN/1.73 M^2
GLUCOSE SERPL-MCNC: 90 MG/DL (ref 70–110)
HCT VFR BLD AUTO: 41.8 % (ref 37–48.5)
HGB BLD-MCNC: 13.3 G/DL (ref 12–16)
IMM GRANULOCYTES # BLD AUTO: 0.03 K/UL (ref 0–0.04)
IMM GRANULOCYTES NFR BLD AUTO: 0.3 % (ref 0–0.5)
LYMPHOCYTES # BLD AUTO: 1.9 K/UL (ref 1–4.8)
LYMPHOCYTES NFR BLD: 19.6 % (ref 18–48)
MCH RBC QN AUTO: 28.5 PG (ref 27–31)
MCHC RBC AUTO-ENTMCNC: 31.8 G/DL (ref 32–36)
MCV RBC AUTO: 90 FL (ref 82–98)
MONOCYTES # BLD AUTO: 0.6 K/UL (ref 0.3–1)
MONOCYTES NFR BLD: 5.8 % (ref 4–15)
NEUTROPHILS # BLD AUTO: 6.9 K/UL (ref 1.8–7.7)
NEUTROPHILS NFR BLD: 72.3 % (ref 38–73)
NRBC BLD-RTO: 0 /100 WBC
PLATELET # BLD AUTO: 213 K/UL (ref 150–450)
PMV BLD AUTO: 12.7 FL (ref 9.2–12.9)
POCT GLUCOSE: 99 MG/DL (ref 70–110)
POTASSIUM SERPL-SCNC: 3.9 MMOL/L (ref 3.5–5.1)
RBC # BLD AUTO: 4.67 M/UL (ref 4–5.4)
SODIUM SERPL-SCNC: 139 MMOL/L (ref 136–145)
SPECIMEN OUTDATE: NORMAL
WBC # BLD AUTO: 9.51 K/UL (ref 3.9–12.7)

## 2024-04-23 PROCEDURE — 86850 RBC ANTIBODY SCREEN: CPT | Performed by: PEDIATRICS

## 2024-04-23 PROCEDURE — 37000008 HC ANESTHESIA 1ST 15 MINUTES: Performed by: PEDIATRICS

## 2024-04-23 PROCEDURE — 25000003 PHARM REV CODE 250: Performed by: NURSE ANESTHETIST, CERTIFIED REGISTERED

## 2024-04-23 PROCEDURE — 27800903 OPTIME MED/SURG SUP & DEVICES OTHER IMPLANTS: Performed by: PEDIATRICS

## 2024-04-23 PROCEDURE — 93662 INTRACARDIAC ECG (ICE): CPT | Mod: 26,82,, | Performed by: PEDIATRICS

## 2024-04-23 PROCEDURE — B31T1ZZ FLUOROSCOPY OF LEFT PULMONARY ARTERY USING LOW OSMOLAR CONTRAST: ICD-10-PCS | Performed by: PEDIATRICS

## 2024-04-23 PROCEDURE — 37000009 HC ANESTHESIA EA ADD 15 MINS: Performed by: PEDIATRICS

## 2024-04-23 PROCEDURE — 84132 ASSAY OF SERUM POTASSIUM: CPT | Performed by: PEDIATRICS

## 2024-04-23 PROCEDURE — C1894 INTRO/SHEATH, NON-LASER: HCPCS | Performed by: PEDIATRICS

## 2024-04-23 PROCEDURE — 86920 COMPATIBILITY TEST SPIN: CPT | Performed by: PEDIATRICS

## 2024-04-23 PROCEDURE — B2151ZZ FLUOROSCOPY OF LEFT HEART USING LOW OSMOLAR CONTRAST: ICD-10-PCS | Performed by: PEDIATRICS

## 2024-04-23 PROCEDURE — C1759 CATH, INTRA ECHOCARDIOGRAPHY: HCPCS | Performed by: PEDIATRICS

## 2024-04-23 PROCEDURE — 82330 ASSAY OF CALCIUM: CPT | Performed by: PEDIATRICS

## 2024-04-23 PROCEDURE — 63600175 PHARM REV CODE 636 W HCPCS: Performed by: NURSE ANESTHETIST, CERTIFIED REGISTERED

## 2024-04-23 PROCEDURE — D9220A PRA ANESTHESIA: Mod: CRNA,,, | Performed by: NURSE ANESTHETIST, CERTIFIED REGISTERED

## 2024-04-23 PROCEDURE — B31S1ZZ FLUOROSCOPY OF RIGHT PULMONARY ARTERY USING LOW OSMOLAR CONTRAST: ICD-10-PCS | Performed by: PEDIATRICS

## 2024-04-23 PROCEDURE — 80048 BASIC METABOLIC PNL TOTAL CA: CPT | Performed by: PEDIATRICS

## 2024-04-23 PROCEDURE — 4A023N6 MEASUREMENT OF CARDIAC SAMPLING AND PRESSURE, RIGHT HEART, PERCUTANEOUS APPROACH: ICD-10-PCS | Performed by: PEDIATRICS

## 2024-04-23 PROCEDURE — 81025 URINE PREGNANCY TEST: CPT | Performed by: PEDIATRICS

## 2024-04-23 PROCEDURE — 63600175 PHARM REV CODE 636 W HCPCS: Performed by: STUDENT IN AN ORGANIZED HEALTH CARE EDUCATION/TRAINING PROGRAM

## 2024-04-23 PROCEDURE — 27201423 OPTIME MED/SURG SUP & DEVICES STERILE SUPPLY: Performed by: PEDIATRICS

## 2024-04-23 PROCEDURE — D9220A PRA ANESTHESIA: Mod: ANES,,, | Performed by: STUDENT IN AN ORGANIZED HEALTH CARE EDUCATION/TRAINING PROGRAM

## 2024-04-23 PROCEDURE — 33477 IMPLANT TCAT PULM VLV PERQ: CPT | Performed by: PEDIATRICS

## 2024-04-23 PROCEDURE — 82947 ASSAY GLUCOSE BLOOD QUANT: CPT | Performed by: PEDIATRICS

## 2024-04-23 PROCEDURE — 85025 COMPLETE CBC W/AUTO DIFF WBC: CPT | Performed by: PEDIATRICS

## 2024-04-23 PROCEDURE — 25000003 PHARM REV CODE 250: Performed by: PEDIATRICS

## 2024-04-23 PROCEDURE — 25000003 PHARM REV CODE 250: Performed by: STUDENT IN AN ORGANIZED HEALTH CARE EDUCATION/TRAINING PROGRAM

## 2024-04-23 PROCEDURE — 93662 INTRACARDIAC ECG (ICE): CPT | Mod: 26,,, | Performed by: PEDIATRICS

## 2024-04-23 PROCEDURE — 20600001 HC STEP DOWN PRIVATE ROOM

## 2024-04-23 PROCEDURE — 33477 IMPLANT TCAT PULM VLV PERQ: CPT | Mod: 22,,, | Performed by: PEDIATRICS

## 2024-04-23 PROCEDURE — 93662 INTRACARDIAC ECG (ICE): CPT | Performed by: PEDIATRICS

## 2024-04-23 PROCEDURE — 85347 COAGULATION TIME ACTIVATED: CPT | Performed by: PEDIATRICS

## 2024-04-23 PROCEDURE — 02RH38Z REPLACEMENT OF PULMONARY VALVE WITH ZOOPLASTIC TISSUE, PERCUTANEOUS APPROACH: ICD-10-PCS | Performed by: PEDIATRICS

## 2024-04-23 PROCEDURE — C1769 GUIDE WIRE: HCPCS | Performed by: PEDIATRICS

## 2024-04-23 PROCEDURE — 33477 IMPLANT TCAT PULM VLV PERQ: CPT | Mod: 82,22,, | Performed by: PEDIATRICS

## 2024-04-23 PROCEDURE — C1751 CATH, INF, PER/CENT/MIDLINE: HCPCS | Performed by: PEDIATRICS

## 2024-04-23 PROCEDURE — 82805 BLOOD GASES W/O2 SATURATION: CPT | Performed by: PEDIATRICS

## 2024-04-23 PROCEDURE — 25500020 PHARM REV CODE 255: Performed by: PEDIATRICS

## 2024-04-23 PROCEDURE — 83605 ASSAY OF LACTIC ACID: CPT | Performed by: PEDIATRICS

## 2024-04-23 RX ORDER — MECLIZINE HCL 12.5 MG 12.5 MG/1
25 TABLET ORAL 3 TIMES DAILY PRN
Status: DISCONTINUED | OUTPATIENT
Start: 2024-04-23 | End: 2024-04-24 | Stop reason: HOSPADM

## 2024-04-23 RX ORDER — LIDOCAINE HYDROCHLORIDE 20 MG/ML
INJECTION INTRAVENOUS
Status: DISCONTINUED | OUTPATIENT
Start: 2024-04-23 | End: 2024-04-23

## 2024-04-23 RX ORDER — HEPARIN SODIUM 1000 [USP'U]/ML
INJECTION, SOLUTION INTRAVENOUS; SUBCUTANEOUS
Status: DISCONTINUED | OUTPATIENT
Start: 2024-04-23 | End: 2024-04-23

## 2024-04-23 RX ORDER — HYDRALAZINE HYDROCHLORIDE 20 MG/ML
5 INJECTION INTRAMUSCULAR; INTRAVENOUS EVERY 30 MIN PRN
Status: COMPLETED | OUTPATIENT
Start: 2024-04-23 | End: 2024-04-23

## 2024-04-23 RX ORDER — PROPOFOL 10 MG/ML
VIAL (ML) INTRAVENOUS
Status: DISCONTINUED | OUTPATIENT
Start: 2024-04-23 | End: 2024-04-23

## 2024-04-23 RX ORDER — IODIXANOL 320 MG/ML
INJECTION, SOLUTION INTRAVASCULAR
Status: DISCONTINUED | OUTPATIENT
Start: 2024-04-23 | End: 2024-04-24 | Stop reason: HOSPADM

## 2024-04-23 RX ORDER — SODIUM CHLORIDE 0.9 % (FLUSH) 0.9 %
10 SYRINGE (ML) INJECTION
Status: DISCONTINUED | OUTPATIENT
Start: 2024-04-23 | End: 2024-04-24 | Stop reason: HOSPADM

## 2024-04-23 RX ORDER — GABAPENTIN 300 MG/1
300 CAPSULE ORAL 3 TIMES DAILY PRN
Status: DISCONTINUED | OUTPATIENT
Start: 2024-04-23 | End: 2024-04-24 | Stop reason: HOSPADM

## 2024-04-23 RX ORDER — ROCURONIUM BROMIDE 10 MG/ML
INJECTION, SOLUTION INTRAVENOUS
Status: DISCONTINUED | OUTPATIENT
Start: 2024-04-23 | End: 2024-04-23

## 2024-04-23 RX ORDER — LIDOCAINE HYDROCHLORIDE ANHYDROUS AND DEXTROSE MONOHYDRATE .8; 5 G/100ML; G/100ML
INJECTION, SOLUTION INTRAVENOUS CONTINUOUS PRN
Status: DISCONTINUED | OUTPATIENT
Start: 2024-04-23 | End: 2024-04-23

## 2024-04-23 RX ORDER — CEFAZOLIN SODIUM 1 G/3ML
INJECTION, POWDER, FOR SOLUTION INTRAMUSCULAR; INTRAVENOUS
Status: DISCONTINUED | OUTPATIENT
Start: 2024-04-23 | End: 2024-04-23

## 2024-04-23 RX ORDER — AMLODIPINE BESYLATE 5 MG/1
5 TABLET ORAL DAILY
Status: DISCONTINUED | OUTPATIENT
Start: 2024-04-23 | End: 2024-04-24 | Stop reason: HOSPADM

## 2024-04-23 RX ORDER — FENTANYL CITRATE 50 UG/ML
INJECTION, SOLUTION INTRAMUSCULAR; INTRAVENOUS
Status: DISCONTINUED | OUTPATIENT
Start: 2024-04-23 | End: 2024-04-23

## 2024-04-23 RX ORDER — OXYCODONE HYDROCHLORIDE 5 MG/1
5 TABLET ORAL ONCE
Status: COMPLETED | OUTPATIENT
Start: 2024-04-23 | End: 2024-04-23

## 2024-04-23 RX ORDER — FENTANYL CITRATE 50 UG/ML
25 INJECTION, SOLUTION INTRAMUSCULAR; INTRAVENOUS EVERY 5 MIN PRN
Status: DISCONTINUED | OUTPATIENT
Start: 2024-04-23 | End: 2024-04-23

## 2024-04-23 RX ORDER — MIDAZOLAM HYDROCHLORIDE 1 MG/ML
INJECTION INTRAMUSCULAR; INTRAVENOUS
Status: DISCONTINUED | OUTPATIENT
Start: 2024-04-23 | End: 2024-04-23

## 2024-04-23 RX ORDER — DEXMEDETOMIDINE HYDROCHLORIDE 4 UG/ML
0-1.4 INJECTION, SOLUTION INTRAVENOUS CONTINUOUS
Status: DISCONTINUED | OUTPATIENT
Start: 2024-04-23 | End: 2024-04-23

## 2024-04-23 RX ORDER — HYDROCHLOROTHIAZIDE 12.5 MG/1
25 TABLET ORAL DAILY
Status: DISCONTINUED | OUTPATIENT
Start: 2024-04-23 | End: 2024-04-24 | Stop reason: HOSPADM

## 2024-04-23 RX ORDER — OXYCODONE HYDROCHLORIDE 5 MG/1
5 TABLET ORAL
Status: DISCONTINUED | OUTPATIENT
Start: 2024-04-23 | End: 2024-04-24 | Stop reason: HOSPADM

## 2024-04-23 RX ORDER — PROTAMINE SULFATE 10 MG/ML
INJECTION, SOLUTION INTRAVENOUS
Status: DISCONTINUED | OUTPATIENT
Start: 2024-04-23 | End: 2024-04-23

## 2024-04-23 RX ORDER — METOPROLOL SUCCINATE 50 MG/1
50 TABLET, EXTENDED RELEASE ORAL 2 TIMES DAILY
Status: DISCONTINUED | OUTPATIENT
Start: 2024-04-23 | End: 2024-04-24 | Stop reason: HOSPADM

## 2024-04-23 RX ORDER — ACETAMINOPHEN 500 MG
1000 TABLET ORAL EVERY 6 HOURS PRN
Status: DISCONTINUED | OUTPATIENT
Start: 2024-04-23 | End: 2024-04-24 | Stop reason: HOSPADM

## 2024-04-23 RX ORDER — DULOXETIN HYDROCHLORIDE 30 MG/1
30 CAPSULE, DELAYED RELEASE ORAL DAILY
Status: DISCONTINUED | OUTPATIENT
Start: 2024-04-23 | End: 2024-04-23

## 2024-04-23 RX ORDER — ONDANSETRON HYDROCHLORIDE 2 MG/ML
4 INJECTION, SOLUTION INTRAVENOUS DAILY PRN
Status: DISCONTINUED | OUTPATIENT
Start: 2024-04-23 | End: 2024-04-24 | Stop reason: HOSPADM

## 2024-04-23 RX ORDER — ONDANSETRON HYDROCHLORIDE 2 MG/ML
INJECTION, SOLUTION INTRAVENOUS
Status: DISCONTINUED | OUTPATIENT
Start: 2024-04-23 | End: 2024-04-23

## 2024-04-23 RX ORDER — NICARDIPINE HYDROCHLORIDE 2.5 MG/ML
INJECTION INTRAVENOUS
Status: DISCONTINUED | OUTPATIENT
Start: 2024-04-23 | End: 2024-04-23

## 2024-04-23 RX ADMIN — MIDAZOLAM HYDROCHLORIDE 1 MG: 1 INJECTION, SOLUTION INTRAMUSCULAR; INTRAVENOUS at 10:04

## 2024-04-23 RX ADMIN — SODIUM CHLORIDE: 0.9 INJECTION, SOLUTION INTRAVENOUS at 10:04

## 2024-04-23 RX ADMIN — HYDRALAZINE HYDROCHLORIDE 5 MG: 20 INJECTION, SOLUTION INTRAMUSCULAR; INTRAVENOUS at 04:04

## 2024-04-23 RX ADMIN — SUGAMMADEX 200 MG: 100 INJECTION, SOLUTION INTRAVENOUS at 12:04

## 2024-04-23 RX ADMIN — OXYCODONE 5 MG: 5 TABLET ORAL at 04:04

## 2024-04-23 RX ADMIN — PROPOFOL 50 MG: 10 INJECTION, EMULSION INTRAVENOUS at 12:04

## 2024-04-23 RX ADMIN — AMLODIPINE BESYLATE 5 MG: 5 TABLET ORAL at 03:04

## 2024-04-23 RX ADMIN — PROPOFOL 20 MG: 10 INJECTION, EMULSION INTRAVENOUS at 10:04

## 2024-04-23 RX ADMIN — ROCURONIUM BROMIDE 50 MG: 10 INJECTION, SOLUTION INTRAVENOUS at 10:04

## 2024-04-23 RX ADMIN — MIDAZOLAM HYDROCHLORIDE 2 MG: 1 INJECTION, SOLUTION INTRAMUSCULAR; INTRAVENOUS at 10:04

## 2024-04-23 RX ADMIN — FENTANYL CITRATE 25 MCG: 0.05 INJECTION, SOLUTION INTRAMUSCULAR; INTRAVENOUS at 10:04

## 2024-04-23 RX ADMIN — HYDRALAZINE HYDROCHLORIDE 5 MG: 20 INJECTION, SOLUTION INTRAMUSCULAR; INTRAVENOUS at 03:04

## 2024-04-23 RX ADMIN — HYDRALAZINE HYDROCHLORIDE 5 MG: 20 INJECTION, SOLUTION INTRAMUSCULAR; INTRAVENOUS at 02:04

## 2024-04-23 RX ADMIN — HEPARIN SODIUM 2000 UNITS: 1000 INJECTION, SOLUTION INTRAVENOUS; SUBCUTANEOUS at 12:04

## 2024-04-23 RX ADMIN — CEFAZOLIN 2 G: 330 INJECTION, POWDER, FOR SOLUTION INTRAMUSCULAR; INTRAVENOUS at 11:04

## 2024-04-23 RX ADMIN — FENTANYL CITRATE 25 MCG: 50 INJECTION INTRAMUSCULAR; INTRAVENOUS at 02:04

## 2024-04-23 RX ADMIN — OXYCODONE 5 MG: 5 TABLET ORAL at 03:04

## 2024-04-23 RX ADMIN — LIDOCAINE HYDROCHLORIDE 100 MG: 20 INJECTION INTRAVENOUS at 11:04

## 2024-04-23 RX ADMIN — LIDOCAINE HYDROCHLORIDE ANHYDROUS AND DEXTROSE MONOHYDRATE 0.03 MG/KG/MIN: .8; 5 INJECTION, SOLUTION INTRAVENOUS at 11:04

## 2024-04-23 RX ADMIN — SODIUM CHLORIDE, SODIUM GLUCONATE, SODIUM ACETATE, POTASSIUM CHLORIDE, MAGNESIUM CHLORIDE, SODIUM PHOSPHATE, DIBASIC, AND POTASSIUM PHOSPHATE: .53; .5; .37; .037; .03; .012; .00082 INJECTION, SOLUTION INTRAVENOUS at 11:04

## 2024-04-23 RX ADMIN — ONDANSETRON 4 MG: 2 INJECTION INTRAMUSCULAR; INTRAVENOUS at 12:04

## 2024-04-23 RX ADMIN — HEPARIN SODIUM 3000 UNITS: 1000 INJECTION, SOLUTION INTRAVENOUS; SUBCUTANEOUS at 11:04

## 2024-04-23 RX ADMIN — HEPARIN SODIUM 5000 UNITS: 1000 INJECTION, SOLUTION INTRAVENOUS; SUBCUTANEOUS at 11:04

## 2024-04-23 RX ADMIN — HYDRALAZINE HYDROCHLORIDE 5 MG: 20 INJECTION, SOLUTION INTRAMUSCULAR; INTRAVENOUS at 01:04

## 2024-04-23 RX ADMIN — HYDROCHLOROTHIAZIDE 25 MG: 25 TABLET ORAL at 03:04

## 2024-04-23 RX ADMIN — PROTAMINE SULFATE 50 MG: 10 INJECTION, SOLUTION INTRAVENOUS at 12:04

## 2024-04-23 RX ADMIN — FENTANYL CITRATE 50 MCG: 0.05 INJECTION, SOLUTION INTRAMUSCULAR; INTRAVENOUS at 10:04

## 2024-04-23 RX ADMIN — NICARDIPINE HYDROCHLORIDE 400 MCG: 25 INJECTION, SOLUTION INTRAVENOUS at 01:04

## 2024-04-23 RX ADMIN — ROCURONIUM BROMIDE 20 MG: 10 INJECTION, SOLUTION INTRAVENOUS at 11:04

## 2024-04-23 RX ADMIN — ROCURONIUM BROMIDE 30 MG: 10 INJECTION, SOLUTION INTRAVENOUS at 12:04

## 2024-04-23 RX ADMIN — METOPROLOL SUCCINATE 50 MG: 50 TABLET, EXTENDED RELEASE ORAL at 08:04

## 2024-04-23 RX ADMIN — ACETAMINOPHEN 1000 MG: 500 TABLET ORAL at 02:04

## 2024-04-23 NOTE — Clinical Note
pulmonary artery. An angiography was performed of the MPA. The angiography was performed via power injection. The injected amount was 36 mL contrast at 14 mL/s. The PSI from the power injection was 900.

## 2024-04-23 NOTE — DISCHARGE INSTRUCTIONS
AFTER THE PROCEDURE:  You may remove the bandage in 24 hours and wash with soap and water.  You may shower, but do not soak in a tub for three days.     PRECAUTIONS FOR THE NEXT 24 HOURS:  If you need to cough, sneeze, have a bowel movement, or bear down, hold pressure over your bandage.  Do not  anything heavier than a gallon of milk(about 5 pounds)  Avoid excessive bending over.    SYMPTOMS TO WATCH FOR AND REPORT TO YOUR DOCTOR:  BLEEDING: hold pressure over the site until bleeding stops. Proceed to Emergency Room by ambulance (do not drive yourself) if unable to stop bleeding. Notify your doctor.  HEMATOMA (hard bruise under the skin): Freddy around the bruise if one develops. Call your doctor if it increases in size or if you have difficulty talking, swallowing, breathing or anything unusual.  SIGNS OF INFECTION: Fever (temperature over 100.5 F), pus or redness  RASH  CHEST PAIN OR SHORTNESS OF BREATH    You may call the Pediatric Cardiology Service doctor on call at [phone].

## 2024-04-23 NOTE — H&P
[facility] - Short Stay Cardiac Unit  Pediatric Cardiology  History and Physical     Patient Name: eMr Priest  MRN: [id number]  Admission Date: 4/23/2024  Code Status: No Order   Attending Provider: Lady Pfeiffer MD  Primary Cardiologist: Dr. Roberts  Primary Care Physician: Venecia Smith MD  Principal Problem:<principal problem not specified>    Subjective:     Chief Complaint:  Free pulmonary insufficiency     HPI:  49 yr old female with repaired tetralogy of fallot and free pulmonary insufficiency.    Past Medical History:   Diagnosis Date    Benign paroxysmal positional vertigo 7/25/2023    GERD without esophagitis 7/25/2023    Heart disease     Hypertension     Syphilis 11/2020       Past Surgical History:   Procedure Laterality Date    BILATERAL TUBAL LIGATION      TETRALOGY OF FALLOT REPAIR  1977       Review of patient's allergies indicates:   Allergen Reactions    Ace inhibitors Swelling    Lisinopril Other (See Comments)       Current Facility-Administered Medications   Medication Dose Route Frequency Provider Last Rate Last Admin    acetaminophen tablet 1,000 mg  1,000 mg Oral Q6H PRN Colton Lu MD        dexmedetomidine (PRECEDEX) 400mcg/100mL 0.9% NaCL infusion  0-1.4 mcg/kg/hr Intravenous Continuous Colton Lu MD         Family History       Problem Relation (Age of Onset)    Ovarian cancer Cousin    Prostate cancer Father          Social History     Social History Narrative    Not on file     Review of Systems   All other systems reviewed and are negative.    Objective:     Vital Signs (Most Recent):  Temp: 98.8 °F (37.1 °C) (04/23/24 0827)  Pulse: 75 (04/23/24 0827)  Resp: (!) 24 (04/23/24 0827)  BP: (!) 145/80 (04/23/24 0827)  SpO2: 97 % (04/23/24 0827) Vital Signs (24h Range):  Temp:  [98.8 °F (37.1 °C)] 98.8 °F (37.1 °C)  Pulse:  [75] 75  Resp:  [24] 24  SpO2:  [97 %] 97 %  BP: (145)/(80) 145/80     Weight: 98 kg (216 lb 0.8 oz)  Body mass index is 32.85 kg/m².    SpO2: 97  %       No intake or output data in the 24 hours ending 04/23/24 1020    Lines/Drains/Airways       Peripheral Intravenous Line  Duration                  Peripheral IV - Single Lumen 01/31/23 0733 24 G Left Hand 448 days         Peripheral IV - Single Lumen 04/23/24 0851 20 G Left Hand <1 day                       Physical Exam  Constitutional:       Appearance: Normal appearance.   HENT:      Head: Normocephalic.      Nose: Nose normal.      Mouth/Throat:      Mouth: Mucous membranes are moist.   Cardiovascular:      Rate and Rhythm: Normal rate.      Heart sounds: Murmur heard.   Pulmonary:      Effort: Pulmonary effort is normal.      Breath sounds: Normal breath sounds.   Abdominal:      Palpations: Abdomen is soft.   Musculoskeletal:         General: Normal range of motion.      Cervical back: Normal range of motion and neck supple.   Skin:     General: Skin is warm.      Capillary Refill: Capillary refill takes less than 2 seconds.   Neurological:      General: No focal deficit present.      Mental Status: She is alert.   Psychiatric:         Mood and Affect: Mood normal.            Significant Labs: BMP:   Glucose (mg/dL)   Date/Time Value Status   04/23/2024 07:51 AM 90 Final     Sodium (mmol/L)   Date/Time Value Status   04/23/2024 07:51  Final     Potassium (mmol/L)   Date/Time Value Status   04/23/2024 07:51 AM 3.9 Final     Chloride (mmol/L)   Date/Time Value Status   04/23/2024 07:51  Final     CO2 (mmol/L)   Date/Time Value Status   04/23/2024 07:51 AM 23 Final     BUN (mg/dL)   Date/Time Value Status   04/23/2024 07:51 AM 8 Final     Creatinine (mg/dL)   Date/Time Value Status   04/23/2024 07:51 AM 0.8 Final     Calcium (mg/dL)   Date/Time Value Status   04/23/2024 07:51 AM 9.6 Final     Magnesium (mg/dL)   Date/Time Value Status   07/21/2023 10:35 AM 2.0 Final    and CBC   WBC (K/uL)   Date/Time Value Status   04/23/2024 07:51 AM 9.51 Final     Hemoglobin (g/dL)   Date/Time Value Status    04/23/2024 07:51 AM 13.3 Final     Hematocrit (%)   Date/Time Value Status   04/23/2024 07:51 AM 41.8 Final     MCV (fL)   Date/Time Value Status   04/23/2024 07:51 AM 90 Final     Platelets (K/uL)   Date/Time Value Status   04/23/2024 07:51  Final       Significant Imaging:  None  Assessment and Plan:     Cardiac/Vascular  S/P repair of tetralogy of Fallot  49 yr old female with repaired tetralogy of fallot and free pulmonary insufficiency.    Plan:    To cath lab for right/left heart cath and possible Harmonary valve implantation.    Lady Pfeiffer III, MD  Pediatric Cardiology  Interventional Cardiology  [facility] [address] [place] [address]          Lady Pfeiffer MD  Pediatric Cardiology   [facility] - Short Stay Cardiac Unit

## 2024-04-23 NOTE — ANESTHESIA PROCEDURE NOTES
Intubation    Date/Time: 4/23/2024 10:47 AM    Performed by: Vaughn Cárdenas CRNA  Authorized by: Colton Lu MD    Intubation:     Induction:  Intravenous    Intubated:  Postinduction    Mask Ventilation:  Easy mask    Attempts:  1    Attempted By:  CRNA    Method of Intubation:  Video laryngoscopy    Blade:  Funk 3    Laryngeal View Grade: Grade I - full view of cords      Difficult Airway Encountered?: No      Complications:  None    Airway Device:  Oral endotracheal tube    Airway Device Size:  7.5    Style/Cuff Inflation:  Cuffed (inflated to minimal occlusive pressure)    Tube secured:  22    Secured at:  The lips    Placement Verified By:  Capnometry    Complicating Factors:  None    Findings Post-Intubation:  BS equal bilateral and atraumatic/condition of teeth unchanged

## 2024-04-23 NOTE — TRANSFER OF CARE
"Anesthesia Transfer of Care Note    Patient: Mer Priest    Procedure(s) Performed: Procedure(s) (LRB):  Catheterization, Heart, Combined Right and Retrograde Left, for Congenital Heart Defect (N/A)  Replace, Pulmonary, Valve, Pediatric (N/A)  Angiogram, Pulmonary Arteries  ICE, Performed    Patient location: PACU    Anesthesia Type: general    Transport from OR: Transported from OR on 6-10 L/min O2 by face mask with adequate spontaneous ventilation    Post pain: adequate analgesia    Post assessment: tolerated procedure well and no apparent anesthetic complications    Post vital signs: stable    Level of consciousness: awake    Nausea/Vomiting: no nausea/vomiting    Complications: none    Transfer of care protocol was followed      Last vitals: Visit Vitals  BP (!) 145/80 (BP Location: Left arm, Patient Position: Lying)   Pulse 75   Temp 37.1 °C (98.8 °F) (Temporal)   Resp (!) 24   Ht 5' 8" (1.727 m)   Wt 98 kg (216 lb 0.8 oz)   LMP 01/01/2024 (Approximate)   SpO2 97%   Breastfeeding No   BMI 32.85 kg/m²   BP initially 172/81; Cardene given, repeated /68; Dr. Lu aware  "

## 2024-04-23 NOTE — Clinical Note
108 ml of contrast were injected throughout the case. 92 mL of contrast was the total wasted during the case. 200 mL was the total amount used during the case.

## 2024-04-23 NOTE — ANESTHESIA PREPROCEDURE EVALUATION
04/23/2024  Mer Priest is a 49 y.o., female c Hx/o TOF s/p repair presenting for perc PVR. Appropriately NPO and in regular state of health. Appropriately to proceed.      12/2023 TTE  CONGENITAL CARDIAC HISTORY:  Tetralogy of Fallot  S/P Repair at [age] years old -  Dr Carroll at [facility].     SEGMENTAL CARDIAC CONNECTIONS:  Abdominal situs solitus.   Atrial situs solitus.   Atrioventricular alignment is concordant.   D-loop ventricles.   Normal tricuspid valve with large annulus..   The mitral valve appears grossly normal.   There is severe dilation of the right ventricle.   The left ventricle appears qualitatively normal.   The ventriculoarterial alignment is concordant.   No functional pulmonary valve identified.   Normal size aortic valve annulus with structure of the valve not well defined.  Cardiac position is levocardia.   There is a left aortic arch with additional branches not well demonstrated.   No coarctation is demonstrated.         IMPRESSION:  Single ectopic beats with change in QRS noted during this study.  Echodensity consistent with patch repair of atrial septal defect residual shunt demonstrated.  Qualitative impression of at least moderately dilated right atrium.  Large tricuspid valve annulus with no evidence of stenosis and only trivial insufficiency appreciated..  Qualitative impression of severely dilated right ventricle (appears to form apex of the heart) with preserved systolic function.  Echodensity consistent with patch repair of ventricular septal defect and malalignment of the ventricular septum with no residual shunt demonstrated.  Appears to have transannular right ventricular outflow tract patch and no functional pulmonary valve identified.  Peak velocity across the right ventricular outflow tract and pulmonary artery < 2 m/sec with unrestricted insufficiency.  No  significant stenosis is demonstrated in 2D and color Doppler imaging within the pulmonary artery.  Pulmonary artery branches are not well demonstrated in this study.  The left atrial volume index is normal measuring 30 ml/m2.   Qualitative impression of normal mitral valve structure with trivial insufficiency by color Doppler.  Qualitative impression of normal left ventricular size and structure.  Paradoxical septal motion with good movement of the LV free wall, SF = 36% and EF estimated 55 -60% from apical views.   The structure of the aortic valve is not well demonstrated with no evidence of stenosis and a trivial jet of insufficiency.  Aortic dimensions:  Sinuses of Valsalva  = 35 mm.  ST junction              = 28 mm.  Ascending aorta      = 30  mm.  Normal left aortic arch branching pattern with no evidence of coarctation.  No pericardial effusion.    Pre-operative evaluation for Procedure(s) (LRB):  Catheterization, Heart, Combined Right and Retrograde Left, for Congenital Heart Defect (N/A)  Replace, Pulmonary, Valve, Pediatric (N/A)    Patient Active Problem List   Diagnosis    Iron deficiency anemia due to chronic blood loss    Cardiac murmur    Class 1 obesity due to excess calories without serious comorbidity with body mass index (BMI) of 31.0 to 31.9 in adult    Hypertension    Pulmonary insufficiency    RBBB    S/P repair of tetralogy of Fallot    Benign paroxysmal positional vertigo    GERD without esophagitis    Obstructive sleep apnea    Bilateral carpal tunnel syndrome    Current moderate episode of major depressive disorder without prior episode    Obesity with body mass index 30 or greater    Nonrheumatic pulmonary valve insufficiency    Right ventricular enlargement    Adult congenital heart disease    Menorrhagia with irregular cycle            Peripheral IV - Single Lumen 01/31/23 0733 24 G Left Hand (Active)   Number of days: 448            Peripheral IV - Single Lumen 04/23/24 0851 20 G Left  Hand (Active)   Number of days: 0       Medications Prior to Admission   Medication Sig Dispense Refill Last Dose    amLODIPine (NORVASC) 5 MG tablet Take 1 tablet by mouth every day 90 tablet 3 4/22/2024 at 2100    benzonatate (TESSALON) 200 MG capsule TAKE 1 CAPSULE (ORAL) 3 TIMES PER DAY AS NEEDED FOR COUGH DO NOT CUT, BITE, CRUSH OR CHEW.   Past Week    cetirizine (ZYRTEC) 10 MG tablet Take 1 tablet (10 mg total) by mouth daily as needed for Rhinitis. 30 tablet 0 Past Month    hydroCHLOROthiazide (HYDRODIURIL) 25 MG tablet Take 1 tablet (25 mg total) by mouth once daily. 90 tablet 1 Past Week    metoprolol succinate (TOPROL-XL) 50 MG 24 hr tablet Take 1 tablet (50 mg total) by mouth 2 (two) times daily. (Patient taking differently: Take 50 mg by mouth once daily.) 180 tablet 3 4/22/2024    blood sugar diagnostic Strp To check BG 3 times daily, to use with insurance preferred meter 100 strip 11     blood-glucose meter kit To check BG 3 times daily, to use with insurance preferred meter 1 each 0     DULoxetine (CYMBALTA) 30 MG capsule Take 1 capsule (30 mg total) by mouth once daily. 30 capsule 11     ergocalciferol (ERGOCALCIFEROL) 50,000 unit Cap Take 1 capsule (50,000 Units total) by mouth every 7 days. 12 capsule 1     gabapentin (NEURONTIN) 300 MG capsule Take 1 capsule (300 mg total) by mouth 3 (three) times daily as needed. 90 capsule 1 More than a month    lancets Misc To check BG 3 times daily, to use with insurance preferred meter 100 each 11     levocetirizine (XYZAL) 5 MG tablet Take 1 tablet (5 mg total) by mouth every evening. 30 tablet 11     meclizine (ANTIVERT) 25 mg tablet Take 1 tablet (25 mg total) by mouth 3 (three) times daily as needed for Dizziness. 30 tablet 0 More than a month       Review of patient's allergies indicates:   Allergen Reactions    Ace inhibitors Swelling    Lisinopril Other (See Comments)       Past Medical History:   Diagnosis Date    Benign paroxysmal positional vertigo  "2023    GERD without esophagitis 2023    Heart disease     Hypertension     Syphilis 2020     Past Surgical History:   Procedure Laterality Date    BILATERAL TUBAL LIGATION      TETRALOGY OF FALLOT REPAIR       Tobacco Use    Smoking status: Former     Current packs/day: 0.00     Types: Cigarettes     Quit date: 2022     Years since quittin.9    Smokeless tobacco: Never   Substance and Sexual Activity    Alcohol use: Not Currently    Drug use: Never    Sexual activity: Not Currently       Objective:     Vital Signs (Most Recent):  Temp: 37.1 °C (98.8 °F) (24)  Pulse: 75 (24)  Resp: (!) 24 (24)  BP: (!) 145/80 (24)  SpO2: 97 % (24) Vital Signs (24h Range):  Temp:  [37.1 °C (98.8 °F)] 37.1 °C (98.8 °F)  Pulse:  [75] 75  Resp:  [24] 24  SpO2:  [97 %] 97 %  BP: (145)/(80) 145/80     Weight: 98 kg (216 lb 0.8 oz)  Body mass index is 32.85 kg/m².        Significant Labs:  All pertinent labs from the last 24 hours have been reviewed.    CBC: No results for input(s): "WBC", "RBC", "HGB", "HCT", "PLT", "MCV", "MCH", "MCHC" in the last 72 hours.    CMP: No results for input(s): "NA", "K", "CL", "CO2", "BUN", "CREATININE", "GLU", "MG", "PHOS", "CALCIUM", "ALBUMIN", "PROT", "ALKPHOS", "ALT", "AST", "BILITOT" in the last 72 hours.    INR  No results for input(s): "PT", "INR", "PROTIME", "APTT" in the last 72 hours.      Pre-op Assessment    I have reviewed the Patient Summary Reports.     I have reviewed the Nursing Notes. I have reviewed the NPO Status.   I have reviewed the Medications.     Review of Systems  Anesthesia Hx:             Denies Family Hx of Anesthesia complications.    Denies Personal Hx of Anesthesia complications.                    Cardiovascular:     Hypertension    Dysrhythmias                              Hypertension     Disorder of Cardiac Rhythm     Pulmonary:        Sleep Apnea     Obstructive Sleep Apnea (DARLING).         "   Hepatic/GI:     GERD      Gerd          Neurological:    Neuromuscular Disease,                                 Neuromuscular Disease   Psych:  Psychiatric History                  Physical Exam  General: Well nourished    Airway:  Mallampati: II   Mouth Opening: Normal  TM Distance: Normal  Tongue: Normal  Neck ROM: Normal ROM    Dental:  Any loose and/or missing teeth verified with patient   Chest/Lungs:  Clear to auscultation    Heart:  Rate: Normal  Rhythm: Regular Rhythm  Sounds: Normal    Abdomen:  Normal        Anesthesia Plan  Type of Anesthesia, risks & benefits discussed:    Anesthesia Type: Gen ETT, Gen Supraglottic Airway, Gen Natural Airway, MAC  Intra-op Monitoring Plan: Standard ASA Monitors  Post Op Pain Control Plan: multimodal analgesia and IV/PO Opioids PRN  Induction:  IV  Informed Consent: Informed consent signed with the Patient and all parties understand the risks and agree with anesthesia plan.  All questions answered.   ASA Score: 3    Ready For Surgery From Anesthesia Perspective.     .

## 2024-04-23 NOTE — INTERVAL H&P NOTE
The patient has been examined and the H&P has been reviewed:    I concur with the findings and no changes have occurred since H&P was written.    Anesthesia/Surgery/catheterization/PPVI risks, benefits and alternative options discussed and understood by patient/family.          Active Hospital Problems    Diagnosis  POA    S/P repair of tetralogy of Fallot [Z87.74]  Not Applicable      Resolved Hospital Problems   No resolved problems to display.

## 2024-04-23 NOTE — PLAN OF CARE
Patient remains sedated following cath procedure. Cath site dressings CDI with 2+ pulses in feet. Skin warm with CRT 2-3 seconds. Plan to remain flat until 17:30 and transfer to CSU once criteria is met. Caregivers at bedside. Plan of care reviewed and questions answered.

## 2024-04-23 NOTE — NURSING
Patient arrived to the unit with nurse at bedside. Patient stable. Bedrest ended. Patient was able to transfer from stretcher to the bed, by sliding her request to not walk. Patient with no complaints of pain or discomfort. VSS.

## 2024-04-23 NOTE — ASSESSMENT & PLAN NOTE
49 yr old female with repaired tetralogy of fallot and free pulmonary insufficiency.    Plan:    To cath lab for right/left heart cath and possible Harmonary valve implantation.    Lady Pfeiffer III, MD  Pediatric Cardiology  Interventional Cardiology  [facility] [address] [place] [address]

## 2024-04-23 NOTE — OR NURSING
Pt wants to know if she should still be taking \"losartan\" due to recall on medication. Best contact is [phone].        Message received & copied from [facility] Pt. Complained of back pain due to inability to move during bedrest period. BP elevated. Padmini OSCAR notified. Hydralazine x 4, Oxy x 2, Tylenol x1, Fentanyl, x 1, Amolodipine and HCTZ restarted, position adjusted with minimal relief until patient flat time complete. Pain relieved with movement.

## 2024-04-23 NOTE — Clinical Note
An angiography was performed of the MPA. The angiography was performed via power injection. The injected amount was 36 mL contrast at 14 mL/s. The PSI from the power injection was 900.

## 2024-04-23 NOTE — PROCEDURE NOTE ADDENDUM
Certification of Assistant at Surgery       Surgery Date: 4/23/2024     Participating Surgeons:  Surgeons and Role:     * Lady Pfeiffer III., MD - Primary     * Manuel Sanchez Jr., MD - Assisting    Procedures:  Procedure(s) (LRB):  Catheterization, Heart, Combined Right and Retrograde Left, for Congenital Heart Defect (N/A)  Replace, Pulmonary, Valve, Pediatric (N/A)  Angiogram, Pulmonary Arteries  ICE, Performed    Assistant Surgeon's Certification of Necessity:  I understand that section 1842 (b) (6) (d) of the Social Security Act generally prohibits Medicare Part B reasonable charge payment for the services of assistants at surgery in teaching hospitals when qualified residents are available to furnish such services. I certify that the services for which payment is claimed were medically necessary, and that no qualified resident was available to perform the services. I further understand that these services are subject to post-payment review by the Medicare carrier.      Manuel Sanchez MD    04/23/2024  12:53 PM

## 2024-04-23 NOTE — OR NURSING
Earleneintera noted per monitor when patient asleep. See monitor strip. MD notified. VSS. Spontaneously resolved. Continue to monitor.

## 2024-04-23 NOTE — SUBJECTIVE & OBJECTIVE
Past Medical History:   Diagnosis Date    Benign paroxysmal positional vertigo 7/25/2023    GERD without esophagitis 7/25/2023    Heart disease     Hypertension     Syphilis 11/2020       Past Surgical History:   Procedure Laterality Date    BILATERAL TUBAL LIGATION      TETRALOGY OF FALLOT REPAIR  1977       Review of patient's allergies indicates:   Allergen Reactions    Ace inhibitors Swelling    Lisinopril Other (See Comments)       Current Facility-Administered Medications   Medication Dose Route Frequency Provider Last Rate Last Admin    acetaminophen tablet 1,000 mg  1,000 mg Oral Q6H PRN Colton Lu MD        dexmedetomidine (PRECEDEX) 400mcg/100mL 0.9% NaCL infusion  0-1.4 mcg/kg/hr Intravenous Continuous Colton Lu MD         Family History       Problem Relation (Age of Onset)    Ovarian cancer Cousin    Prostate cancer Father          Social History     Social History Narrative    Not on file     Review of Systems   All other systems reviewed and are negative.    Objective:     Vital Signs (Most Recent):  Temp: 98.8 °F (37.1 °C) (04/23/24 0827)  Pulse: 75 (04/23/24 0827)  Resp: (!) 24 (04/23/24 0827)  BP: (!) 145/80 (04/23/24 0827)  SpO2: 97 % (04/23/24 0827) Vital Signs (24h Range):  Temp:  [98.8 °F (37.1 °C)] 98.8 °F (37.1 °C)  Pulse:  [75] 75  Resp:  [24] 24  SpO2:  [97 %] 97 %  BP: (145)/(80) 145/80     Weight: 98 kg (216 lb 0.8 oz)  Body mass index is 32.85 kg/m².    SpO2: 97 %       No intake or output data in the 24 hours ending 04/23/24 1020    Lines/Drains/Airways       Peripheral Intravenous Line  Duration                  Peripheral IV - Single Lumen 01/31/23 0733 24 G Left Hand 448 days         Peripheral IV - Single Lumen 04/23/24 0851 20 G Left Hand <1 day                       Physical Exam  Constitutional:       Appearance: Normal appearance.   HENT:      Head: Normocephalic.      Nose: Nose normal.      Mouth/Throat:      Mouth: Mucous membranes are moist.   Cardiovascular:       Rate and Rhythm: Normal rate.      Heart sounds: Murmur heard.   Pulmonary:      Effort: Pulmonary effort is normal.      Breath sounds: Normal breath sounds.   Abdominal:      Palpations: Abdomen is soft.   Musculoskeletal:         General: Normal range of motion.      Cervical back: Normal range of motion and neck supple.   Skin:     General: Skin is warm.      Capillary Refill: Capillary refill takes less than 2 seconds.   Neurological:      General: No focal deficit present.      Mental Status: She is alert.   Psychiatric:         Mood and Affect: Mood normal.            Significant Labs: BMP:   Glucose (mg/dL)   Date/Time Value Status   04/23/2024 07:51 AM 90 Final     Sodium (mmol/L)   Date/Time Value Status   04/23/2024 07:51  Final     Potassium (mmol/L)   Date/Time Value Status   04/23/2024 07:51 AM 3.9 Final     Chloride (mmol/L)   Date/Time Value Status   04/23/2024 07:51  Final     CO2 (mmol/L)   Date/Time Value Status   04/23/2024 07:51 AM 23 Final     BUN (mg/dL)   Date/Time Value Status   04/23/2024 07:51 AM 8 Final     Creatinine (mg/dL)   Date/Time Value Status   04/23/2024 07:51 AM 0.8 Final     Calcium (mg/dL)   Date/Time Value Status   04/23/2024 07:51 AM 9.6 Final     Magnesium (mg/dL)   Date/Time Value Status   07/21/2023 10:35 AM 2.0 Final    and CBC   WBC (K/uL)   Date/Time Value Status   04/23/2024 07:51 AM 9.51 Final     Hemoglobin (g/dL)   Date/Time Value Status   04/23/2024 07:51 AM 13.3 Final     Hematocrit (%)   Date/Time Value Status   04/23/2024 07:51 AM 41.8 Final     MCV (fL)   Date/Time Value Status   04/23/2024 07:51 AM 90 Final     Platelets (K/uL)   Date/Time Value Status   04/23/2024 07:51  Final       Significant Imaging:  None

## 2024-04-23 NOTE — NURSING TRANSFER
Nursing Transfer Note    Sending Transfer Note      4/23/2024 6:40 PM  Transfer via stretcher  From [facility] to    Transfered with cardiac monitoring, bag/mask O2  Transported by: JASWINDER Wells RN, OZIEL Pan RN  Report given as documented in PER Handoff on Doc Flowsheet  VS's per Doc Flowsheet  Medicines sent: No  Chart sent with patient: Yes  What caregiver / guardian was Notified of transfer: Friend  JASWINDER Wells, SENAIT  4/23/2024 6:48 PM

## 2024-04-24 LAB
ASCENDING AORTA: 3.32 CM
AV INDEX (PROSTH): 0.86
AV MEAN GRADIENT: 3 MMHG
AV PEAK GRADIENT: 7 MMHG
AV VALVE AREA BY VELOCITY RATIO: 3.01 CM²
AV VALVE AREA: 3.22 CM²
AV VELOCITY RATIO: 0.8
BSA FOR ECHO PROCEDURE: 2.17 M2
CV ECHO LV RWT: 0.37 CM
DOP CALC AO PEAK VEL: 1.3 M/S
DOP CALC AO VTI: 27.88 CM
DOP CALC LVOT AREA: 3.8 CM2
DOP CALC LVOT DIAMETER: 2.19 CM
DOP CALC LVOT PEAK VEL: 1.04 M/S
DOP CALC LVOT STROKE VOLUME: 89.87 CM3
DOP CALC RVOT PEAK VEL: 0.9 M/S
DOP CALC RVOT VTI: 21.48 CM
DOP CALCLVOT PEAK VEL VTI: 23.87 CM
E WAVE DECELERATION TIME: 222.87 MSEC
E/A RATIO: 1.66
E/E' RATIO: 20.92 M/S
ECHO LV POSTERIOR WALL: 0.88 CM (ref 0.6–1.1)
FRACTIONAL SHORTENING: 26 % (ref 28–44)
GLUCOSE SERPL-MCNC: 90 MG/DL (ref 70–110)
HCO3 UR-SCNC: 21.4 MMOL/L (ref 24–28)
HCT VFR BLD CALC: 33 %PCV (ref 36–54)
INTERVENTRICULAR SEPTUM: 0.96 CM (ref 0.6–1.1)
LA MAJOR: 6.3 CM
LA MINOR: 6.28 CM
LA WIDTH: 4.29 CM
LEFT ATRIUM SIZE: 3.5 CM
LEFT ATRIUM VOLUME INDEX MOD: 33.2 ML/M2
LEFT ATRIUM VOLUME INDEX: 38 ML/M2
LEFT ATRIUM VOLUME MOD: 69.98 CM3
LEFT ATRIUM VOLUME: 80.28 CM3
LEFT INTERNAL DIMENSION IN SYSTOLE: 3.53 CM (ref 2.1–4)
LEFT VENTRICLE DIASTOLIC VOLUME INDEX: 50.65 ML/M2
LEFT VENTRICLE DIASTOLIC VOLUME: 106.87 ML
LEFT VENTRICLE MASS INDEX: 72 G/M2
LEFT VENTRICLE SYSTOLIC VOLUME INDEX: 24.6 ML/M2
LEFT VENTRICLE SYSTOLIC VOLUME: 51.89 ML
LEFT VENTRICULAR INTERNAL DIMENSION IN DIASTOLE: 4.79 CM (ref 3.5–6)
LEFT VENTRICULAR MASS: 151.63 G
LV LATERAL E/E' RATIO: 15.11 M/S
LV SEPTAL E/E' RATIO: 34 M/S
MV A" WAVE DURATION": 8.56 MSEC
MV PEAK A VEL: 0.82 M/S
MV PEAK E VEL: 1.36 M/S
MV STENOSIS PRESSURE HALF TIME: 64.63 MS
MV VALVE AREA P 1/2 METHOD: 3.4 CM2
OHS CV RV/LV RATIO: 1.1 CM
PCO2 BLDA: 35.2 MMHG (ref 35–45)
PH SMN: 7.39 [PH] (ref 7.35–7.45)
PISA TR MAX VEL: 3.77 M/S
PO2 BLDA: 64 MMHG (ref 80–100)
POC ACTIVATED CLOTTING TIME K: 206 SEC (ref 74–137)
POC ACTIVATED CLOTTING TIME K: 212 SEC (ref 74–137)
POC ACTIVATED CLOTTING TIME K: 217 SEC (ref 74–137)
POC BE: -4 MMOL/L
POC IONIZED CALCIUM: 1.08 MMOL/L (ref 1.06–1.42)
POC SATURATED O2: 92 % (ref 95–100)
POC TCO2: 22 MMOL/L (ref 23–27)
POTASSIUM BLD-SCNC: 3.4 MMOL/L (ref 3.5–5.1)
PULM VEIN S/D RATIO: 0.6
PV MEAN GRADIENT: 2 MMHG
PV PEAK D VEL: 0.57 M/S
PV PEAK GRADIENT: 19
PV PEAK S VEL: 0.34 M/S
PV PEAK VELOCITY: 2.19 M/S
RA MAJOR: 5.28 CM
RA WIDTH: 4.55 CM
RIGHT VENTRICULAR END-DIASTOLIC DIMENSION: 5.29 CM
SAMPLE: ABNORMAL
SINUS: 3.44 CM
SODIUM BLD-SCNC: 142 MMOL/L (ref 136–145)
STJ: 2.93 CM
TDI LATERAL: 0.09 M/S
TDI SEPTAL: 0.04 M/S
TDI: 0.07 M/S
TR MAX PG: 57 MMHG
TRICUSPID ANNULAR PLANE SYSTOLIC EXCURSION: 1.8 CM
Z-SCORE OF LEFT VENTRICULAR DIMENSION IN END DIASTOLE: -3.22
Z-SCORE OF LEFT VENTRICULAR DIMENSION IN END SYSTOLE: -1.06

## 2024-04-24 PROCEDURE — 25000003 PHARM REV CODE 250: Performed by: STUDENT IN AN ORGANIZED HEALTH CARE EDUCATION/TRAINING PROGRAM

## 2024-04-24 PROCEDURE — 25000003 PHARM REV CODE 250: Performed by: PEDIATRICS

## 2024-04-24 RX ORDER — METOPROLOL SUCCINATE 50 MG/1
50 TABLET, EXTENDED RELEASE ORAL 2 TIMES DAILY
Qty: 180 TABLET | Refills: 3 | Status: SHIPPED | OUTPATIENT
Start: 2024-04-24 | End: 2025-04-24

## 2024-04-24 RX ORDER — NAPROXEN SODIUM 220 MG/1
81 TABLET, FILM COATED ORAL DAILY
Qty: 30 TABLET | Refills: 6 | Status: SHIPPED | OUTPATIENT
Start: 2024-04-24 | End: 2025-04-24

## 2024-04-24 RX ORDER — BENZONATATE 100 MG/1
100 CAPSULE ORAL 3 TIMES DAILY PRN
Status: COMPLETED | OUTPATIENT
Start: 2024-04-24 | End: 2024-04-24

## 2024-04-24 RX ORDER — CLOPIDOGREL BISULFATE 75 MG/1
75 TABLET ORAL DAILY
Qty: 30 TABLET | Refills: 11 | Status: SHIPPED | OUTPATIENT
Start: 2024-04-24 | End: 2025-04-24

## 2024-04-24 RX ADMIN — GABAPENTIN 300 MG: 300 CAPSULE ORAL at 10:04

## 2024-04-24 RX ADMIN — BENZONATATE 100 MG: 100 CAPSULE ORAL at 12:04

## 2024-04-24 RX ADMIN — AMLODIPINE BESYLATE 5 MG: 5 TABLET ORAL at 10:04

## 2024-04-24 RX ADMIN — ACETAMINOPHEN 1000 MG: 500 TABLET ORAL at 07:04

## 2024-04-24 RX ADMIN — OXYCODONE 5 MG: 5 TABLET ORAL at 10:04

## 2024-04-24 RX ADMIN — HYDROCHLOROTHIAZIDE 25 MG: 25 TABLET ORAL at 10:04

## 2024-04-24 RX ADMIN — METOPROLOL SUCCINATE 50 MG: 50 TABLET, EXTENDED RELEASE ORAL at 10:04

## 2024-04-24 NOTE — DISCHARGE SUMMARY
[facility] - Cardiology Stepdown  Discharge Note  Short Stay    Procedure(s) (LRB):  Catheterization, Heart, Combined Right and Retrograde Left, for Congenital Heart Defect (N/A)  Replace, Pulmonary, Valve, Pediatric (N/A)  Angiogram, Pulmonary Arteries  ICE, Performed      OUTCOME: Patient tolerated treatment/procedure well without complication and is now ready for discharge.    DISPOSITION: Home or Self Care    FINAL DIAGNOSIS:  S/P repair of tetralogy of Fallot, percutaneous pulmonary valve implantation    FOLLOWUP: In clinic    DISCHARGE INSTRUCTIONS:    Discharge Procedure Orders   Diet Adult Regular     Notify your health care provider if you experience any of the following:  difficulty breathing or increased cough     Notify your health care provider if you experience any of the following:  redness, tenderness, or signs of infection (pain, swelling, redness, odor or green/yellow discharge around incision site)     Notify your health care provider if you experience any of the following:  severe uncontrolled pain     Notify your health care provider if you experience any of the following:  persistent nausea and vomiting or diarrhea     Notify your health care provider if you experience any of the following:  temperature >100.4     Activity as tolerated         Clinical Reference Documents Added to Patient Instructions         Document    CARDIAC CATHETERIZATION DISCHARGE INSTRUCTIONS (ENGLISH)            TIME SPENT ON DISCHARGE: 30 minutes

## 2024-04-24 NOTE — PLAN OF CARE
Problem: Adult Inpatient Plan of Care  Goal: Plan of Care Review  Outcome: Met  Flowsheets (Taken 4/24/2024 1224)  Plan of Care Reviewed With: patient  Goal: Patient-Specific Goal (Individualized)  Outcome: Met  Goal: Absence of Hospital-Acquired Illness or Injury  Outcome: Met  Intervention: Identify and Manage Fall Risk  Flowsheets (Taken 4/24/2024 1224)  Safety Promotion/Fall Prevention: assistive device/personal item within reach  Intervention: Prevent Skin Injury  Flowsheets (Taken 4/24/2024 1224)  Body Position: position changed independently  Device Skin Pressure Protection: adhesive use limited  Intervention: Prevent and Manage VTE (Venous Thromboembolism) Risk  Flowsheets (Taken 4/24/2024 1224)  VTE Prevention/Management: bleeding risk assessed  Intervention: Prevent Infection  Flowsheets (Taken 4/24/2024 1224)  Infection Prevention: cohorting utilized  Goal: Optimal Comfort and Wellbeing  Outcome: Met  Intervention: Monitor Pain and Promote Comfort  Flowsheets (Taken 4/24/2024 1224)  Pain Management Interventions: care clustered  Intervention: Provide Person-Centered Care  Flowsheets (Taken 4/24/2024 1224)  Trust Relationship/Rapport: care explained  Goal: Readiness for Transition of Care  Outcome: Met     Problem: Wound  Goal: Optimal Coping  Outcome: Met  Intervention: Support Patient and Family Response  Flowsheets (Taken 4/24/2024 1224)  Supportive Measures: active listening utilized  Family/Support System Care: involvement promoted  Goal: Optimal Functional Ability  Outcome: Met  Intervention: Optimize Functional Ability  Flowsheets (Taken 4/24/2024 1224)  Activity Management: Ambulated to bathroom - L4  Activity Assistance Provided: independent  Goal: Absence of Infection Signs and Symptoms  Outcome: Met  Intervention: Prevent or Manage Infection  Flowsheets (Taken 4/24/2024 1224)  Infection Management: aseptic technique maintained  Goal: Improved Oral Intake  Outcome: Met  Intervention: Promote  and Optimize Oral Intake  Flowsheets (Taken 4/24/2024 1224)  Oral Nutrition Promotion: physical activity promoted  Goal: Optimal Pain Control and Function  Outcome: Met  Intervention: Prevent or Manage Pain  Flowsheets (Taken 4/24/2024 1224)  Pain Management Interventions: care clustered  Goal: Skin Health and Integrity  Outcome: Met  Intervention: Optimize Skin Protection  Flowsheets (Taken 4/24/2024 1224)  Activity Management: Ambulated to bathroom - L4  Goal: Optimal Wound Healing  Outcome: Met

## 2024-04-24 NOTE — ANESTHESIA POSTPROCEDURE EVALUATION
Anesthesia Post Evaluation    Patient: Mer Priest    Procedure(s) Performed: Procedure(s) (LRB):  Catheterization, Heart, Combined Right and Retrograde Left, for Congenital Heart Defect (N/A)  Replace, Pulmonary, Valve, Pediatric (N/A)  Angiogram, Pulmonary Arteries  ICE, Performed    Final Anesthesia Type: general      Patient location during evaluation: PACU  Patient participation: Yes- Able to Participate  Level of consciousness: awake and alert  Post-procedure vital signs: reviewed and stable  Pain management: adequate  Airway patency: patent  DARLING mitigation strategies: Extubation while patient is awake  PONV status at discharge: No PONV  Anesthetic complications: no      Cardiovascular status: stable  Respiratory status: spontaneous ventilation and face mask  Hydration status: euvolemic  Follow-up not needed.              Vitals Value Taken Time   /79 04/24/24 0748   Temp 36.3 °C (97.3 °F) 04/24/24 0748   Pulse 72 04/24/24 0748   Resp 16 04/24/24 0748   SpO2 93 % 04/24/24 0748         No case tracking events are documented in the log.      Pain/Raina Score: Presence of Pain: denies (4/23/2024  1:35 PM)  Pain Rating Prior to Med Admin: 4 (4/24/2024  7:18 AM)  Pain Rating Post Med Admin: 3 (4/23/2024  4:30 PM)  Raina Score: 10 (4/23/2024  2:30 PM)

## 2024-04-24 NOTE — CARE UPDATE
I have reviewed the chart of Mer D Rociobatsheva who is hospitalized for the following:    Active Hospital Problems    Diagnosis    *S/P repair of tetralogy of Fallot    Hypertension    Obesity with body mass index 30 or greater        Vickie Pabon NP  [facility]

## 2024-04-25 VITALS
DIASTOLIC BLOOD PRESSURE: 85 MMHG | BODY MASS INDEX: 32.74 KG/M2 | HEIGHT: 68 IN | TEMPERATURE: 98 F | OXYGEN SATURATION: 92 % | RESPIRATION RATE: 16 BRPM | WEIGHT: 216.06 LBS | SYSTOLIC BLOOD PRESSURE: 181 MMHG | HEART RATE: 82 BPM

## 2024-04-26 ENCOUNTER — PATIENT MESSAGE (OUTPATIENT)
Dept: CARDIOLOGY | Facility: CLINIC | Age: 49
End: 2024-04-26
Payer: COMMERCIAL

## 2024-04-26 NOTE — LETTER
May 2, 2024                 [facility] 3rd Fl  [address]  [place] [address]  Phone: [phone]   Patient: Mer Priest   MR Number: [id number]   YOB: 1975   Date of Visit: 4/26/2024     To whom it may concern:    Mer Priest is a 49 y.o. female followed in the [facility].  She is now cleared to return to work with no restrictions.    Sincerely,      Amrik Roberts MD  Pediatric Cardiology  Adult Congenital Heart Disease  Pediatric Heart Failure and Transplantation  [facility]  [address] [place]  [address] [phone]

## 2024-04-27 LAB
BLD PROD TYP BPU: NORMAL
BLD PROD TYP BPU: NORMAL
BLOOD UNIT EXPIRATION DATE: NORMAL
BLOOD UNIT EXPIRATION DATE: NORMAL
BLOOD UNIT TYPE CODE: 6200
BLOOD UNIT TYPE CODE: 6200
BLOOD UNIT TYPE: NORMAL
BLOOD UNIT TYPE: NORMAL
CODING SYSTEM: NORMAL
CODING SYSTEM: NORMAL
CROSSMATCH INTERPRETATION: NORMAL
CROSSMATCH INTERPRETATION: NORMAL
DISPENSE STATUS: NORMAL
DISPENSE STATUS: NORMAL
TRANS ERYTHROCYTES VOL PATIENT: NORMAL ML
TRANS ERYTHROCYTES VOL PATIENT: NORMAL ML

## 2024-05-16 ENCOUNTER — HOSPITAL ENCOUNTER (OUTPATIENT)
Dept: CARDIOLOGY | Facility: CLINIC | Age: 49
Discharge: HOME | End: 2024-05-16
Payer: COMMERCIAL

## 2024-05-16 ENCOUNTER — HOSPITAL ENCOUNTER (OUTPATIENT)
Dept: CARDIOLOGY | Facility: HOSPITAL | Age: 49
Discharge: HOME | End: 2024-05-16
Attending: PEDIATRICS
Payer: COMMERCIAL

## 2024-05-16 ENCOUNTER — OFFICE VISIT (OUTPATIENT)
Dept: CARDIOLOGY | Facility: CLINIC | Age: 49
End: 2024-05-16
Payer: COMMERCIAL

## 2024-05-16 VITALS
SYSTOLIC BLOOD PRESSURE: 132 MMHG | BODY MASS INDEX: 31.49 KG/M2 | HEART RATE: 74 BPM | WEIGHT: 207.81 LBS | DIASTOLIC BLOOD PRESSURE: 68 MMHG | HEIGHT: 68 IN

## 2024-05-16 VITALS
BODY MASS INDEX: 31.81 KG/M2 | OXYGEN SATURATION: 95 % | WEIGHT: 209.88 LBS | SYSTOLIC BLOOD PRESSURE: 140 MMHG | HEART RATE: 70 BPM | HEIGHT: 68 IN | DIASTOLIC BLOOD PRESSURE: 73 MMHG

## 2024-05-16 DIAGNOSIS — I37.1 NONRHEUMATIC PULMONARY VALVE INSUFFICIENCY: ICD-10-CM

## 2024-05-16 DIAGNOSIS — Z95.4 S/P TRANSCATHETER REPLACEMENT OF PULMONARY VALVE: ICD-10-CM

## 2024-05-16 DIAGNOSIS — Q24.9 ADULT CONGENITAL HEART DISEASE: ICD-10-CM

## 2024-05-16 DIAGNOSIS — I45.10 RBBB: ICD-10-CM

## 2024-05-16 DIAGNOSIS — Z87.74 S/P REPAIR OF TETRALOGY OF FALLOT: ICD-10-CM

## 2024-05-16 DIAGNOSIS — J98.4 PULMONARY INSUFFICIENCY: ICD-10-CM

## 2024-05-16 DIAGNOSIS — Z87.74 S/P REPAIR OF TETRALOGY OF FALLOT: Primary | ICD-10-CM

## 2024-05-16 DIAGNOSIS — I51.7 RIGHT VENTRICULAR ENLARGEMENT: ICD-10-CM

## 2024-05-16 PROBLEM — R01.1 CARDIAC MURMUR: Status: RESOLVED | Noted: 2018-11-16 | Resolved: 2024-05-16

## 2024-05-16 LAB
ASCENDING AORTA: 3.18 CM
AV INDEX (PROSTH): 1.04
AV MEAN GRADIENT: 2 MMHG
AV PEAK GRADIENT: 5 MMHG
AV VALVE AREA BY VELOCITY RATIO: 4.08 CM²
AV VALVE AREA: 4.36 CM²
AV VELOCITY RATIO: 0.97
BSA FOR ECHO PROCEDURE: 2.13 M2
CV ECHO LV RWT: 0.33 CM
DOP CALC AO PEAK VEL: 1.13 M/S
DOP CALC AO VTI: 25.79 CM
DOP CALC LVOT AREA: 4.2 CM2
DOP CALC LVOT DIAMETER: 2.31 CM
DOP CALC LVOT PEAK VEL: 1.1 M/S
DOP CALC LVOT STROKE VOLUME: 112.39 CM3
DOP CALC RVOT PEAK VEL: 0.72 M/S
DOP CALC RVOT VTI: 18.5 CM
DOP CALCLVOT PEAK VEL VTI: 26.83 CM
E WAVE DECELERATION TIME: 186.74 MSEC
E/A RATIO: 1.75
E/E' RATIO: 24.2 M/S
ECHO LV POSTERIOR WALL: 0.83 CM (ref 0.6–1.1)
FRACTIONAL SHORTENING: 31 % (ref 28–44)
INTERVENTRICULAR SEPTUM: 0.82 CM (ref 0.6–1.1)
LA MAJOR: 5.98 CM
LA MINOR: 6.17 CM
LA WIDTH: 5.1 CM
LEFT ATRIUM SIZE: 5.27 CM
LEFT ATRIUM VOLUME INDEX MOD: 34.4 ML/M2
LEFT ATRIUM VOLUME INDEX: 66.7 ML/M2
LEFT ATRIUM VOLUME MOD: 71.5 CM3
LEFT ATRIUM VOLUME: 138.75 CM3
LEFT INTERNAL DIMENSION IN SYSTOLE: 3.52 CM (ref 2.1–4)
LEFT VENTRICLE DIASTOLIC VOLUME INDEX: 59.58 ML/M2
LEFT VENTRICLE DIASTOLIC VOLUME: 123.93 ML
LEFT VENTRICLE MASS INDEX: 70 G/M2
LEFT VENTRICLE SYSTOLIC VOLUME INDEX: 24.8 ML/M2
LEFT VENTRICLE SYSTOLIC VOLUME: 51.66 ML
LEFT VENTRICULAR INTERNAL DIMENSION IN DIASTOLE: 5.1 CM (ref 3.5–6)
LEFT VENTRICULAR MASS: 146.11 G
LV LATERAL E/E' RATIO: 24.2 M/S
LV SEPTAL E/E' RATIO: 24.2 M/S
MV A" WAVE DURATION": 10.66 MSEC
MV PEAK A VEL: 0.69 M/S
MV PEAK E VEL: 1.21 M/S
OHS CV RV/LV RATIO: 1.23 CM
OHS QRS DURATION: 168 MS
OHS QTC CALCULATION: 505 MS
PISA TR MAX VEL: 3.3 M/S
PULM VEIN S/D RATIO: 0.57
PV MEAN GRADIENT: 1 MMHG
PV PEAK D VEL: 0.84 M/S
PV PEAK GRADIENT: 18
PV PEAK S VEL: 0.48 M/S
PV PEAK VELOCITY: 2.14 M/S
RA MAJOR: 5.22 CM
RA WIDTH: 4.6 CM
RIGHT VENTRICULAR END-DIASTOLIC DIMENSION: 6.25 CM
SINUS: 3.58 CM
STJ: 3.02 CM
TDI LATERAL: 0.05 M/S
TDI SEPTAL: 0.05 M/S
TDI: 0.05 M/S
TR MAX PG: 44 MMHG
TRICUSPID ANNULAR PLANE SYSTOLIC EXCURSION: 1.69 CM
Z-SCORE OF LEFT VENTRICULAR DIMENSION IN END DIASTOLE: -2.22
Z-SCORE OF LEFT VENTRICULAR DIMENSION IN END SYSTOLE: -0.8

## 2024-05-16 PROCEDURE — 93010 ELECTROCARDIOGRAM REPORT: CPT | Mod: S$GLB,,, | Performed by: INTERNAL MEDICINE

## 2024-05-16 PROCEDURE — 3077F SYST BP >= 140 MM HG: CPT | Mod: CPTII,S$GLB,, | Performed by: PEDIATRICS

## 2024-05-16 PROCEDURE — 3078F DIAST BP <80 MM HG: CPT | Mod: CPTII,S$GLB,, | Performed by: PEDIATRICS

## 2024-05-16 PROCEDURE — 93005 ELECTROCARDIOGRAM TRACING: CPT | Mod: S$GLB,,, | Performed by: PEDIATRICS

## 2024-05-16 PROCEDURE — G2211 COMPLEX E/M VISIT ADD ON: HCPCS | Mod: S$GLB,,, | Performed by: PEDIATRICS

## 2024-05-16 PROCEDURE — 93325 DOPPLER ECHO COLOR FLOW MAPG: CPT | Mod: 26,,, | Performed by: PEDIATRICS

## 2024-05-16 PROCEDURE — 99214 OFFICE O/P EST MOD 30 MIN: CPT | Mod: S$GLB,,, | Performed by: PEDIATRICS

## 2024-05-16 PROCEDURE — 99999 PR PBB SHADOW E&M-EST. PATIENT-LVL III: CPT | Mod: PBBFAC,,, | Performed by: PEDIATRICS

## 2024-05-16 PROCEDURE — 93303 ECHO TRANSTHORACIC: CPT | Mod: 26,,, | Performed by: PEDIATRICS

## 2024-05-16 PROCEDURE — 1111F DSCHRG MED/CURRENT MED MERGE: CPT | Mod: CPTII,S$GLB,, | Performed by: PEDIATRICS

## 2024-05-16 PROCEDURE — 3008F BODY MASS INDEX DOCD: CPT | Mod: CPTII,S$GLB,, | Performed by: PEDIATRICS

## 2024-05-16 PROCEDURE — 93320 DOPPLER ECHO COMPLETE: CPT | Mod: 26,,, | Performed by: PEDIATRICS

## 2024-05-16 PROCEDURE — 1159F MED LIST DOCD IN RCRD: CPT | Mod: CPTII,S$GLB,, | Performed by: PEDIATRICS

## 2024-05-16 PROCEDURE — 93303 ECHO TRANSTHORACIC: CPT

## 2024-05-16 RX ORDER — FLUTICASONE PROPIONATE 50 MCG
1 SPRAY, SUSPENSION (ML) NASAL EVERY MORNING
COMMUNITY
Start: 2024-03-24

## 2024-05-16 RX ORDER — SEMAGLUTIDE 0.68 MG/ML
0.25 INJECTION, SOLUTION SUBCUTANEOUS WEEKLY
COMMUNITY
Start: 2024-01-15 | End: 2024-05-28

## 2024-05-16 NOTE — PROGRESS NOTES
2024    re:Mer Priest  :1975    Venecia Smith MD  [address] [place] [address]    Dr. Michael Bernard [facility]     Mer Priest is a 49 y.o. female seen in my [facility] clinic today for evaluation of congenital heart disease.  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  We will plan to continue Plavix for 6 months after the valve replacement, but she will let me know if the bleeding gets worse, and we would consider stopping that medication after discussing with the cath team.    She definitely requires SBE prophylaxis.  We discussed the importance of regular dental follow-up and good oral hygiene.  She is going to make a dentist appointment.  She will let me know when in his scheduled, and we can call her in 2 g of amoxicillin to be taken beforehand.  We discussed the signs and symptoms of endocarditis.  Follow-up with me in 6 months with repeat echocardiogram and EKG.  Can likely space clinic visits to yearly at that time.    Discussion:  She has severe pulmonary valve insufficiency as expected after primary repair of tetralogy.  This is associated with severe right ventricular enlargement.  I would expect improved exercise tolerance with pulmonary valve replacement.  Emerging data suggests at decreased risk of life-threatening arrhythmias and improved life expectancy with pulmonary valve replacement as well.  I am hopeful that she will be a candidate for a catheter based self expanding pulmonary valve, and we will get a CT scan to assess for this.  The self expanding valve can cover the right ventricular  outflow tract, a common area for ventricular arrhythmias in these patients.  We may want to consider EP testing and potential ablation prior to catheter placement.  I will discuss with the EP team.    History of present illness:  She really feel great since her new pulmonary valve was placed.  Her dyspnea on exertion has resolved.  No chest pain.  No palpitations, syncope, cyanosis, edema, fever, night sweats, or other new issues.  She has had some easy bruising and some mild bleeding from her gums attributed to the Plavix.  However, this is not a significant concern.    A paternal aunt  as a baby from congenital heart disease.  No other family history of congenital heart disease or sudden death.    Past Medical History:   Diagnosis Date    Benign paroxysmal positional vertigo 2023    GERD without esophagitis 2023    Heart disease     Hypertension     Syphilis 2020     Past Surgical History:   Procedure Laterality Date    ANGIOGRAM, PULMONARY ARTERIES  2024    Procedure: Angiogram, Pulmonary Arteries;  Surgeon: Lady Pfeiffer III., MD;  Location: [facility] CATH LAB;  Service: Cardiology;;    BILATERAL TUBAL LIGATION      COMBINED RIGHT AND RETROGRADE LEFT HEART CATHETERIZATION FOR CONGENITAL HEART DEFECT N/A 2024    Procedure: Catheterization, Heart, Combined Right and Retrograde Left, for Congenital Heart Defect;  Surgeon: Lady Pfeiffer III., MD;  Location: [facility] CATH LAB;  Service: Cardiology;  Laterality: N/A;    ICE, PERFORMED  2024    Procedure: ICE, Performed;  Surgeon: Lady Pfeiffer III., MD;  Location: [facility] CATH LAB;  Service: Cardiology;;    REPLACE, PULMONARY, VALVE, PEDIATRIC N/A 2024    Procedure: Replace, Pulmonary, Valve, Pediatric;  Surgeon: Lady Pfeiffer III., MD;  Location: [facility] CATH LAB;  Service: Cardiology;  Laterality: N/A;    TETRALOGY OF FALLOT REPAIR       Family History   Problem Relation Name Age of Onset    Prostate cancer Father       Ovarian cancer Cousin       Social History     Socioeconomic History    Marital status: Single   Tobacco Use    Smoking status: Former     Current packs/day: 0.00     Types: Cigarettes     Quit date: 2022     Years since quittin.0    Smokeless tobacco: Never   Substance and Sexual Activity    Alcohol use: Not Currently    Drug use: Never    Sexual activity: Not Currently     Social Determinants of Health     Financial Resource Strain: High Risk (2024)    Overall Financial Resource Strain (CARDIA)     Difficulty of Paying Living Expenses: Very hard   Food Insecurity: Food Insecurity Present (2024)    Hunger Vital Sign     Worried About Running Out of Food in the Last Year: Often true     Ran Out of Food in the Last Year: Often true   Transportation Needs: No Transportation Needs (2024)    PRAPARE - Transportation     Lack of Transportation (Medical): No     Lack of Transportation (Non-Medical): No   Physical Activity: Inactive (2024)    Exercise Vital Sign     Days of Exercise per Week: 0 days     Minutes of Exercise per Session: 0 min   Stress: Stress Concern Present (2024)    [employer] - Occupational Stress Questionnaire     Feeling of Stress : To some extent   Housing Stability: Unknown (2024)    Housing Stability Vital Sign     Unable to Pay for Housing in the Last Year: No     Unstable Housing in the Last Year: No     Current Outpatient Medications on File Prior to Visit   Medication Sig Dispense Refill    amLODIPine (NORVASC) 5 MG tablet Take 1 tablet by mouth every day 90 tablet 3    aspirin 81 MG Chew Chew and swallow 1 tablet (81 mg total) by mouth once daily. 30 tablet 6    clopidogreL (PLAVIX) 75 mg tablet Take 1 tablet (75 mg total) by mouth once daily. 30 tablet 11    fluticasone propionate (FLONASE) 50 mcg/actuation nasal spray 1 spray by Each Nostril route every morning.      gabapentin (NEURONTIN) 300 MG capsule Take 1 capsule (300 mg  total) by mouth 3 (three) times daily as needed. 90 capsule 1    hydroCHLOROthiazide (HYDRODIURIL) 25 MG tablet Take 1 tablet (25 mg total) by mouth once daily. 90 tablet 1    metoprolol succinate (TOPROL-XL) 50 MG 24 hr tablet Take 1 tablet (50 mg total) by mouth 2 (two) times daily. 180 tablet 3    ergocalciferol (ERGOCALCIFEROL) 50,000 unit Cap Take 1 capsule (50,000 Units total) by mouth every 7 days. (Patient not taking: Reported on 5/16/2024) 12 capsule 1    semaglutide (OZEMPIC) 0.25 mg or 0.5 mg (2 mg/3 mL) pen injector Inject 0.25 mg into the skin once a week. (Patient not taking: Reported on 5/16/2024)      [DISCONTINUED] benzonatate (TESSALON) 200 MG capsule TAKE 1 CAPSULE (ORAL) 3 TIMES PER DAY AS NEEDED FOR COUGH DO NOT CUT, BITE, CRUSH OR CHEW. (Patient not taking: Reported on 5/16/2024)      [DISCONTINUED] blood sugar diagnostic Strp To check BG 3 times daily, to use with insurance preferred meter (Patient not taking: Reported on 5/16/2024) 100 strip 11    [DISCONTINUED] blood-glucose meter kit To check BG 3 times daily, to use with insurance preferred meter (Patient not taking: Reported on 5/16/2024) 1 each 0    [DISCONTINUED] cetirizine (ZYRTEC) 10 MG tablet Take 1 tablet (10 mg total) by mouth daily as needed for Rhinitis. (Patient not taking: Reported on 5/16/2024) 30 tablet 0    [DISCONTINUED] lancets Misc To check BG 3 times daily, to use with insurance preferred meter (Patient not taking: Reported on 5/16/2024) 100 each 11    [DISCONTINUED] levocetirizine (XYZAL) 5 MG tablet Take 1 tablet (5 mg total) by mouth every evening. 30 tablet 11    [DISCONTINUED] meclizine (ANTIVERT) 25 mg tablet Take 1 tablet (25 mg total) by mouth 3 (three) times daily as needed for Dizziness. (Patient not taking: Reported on 5/16/2024) 30 tablet 0     No current facility-administered medications on file prior to visit.     Review of patient's allergies indicates:   Allergen Reactions    Ace inhibitors Swelling     "Escitalopram Other (See Comments)     Heart palpitations    Lisinopril Other (See Comments)        The review of systems is as noted above. It is otherwise negative for other symptoms related to the general, neurological, psychiatric, endocrine, gastrointestinal, genitourinary, respiratory, dermatologic, musculoskeletal, hematologic, and immunologic systems.    Vitals:    05/16/24 1326   BP: (!) 140/73   BP Location: Left arm   Patient Position: Sitting   Pulse: 70   SpO2: 95%   Weight: 95.2 kg (209 lb 14.1 oz)   Height: 5' 7.99" (1.727 m)     Wt Readings from Last 3 Encounters:   05/16/24 95.2 kg (209 lb 14.1 oz)   05/16/24 94.3 kg (207 lb 12.8 oz)   04/24/24 98 kg (216 lb 0.8 oz)     Ht Readings from Last 3 Encounters:   05/16/24 5' 7.99" (1.727 m)   05/16/24 5' 8" (1.727 m)   04/24/24 5' 8" (1.727 m)     Body mass index is 31.92 kg/m².  Facility age limit for growth %mely is 20 years.  Facility age limit for growth %mely is 20 years.  Facility age limit for growth %mely is 20 years.   In general, she is an obese, very healthy-appearing nondysmorphic female in no apparent distress.  The eyes, nares, and oropharynx are clear.  Eyelids and conjunctiva are normal without drainage or erythema.  Pupils equal and round bilaterally.  The head is normocephalic and atraumatic.  The neck is supple without jugular venous distention or thyroid enlargement.  The lungs are clear to auscultation bilaterally.  A well-healed sternotomy incision is noted.  First heart sound is normal, 2nd is fixed and split.  A grade 1/6 systolic ejection murmur is followed by blowing grade 2/6 diastolic murmur.  A prominent right ventricular impulse is present.  The abdominal exam is benign without hepatosplenomegaly, tenderness, or distention.  Pulses are normal in all 4 extremities with brisk capillary refill and no clubbing, cyanosis, or edema.  No rashes are noted.    I personally reviewed the following tests performed today and my " interpretation follows:  EKG reveals normal sinus rhythm with a right bundle branch block.  QRS duration 160 milliseconds.    The official echo report is pending.  I reviewed this study in detail.  My interpretation is as follows:   1. The new pulmonary valve is functioning very well with trivial stenosis, peak velocity 2.2 m/sec.  No insufficiency.    2. No effusion.    3. Excellent biventricular function, likely mild right ventricular enlargement.    Cath 4/23/24:  IMPRESSION:  1) Repaired tetralogy of Fallot via trans annular patch with free pulmonary insufficiency and severe RV enlargement  2) Mildly elevated PA pressure with normal resistance.  Normal cardiac output  3) Harmonary TPV 25 pulmonary valve implantation. No stenosis or insufficiency      Holter 7/25/23:    The predominant rhythm is sinus.    Heart rates varied between 54 and 88 BPM with an average of 67 BPM.    Patient Reported Event #1 There were no patient reported events    9 runs of VT, longest 3 beats    356 runs of SVT longest 7 beats    MRI 6/24/22 at [facility]:  Repaired tetralogy of Fallot with dilated right ventricle  mL (normal ), EDV/.15 mL/m2 (normal 57-95). Mildly decreased right ventricular function, RVEF =38%.   Mild left ventricular dilatation, mild to moderately reduced left ventricular function, LVEF =45%.   Small diverticulum of the anterior septum.   Late gadolinium enhancement at the right ventricular outflow tract likely related to patch repair.   Severely dilated right ventricular outflow tract with free pulmonic insufficiency.     Thank you for referring this patient to our clinic.  Please call with any questions.    Sincerely,        Amrik Roberts MD  Pediatric Cardiology  Adult Congenital Heart Disease  Pediatric Heart Failure and Transplantation  [facility] [address] [place]  [address]  [phone]

## 2024-05-27 RX ORDER — FLUOCINOLONE ACETONIDE 0.11 MG/ML
3 OIL AURICULAR (OTIC) 2 TIMES DAILY
Qty: 20 ML | Refills: 0 | Status: SHIPPED | OUTPATIENT
Start: 2024-05-27 | End: 2024-06-03

## 2024-06-27 ENCOUNTER — OFFICE VISIT (OUTPATIENT)
Dept: PULMONOLOGY | Facility: CLINIC | Age: 49
End: 2024-06-27
Payer: COMMERCIAL

## 2024-06-27 VITALS
DIASTOLIC BLOOD PRESSURE: 90 MMHG | WEIGHT: 210.56 LBS | OXYGEN SATURATION: 97 % | HEIGHT: 67 IN | SYSTOLIC BLOOD PRESSURE: 110 MMHG | RESPIRATION RATE: 14 BRPM | HEART RATE: 73 BPM | BODY MASS INDEX: 33.05 KG/M2

## 2024-06-27 DIAGNOSIS — Z95.4 S/P TRANSCATHETER REPLACEMENT OF PULMONARY VALVE: Primary | ICD-10-CM

## 2024-06-27 DIAGNOSIS — G47.33 OBSTRUCTIVE SLEEP APNEA: ICD-10-CM

## 2024-06-27 PROCEDURE — 99999 PR PBB SHADOW E&M-EST. PATIENT-LVL IV: CPT | Mod: PBBFAC,,, | Performed by: HOSPITALIST

## 2024-06-27 PROCEDURE — 3074F SYST BP LT 130 MM HG: CPT | Mod: CPTII,S$GLB,, | Performed by: HOSPITALIST

## 2024-06-27 PROCEDURE — 3080F DIAST BP >= 90 MM HG: CPT | Mod: CPTII,S$GLB,, | Performed by: HOSPITALIST

## 2024-06-27 PROCEDURE — 1160F RVW MEDS BY RX/DR IN RCRD: CPT | Mod: CPTII,S$GLB,, | Performed by: HOSPITALIST

## 2024-06-27 PROCEDURE — 1159F MED LIST DOCD IN RCRD: CPT | Mod: CPTII,S$GLB,, | Performed by: HOSPITALIST

## 2024-06-27 PROCEDURE — 99213 OFFICE O/P EST LOW 20 MIN: CPT | Mod: S$GLB,,, | Performed by: HOSPITALIST

## 2024-06-27 PROCEDURE — 3008F BODY MASS INDEX DOCD: CPT | Mod: CPTII,S$GLB,, | Performed by: HOSPITALIST

## 2024-06-27 NOTE — ASSESSMENT & PLAN NOTE
- pt not within compliance- 53% past 30 days, 27% >8 hours  - decreased use in the beginning as she was staying with her father who is in the hospital, then had valve replacement in the beginning of April and was in hospital, multiple life stressors  - No definite issues with tolerance, does have significant leak on some days, having to remove and replace mask through the night, mouth dryness  - discussed likelihood of having to return machine and then requalify, pt understands  - to message me when ready to proceed with repeat sleep study, motivated to be compliant  - to try and go to sleep an hour earlier

## 2024-06-27 NOTE — PROGRESS NOTES
"Subjective:      Patient ID: Mer Priest is a 49 y.o. female.    Chief Complaint: Sleep Apnea    Interval Hx 6/27/24:    Mrs. Priest presents to Pulmonary clinic for CPAP follow up. Chart reviewed- she had a pulmonary valve replacement in April.    Feels like mask might be small, feels like building up moisture around mouth. Straps having to be tight to prevent seal. Feels like not enough pressure.        HPI 1/2/2024:     48 year old female with history of repaired tetrology of allot, severe pulmonary valve insufficiency, severe RV enlargement who presents to Pulmonary clinic for follow up HST. She was previously seen 8/2023 by Dr. Garner- at that visit she reported snoring, difficulty falling asleep. [place] today is 7.      Pertinent Work Up:  HST 12/27/23- AHI 46, min SpO2 72.6%, with valid recording time <2 hours  ECHO 12/2023- EF 55-60%, Severe dilation RV, ePASP 44mmHg    Review of Systems   Respiratory:  Positive for somnolence.      Objective:     Physical Exam   Constitutional: She is oriented to person, place, and time. She appears well-developed and well-nourished. She is not obese.   Cardiovascular: Normal rate and regular rhythm.   Pulmonary/Chest: Normal expansion, effort normal and breath sounds normal.   Neurological: She is alert and oriented to person, place, and time.     Personal Diagnostic Review  As Above      5/16/2024     1:26 PM 5/16/2024    12:00 PM 4/24/2024     3:50 PM 4/24/2024    11:05 AM 4/24/2024    10:11 AM 4/24/2024     7:48 AM 4/24/2024     5:09 AM   Pulmonary Function Tests   SpO2 95 %  92 % 93 %  93 % 91 %   Height 5' 7.99" (1.727 m) 5' 8" (1.727 m)   5' 8" (1.727 m)     Weight 95.2 kg (209 lb 14.1 oz) 94.3 kg (207 lb 12.8 oz)   98 kg (216 lb 0.8 oz)     BMI (Calculated) 31.9 31.6   32.9          Assessment:     No diagnosis found.     Outpatient Encounter Medications as of 6/27/2024   Medication Sig Dispense Refill    amLODIPine (NORVASC) 5 MG tablet Take 1 tablet by mouth " every day 90 tablet 3    aspirin 81 MG Chew Chew and swallow 1 tablet (81 mg total) by mouth once daily. 30 tablet 6    clopidogreL (PLAVIX) 75 mg tablet Take 1 tablet (75 mg total) by mouth once daily. 30 tablet 11    ergocalciferol (ERGOCALCIFEROL) 50,000 unit Cap TAKE 1 CAPSULE BY MOUTH ONE TIME PER WEEK 12 capsule 1    fluticasone propionate (FLONASE) 50 mcg/actuation nasal spray 1 spray by Each Nostril route every morning.      gabapentin (NEURONTIN) 300 MG capsule Take 1 capsule (300 mg total) by mouth 3 (three) times daily as needed. 90 capsule 1    hydroCHLOROthiazide (HYDRODIURIL) 25 MG tablet Take 1 tablet (25 mg total) by mouth once daily. 90 tablet 1    metoprolol succinate (TOPROL-XL) 50 MG 24 hr tablet Take 1 tablet (50 mg total) by mouth 2 (two) times daily. 180 tablet 3    tirzepatide (MOUNJARO) 5 mg/0.5 mL PnIj Inject 5 mg into the skin every 7 days. 12 Pen 1     No facility-administered encounter medications on file as of 6/27/2024.     No orders of the defined types were placed in this encounter.        Plan:     Problem List Items Addressed This Visit          Cardiac/Vascular    S/P transcatheter replacement of pulmonary valve - Primary     - feeling much better post-op  - more energy, no further dyspnea            Other    Obstructive sleep apnea     - pt not within compliance- 53% past 30 days, 27% >8 hours  - decreased use in the beginning as she was staying with her father who is in the hospital, then had valve replacement in the beginning of April and was in hospital, multiple life stressors  - No definite issues with tolerance, does have significant leak on some days, having to remove and replace mask through the night, mouth dryness  - discussed likelihood of having to return machine and then requalify, pt understands  - to message me when ready to proceed with repeat sleep study, motivated to be compliant  - to try and go to sleep an hour earlier          Plan discussed with pt and she  expressed understanding, all questions answered. Pt to message when ready to proceed with study. Reinforced importance of treating severe sleep apnea.

## 2024-06-28 ENCOUNTER — LAB VISIT (OUTPATIENT)
Dept: LAB | Facility: HOSPITAL | Age: 49
End: 2024-06-28
Attending: NURSE PRACTITIONER
Payer: COMMERCIAL

## 2024-06-28 DIAGNOSIS — D50.0 IRON DEFICIENCY ANEMIA DUE TO CHRONIC BLOOD LOSS: ICD-10-CM

## 2024-06-28 DIAGNOSIS — N92.1 MENORRHAGIA WITH IRREGULAR CYCLE: ICD-10-CM

## 2024-06-28 LAB
BASOPHILS # BLD AUTO: 0.05 K/UL (ref 0–0.2)
BASOPHILS NFR BLD: 0.6 % (ref 0–1.9)
DIFFERENTIAL METHOD BLD: ABNORMAL
EOSINOPHIL # BLD AUTO: 0.1 K/UL (ref 0–0.5)
EOSINOPHIL NFR BLD: 1.8 % (ref 0–8)
ERYTHROCYTE [DISTWIDTH] IN BLOOD BY AUTOMATED COUNT: 13.7 % (ref 11.5–14.5)
FERRITIN SERPL-MCNC: 163 NG/ML (ref 20–300)
HCT VFR BLD AUTO: 36.1 % (ref 37–48.5)
HGB BLD-MCNC: 11.3 G/DL (ref 12–16)
IMM GRANULOCYTES # BLD AUTO: 0.01 K/UL (ref 0–0.04)
IMM GRANULOCYTES NFR BLD AUTO: 0.1 % (ref 0–0.5)
IRON SERPL-MCNC: 61 UG/DL (ref 30–160)
LYMPHOCYTES # BLD AUTO: 2 K/UL (ref 1–4.8)
LYMPHOCYTES NFR BLD: 25.7 % (ref 18–48)
MCH RBC QN AUTO: 27.2 PG (ref 27–31)
MCHC RBC AUTO-ENTMCNC: 31.3 G/DL (ref 32–36)
MCV RBC AUTO: 87 FL (ref 82–98)
MONOCYTES # BLD AUTO: 0.5 K/UL (ref 0.3–1)
MONOCYTES NFR BLD: 6.6 % (ref 4–15)
NEUTROPHILS # BLD AUTO: 5 K/UL (ref 1.8–7.7)
NEUTROPHILS NFR BLD: 65.2 % (ref 38–73)
NRBC BLD-RTO: 0 /100 WBC
PLATELET # BLD AUTO: 218 K/UL (ref 150–450)
PMV BLD AUTO: 11.5 FL (ref 9.2–12.9)
RBC # BLD AUTO: 4.15 M/UL (ref 4–5.4)
SATURATED IRON: 18 % (ref 20–50)
TOTAL IRON BINDING CAPACITY: 336 UG/DL (ref 250–450)
TRANSFERRIN SERPL-MCNC: 227 MG/DL (ref 200–375)
WBC # BLD AUTO: 7.73 K/UL (ref 3.9–12.7)

## 2024-06-28 PROCEDURE — 85025 COMPLETE CBC W/AUTO DIFF WBC: CPT | Performed by: NURSE PRACTITIONER

## 2024-06-28 PROCEDURE — 82728 ASSAY OF FERRITIN: CPT | Performed by: NURSE PRACTITIONER

## 2024-06-28 PROCEDURE — 83540 ASSAY OF IRON: CPT | Performed by: NURSE PRACTITIONER

## 2024-06-28 PROCEDURE — 36415 COLL VENOUS BLD VENIPUNCTURE: CPT | Mod: PO | Performed by: NURSE PRACTITIONER

## 2024-07-01 ENCOUNTER — TELEPHONE (OUTPATIENT)
Dept: HEMATOLOGY/ONCOLOGY | Facility: CLINIC | Age: 49
End: 2024-07-01
Payer: COMMERCIAL

## 2024-07-01 NOTE — TELEPHONE ENCOUNTER
N/a nurse attempted to contact pt in regards to rescheduling her missed appt, n/a lvm for pt to rtc

## 2024-08-23 ENCOUNTER — PATIENT MESSAGE (OUTPATIENT)
Dept: CARDIOLOGY | Facility: CLINIC | Age: 49
End: 2024-08-23
Payer: COMMERCIAL

## 2024-09-04 ENCOUNTER — PATIENT MESSAGE (OUTPATIENT)
Dept: CARDIOLOGY | Facility: CLINIC | Age: 49
End: 2024-09-04
Payer: COMMERCIAL

## 2024-09-04 ENCOUNTER — HOSPITAL ENCOUNTER (OUTPATIENT)
Dept: RADIOLOGY | Facility: HOSPITAL | Age: 49
Discharge: HOME | End: 2024-09-04
Attending: NURSE PRACTITIONER
Payer: COMMERCIAL

## 2024-09-04 DIAGNOSIS — Z12.31 BREAST CANCER SCREENING BY MAMMOGRAM: ICD-10-CM

## 2024-09-04 PROCEDURE — 77067 SCR MAMMO BI INCL CAD: CPT | Mod: 26,,, | Performed by: RADIOLOGY

## 2024-09-04 PROCEDURE — 77063 BREAST TOMOSYNTHESIS BI: CPT | Mod: 26,,, | Performed by: RADIOLOGY

## 2024-09-04 PROCEDURE — 77067 SCR MAMMO BI INCL CAD: CPT | Mod: TC,PN

## 2024-09-25 PROBLEM — E11.9 TYPE 2 DIABETES MELLITUS WITHOUT COMPLICATION, WITHOUT LONG-TERM CURRENT USE OF INSULIN: Status: ACTIVE | Noted: 2024-09-25

## 2024-09-30 ENCOUNTER — OFFICE VISIT (OUTPATIENT)
Dept: URGENT CARE | Facility: CLINIC | Age: 49
End: 2024-09-30
Payer: COMMERCIAL

## 2024-09-30 VITALS
HEART RATE: 73 BPM | HEIGHT: 68 IN | OXYGEN SATURATION: 97 % | RESPIRATION RATE: 18 BRPM | DIASTOLIC BLOOD PRESSURE: 22 MMHG | SYSTOLIC BLOOD PRESSURE: 172 MMHG | WEIGHT: 211 LBS | BODY MASS INDEX: 31.98 KG/M2 | TEMPERATURE: 98 F

## 2024-09-30 DIAGNOSIS — R42 DIZZINESS: Primary | ICD-10-CM

## 2024-09-30 LAB — GLUCOSE SERPL-MCNC: 99 MG/DL (ref 70–110)

## 2024-09-30 PROCEDURE — 82962 GLUCOSE BLOOD TEST: CPT | Mod: S$GLB,,, | Performed by: PHYSICIAN ASSISTANT

## 2024-09-30 PROCEDURE — 99213 OFFICE O/P EST LOW 20 MIN: CPT | Mod: S$GLB,,, | Performed by: PHYSICIAN ASSISTANT

## 2024-09-30 NOTE — PROGRESS NOTES
"Subjective:      Patient ID: Mer Priest is a 49 y.o. female.    Vitals:  height is 5' 8" (1.727 m) and weight is 95.7 kg (211 lb). Her oral temperature is 97.9 °F (36.6 °C). Her blood pressure is 172/22 (abnormal) and her pulse is 73. Her respiration is 18 and oxygen saturation is 97%.     Chief Complaint: Dizziness (Onset today )    49 year old female patient presents here today with dizziness & giddiness onset today. Pt noted her stomach feels qeezy. Pt noted some hot flashes. Pt has concerns of her bp & glucose levels today. Pt states she ate [employer] on last night, no breakfast this Am. Pt states she is going through a lot at home(death issue, family health, etc). Pt denies sob, cp, fever.         Dizziness:   Chronicity:  New  Onset:  Today  Progression since onset:  Unchanged  Frequency:  Constantly  Pain Scale:  0/10  Duration:  Very brief  Dizziness characteristics:  Lightheaded/impending faint and giddiness  Frequency of Spells:  Hourly   Associated symptoms: light-headedness and panic.no hearing loss, no ear congestion, no ear pain, no fever, no headaches, no tinnitus, no nausea, no vomiting, no diaphoresis, no aural fullness, no weakness, no visual disturbances, no syncope, no palpitations, no facial weakness, no slurred speech, no numbness in extremities and no chest pain.  Treatments tried:  Body position changes  Improvements on treatment:  No reliefno ear trauma, no ear surgery, no ear infections and no ear tubes.      Constitution: Negative for sweating and fever.   HENT:  Negative for ear pain, tinnitus and hearing loss.    Cardiovascular:  Negative for chest pain, palpitations and passing out.   Gastrointestinal:  Negative for nausea and vomiting.   Neurological:  Positive for dizziness and light-headedness. Negative for headaches.      Objective:     Physical Exam   Constitutional: She is oriented to person, place, and time. She appears well-developed.   HENT:   Head: Normocephalic and " atraumatic.   Ears:   Right Ear: External ear normal.   Left Ear: External ear normal.   Nose: Nose normal.   Mouth/Throat: Oropharynx is clear and moist.   Eyes: Conjunctivae, EOM and lids are normal.   Neck: Trachea normal and phonation normal. Neck supple.   Cardiovascular: Normal rate.   Pulmonary/Chest: Effort normal.   Musculoskeletal: Normal range of motion.         General: Normal range of motion.   Neurological: She is alert and oriented to person, place, and time.   Skin: Skin is warm, dry and intact.   Psychiatric: Her speech is normal and behavior is normal. Judgment and thought content normal.   Nursing note and vitals reviewed.      Assessment:     1. Dizziness        Plan:   VSS. Patient non-toxic appearing.  Advised patient to follow up with PCP as needed.  Patient verbalized understanding, agrees with the plan, and is comfortable with discharge.      Dizziness  -     POCT Glucose, Hand-Held Device      Medical Decision Making:   Clinical Tests:   Lab Tests: Ordered and Reviewed       <> Summary of Lab: Blood glucose: 99 mg/dL

## 2024-09-30 NOTE — LETTER
September 30, 2024      [facility]  [address], [address]  [place] [address]  Phone: [phone]       Patient: Mer Priest   YOB: 1975  Date of Visit: 09/30/2024    To Whom It May Concern:    Shana Priest  was at [facility] on 09/30/2024. The patient may return to work/school on 10/1/24 with no restrictions. If you have any questions or concerns, or if I can be of further assistance, please do not hesitate to contact me.    Sincerely,      Annemarie Olvera PA-C

## 2024-12-06 ENCOUNTER — PATIENT MESSAGE (OUTPATIENT)
Dept: CARDIOLOGY | Facility: CLINIC | Age: 49
End: 2024-12-06
Payer: COMMERCIAL

## 2024-12-06 DIAGNOSIS — I51.7 RIGHT VENTRICULAR ENLARGEMENT: ICD-10-CM

## 2024-12-06 DIAGNOSIS — Z87.74 S/P REPAIR OF TETRALOGY OF FALLOT: ICD-10-CM

## 2024-12-06 DIAGNOSIS — Z95.4 S/P TRANSCATHETER REPLACEMENT OF PULMONARY VALVE: ICD-10-CM

## 2024-12-06 DIAGNOSIS — Q24.9 ADULT CONGENITAL HEART DISEASE: Primary | ICD-10-CM

## 2024-12-12 ENCOUNTER — PATIENT MESSAGE (OUTPATIENT)
Dept: CARDIOLOGY | Facility: CLINIC | Age: 49
End: 2024-12-12
Payer: COMMERCIAL

## 2024-12-12 RX ORDER — AMOXICILLIN 500 MG/1
2000 CAPSULE ORAL ONCE AS NEEDED
Qty: 4 CAPSULE | Refills: 2 | Status: SHIPPED | OUTPATIENT
Start: 2024-12-12 | End: 2024-12-14

## 2024-12-17 ENCOUNTER — PATIENT MESSAGE (OUTPATIENT)
Dept: OTHER | Facility: OTHER | Age: 49
End: 2024-12-17
Payer: COMMERCIAL

## 2025-01-15 ENCOUNTER — HOSPITAL ENCOUNTER (OUTPATIENT)
Dept: RADIOLOGY | Facility: HOSPITAL | Age: 50
Discharge: HOME | End: 2025-01-15
Attending: PODIATRIST
Payer: COMMERCIAL

## 2025-01-15 ENCOUNTER — OFFICE VISIT (OUTPATIENT)
Dept: PODIATRY | Facility: CLINIC | Age: 50
End: 2025-01-15
Payer: COMMERCIAL

## 2025-01-15 VITALS — WEIGHT: 212.31 LBS | BODY MASS INDEX: 32.18 KG/M2 | HEIGHT: 68 IN

## 2025-01-15 DIAGNOSIS — M76.72 PERONEAL TENDINITIS OF LEFT LOWER EXTREMITY: ICD-10-CM

## 2025-01-15 DIAGNOSIS — M76.72 PERONEAL TENDINITIS OF LEFT LOWER EXTREMITY: Primary | ICD-10-CM

## 2025-01-15 PROCEDURE — 99204 OFFICE O/P NEW MOD 45 MIN: CPT | Mod: S$GLB,,, | Performed by: PODIATRIST

## 2025-01-15 PROCEDURE — 99999 PR PBB SHADOW E&M-EST. PATIENT-LVL III: CPT | Mod: PBBFAC,,, | Performed by: PODIATRIST

## 2025-01-15 PROCEDURE — 3008F BODY MASS INDEX DOCD: CPT | Mod: CPTII,S$GLB,, | Performed by: PODIATRIST

## 2025-01-15 PROCEDURE — 1159F MED LIST DOCD IN RCRD: CPT | Mod: CPTII,S$GLB,, | Performed by: PODIATRIST

## 2025-01-15 PROCEDURE — 73630 X-RAY EXAM OF FOOT: CPT | Mod: TC,LT

## 2025-01-15 PROCEDURE — 73610 X-RAY EXAM OF ANKLE: CPT | Mod: 26,LT,, | Performed by: RADIOLOGY

## 2025-01-15 PROCEDURE — 73610 X-RAY EXAM OF ANKLE: CPT | Mod: TC,LT

## 2025-01-15 PROCEDURE — 73630 X-RAY EXAM OF FOOT: CPT | Mod: 26,LT,, | Performed by: RADIOLOGY

## 2025-01-15 PROCEDURE — 1160F RVW MEDS BY RX/DR IN RCRD: CPT | Mod: CPTII,S$GLB,, | Performed by: PODIATRIST

## 2025-01-15 NOTE — PROGRESS NOTES
Subjective:     Patient ID: Mer Priest is a 49 y.o. female.    Chief Complaint: Foot Pain (New patient.Pt is having ankle pain x3 weeks.Pt reports pain runs from lateral aspect of left foot to ankle. Sometimes radiating to the dorsal aspect of foot.Diabetic.Pt rates pain 0/10@ present. Pain can get to 7/10 upon exertion. Pt wears tennis shoes and socks.Pt last PCP appt was with Dr Venecia Smith 10/18/2024.)    Mer is a 49 y.o. female who presents to the podiatry clinic  with complaint of  left foot pain. Onset of the symptoms was several weeks ago. Precipitating event: none known. Current symptoms include: ability to bear weight, but with some pain. Aggravating factors: standing and walking. Symptoms have been intermittent. Patients rates pain 7/10 on pain scale.    Patient Active Problem List   Diagnosis    Iron deficiency anemia due to chronic blood loss    Class 1 obesity due to excess calories without serious comorbidity with body mass index (BMI) of 31.0 to 31.9 in adult    Hypertension    RBBB    S/P repair of tetralogy of Fallot    Benign paroxysmal positional vertigo    GERD without esophagitis    Obstructive sleep apnea    Bilateral carpal tunnel syndrome    Current moderate episode of major depressive disorder without prior episode    Obesity with body mass index 30 or greater    Right ventricular enlargement    Adult congenital heart disease    Menorrhagia with irregular cycle    S/P transcatheter replacement of pulmonary valve    Type 2 diabetes mellitus without complication, without long-term current use of insulin       Medication List with Changes/Refills   Current Medications    AMLODIPINE (NORVASC) 5 MG TABLET    Take 1 tablet by mouth every day    ASPIRIN 81 MG CHEW    Chew and swallow 1 tablet (81 mg total) by mouth once daily.    BUSPIRONE (BUSPAR) 7.5 MG TABLET    Take 1 tablet (7.5 mg total) by mouth 2 (two) times a day.    CHOLECALCIFEROL, VITAMIN D3, 125 MCG (5,000 UNIT) CAPSULE     Take 1 capsule (5,000 Units total) by mouth once daily.    CLOPIDOGREL (PLAVIX) 75 MG TABLET    Take 1 tablet (75 mg total) by mouth once daily.    GABAPENTIN (NEURONTIN) 300 MG CAPSULE    Take 1 capsule (300 mg total) by mouth 3 (three) times daily as needed.    HYDROCHLOROTHIAZIDE (HYDRODIURIL) 25 MG TABLET    Take 1 tablet (25 mg total) by mouth once daily.    METOPROLOL SUCCINATE (TOPROL-XL) 50 MG 24 HR TABLET    Take 1 tablet (50 mg total) by mouth 2 (two) times daily.    ROSUVASTATIN (CRESTOR) 5 MG TABLET    Take 1 tablet (5 mg total) by mouth once daily.    TIRZEPATIDE (MOUNJARO) 5 MG/0.5 ML PNIJ    INJECT 5 MG SUBCUTANEOUSLY EVERY 7 DAYS       Review of patient's allergies indicates:   Allergen Reactions    Ace inhibitors Swelling    Escitalopram Other (See Comments)     Heart palpitations    Lisinopril Other (See Comments)       Past Surgical History:   Procedure Laterality Date    ANGIOGRAM, PULMONARY ARTERIES  04/23/2024    Procedure: Angiogram, Pulmonary Arteries;  Surgeon: Lady Pfeiffer III., MD;  Location: [facility] CATH LAB;  Service: Cardiology;;    BILATERAL TUBAL LIGATION      COMBINED RIGHT AND RETROGRADE LEFT HEART CATHETERIZATION FOR CONGENITAL HEART DEFECT N/A 04/23/2024    Procedure: Catheterization, Heart, Combined Right and Retrograde Left, for Congenital Heart Defect;  Surgeon: Lady Pfeiffer III., MD;  Location: [facility] CATH LAB;  Service: Cardiology;  Laterality: N/A;    ICE, PERFORMED  04/23/2024    Procedure: ICE, Performed;  Surgeon: Lady Pfeiffer III., MD;  Location: [facility] CATH LAB;  Service: Cardiology;;    REPLACE, PULMONARY, VALVE, PEDIATRIC N/A 04/23/2024    Procedure: Replace, Pulmonary, Valve, Pediatric;  Surgeon: Lady Pfeiffer III., MD;  Location: [facility] CATH LAB;  Service: Cardiology;  Laterality: N/A;    TETRALOGY OF FALLOT REPAIR  1977       Family History   Problem Relation Name Age of Onset    Prostate cancer Father      Breast cancer Paternal Aunt      Ovarian cancer  "Maternal Cousin 2nd cousin        Social History     Socioeconomic History    Marital status: Single   Tobacco Use    Smoking status: Former     Current packs/day: 0.00     Types: Cigarettes     Quit date: 2022     Years since quittin.7     Passive exposure: Current    Smokeless tobacco: Never   Substance and Sexual Activity    Alcohol use: Not Currently    Drug use: Never    Sexual activity: Not Currently     Social Drivers of Health     Financial Resource Strain: High Risk (2024)    Overall Financial Resource Strain (CARDIA)     Difficulty of Paying Living Expenses: Very hard   Food Insecurity: Food Insecurity Present (2024)    Hunger Vital Sign     Worried About Running Out of Food in the Last Year: Often true     Ran Out of Food in the Last Year: Often true   Transportation Needs: No Transportation Needs (2024)    PRAPARE - Transportation     Lack of Transportation (Medical): No     Lack of Transportation (Non-Medical): No   Physical Activity: Inactive (2024)    Exercise Vital Sign     Days of Exercise per Week: 0 days     Minutes of Exercise per Session: 0 min   Stress: Stress Concern Present (2024)    [employer] [place] [employer] - Occupational Stress Questionnaire     Feeling of Stress : To some extent   Housing Stability: Unknown (2024)    Housing Stability Vital Sign     Unable to Pay for Housing in the Last Year: No     Unstable Housing in the Last Year: No       Vitals:    01/15/25 1550   Weight: 96.3 kg (212 lb 4.9 oz)   Height: 5' 8" (1.727 m)       Hemoglobin A1C   Date Value Ref Range Status   2021 5.7 (H) 4.8 - 5.6 % Final     Comment:              Prediabetes: 5.7 - 6.4           Diabetes: >6.4           Glycemic control for adults with diabetes: <7.0   2020 5.6 4.8 - 5.6 % Final     Comment:              Prediabetes: 5.7 - 6.4           Diabetes: >6.4           Glycemic control for adults with diabetes: <7.0     Hemoglobin A1c   Date " Value Ref Range Status   10/01/2024 5.6 4.8 - 5.6 % Final     Comment:              Prediabetes: 5.7 - 6.4           Diabetes: >6.4           Glycemic control for adults with diabetes: <7.0     11/01/2023 6.6 (H) 4.8 - 5.6 % Final     Comment:              Prediabetes: 5.7 - 6.4           Diabetes: >6.4           Glycemic control for adults with diabetes: <7.0         Review of Systems   Constitutional:  Negative for chills and fever.   Respiratory:  Negative for shortness of breath.    Cardiovascular:  Negative for chest pain, palpitations, orthopnea, claudication and leg swelling.   Gastrointestinal:  Negative for diarrhea, nausea and vomiting.   Musculoskeletal:  Positive for joint pain (left lateral foot).   Skin:  Negative for rash.   Neurological:  Negative for dizziness, tingling, sensory change, focal weakness and weakness.   Psychiatric/Behavioral: Negative.               Objective:       PHYSICAL EXAM: Apperance: Alert and orient in no distress,well developed, and with good attention to grooming and body habits  Patient presents ambulating in tennis shoes.   Lower Extremity Physical Exam:  VASCULAR: Dorsalis pedis pulses 2/4 left and Posterior Tibial pulses 2/4 left.   MUSCULOSKELETAL: Muscle strength is 5/5 for foot inverters, everters, plantarflexors, and dorsiflexors. Muscle tone is normal. (+) pain on palpation of left 5th metatarsal base and along the peroneal tendons. Tenderness on palpation of left lateral midfoot/ankle. Tenderness on left ankle ROM.         Assessment:       ICD-10-CM ICD-9-CM   1. Peroneal tendinitis of left lower extremity - Left Foot  M76.72 726.79       Plan:   Peroneal tendinitis of left lower extremity - Left Foot  -     X-Ray Foot Complete Left; Future; Expected date: 01/15/2025  -     X-Ray Ankle Complete Left; Future; Expected date: 01/15/2025    I counseled the patient on her conditions, regarding findings of my examination, my impressions, and usual treatment plan.    Ordered left foot and ankle x-rays to be completed today.  Patient was fitted with lace up ankle brace and instructed on proper usage. This should be worn daily for a minimum of 2 weeks at all times when ambulating.  The patient and I reviewed the types of shoes she should be wearing, my recommendation includes generally the best time of the day for a shoe fitting is the afternoon, shoes with a wide toe box, very good cushion, and tennis shoes with removable inner soles.The patient and I reviewed my recommendations for over-the-counter orthotic inserts.   Patient to return in 4-6 weeks or sooner if needed.          Sondra Vargas DPM  [facility]

## 2025-01-30 ENCOUNTER — OFFICE VISIT (OUTPATIENT)
Dept: CARDIOLOGY | Facility: CLINIC | Age: 50
End: 2025-01-30
Payer: COMMERCIAL

## 2025-01-30 ENCOUNTER — HOSPITAL ENCOUNTER (OUTPATIENT)
Dept: CARDIOLOGY | Facility: HOSPITAL | Age: 50
Discharge: HOME | End: 2025-01-30
Attending: PEDIATRICS
Payer: COMMERCIAL

## 2025-01-30 ENCOUNTER — HOSPITAL ENCOUNTER (OUTPATIENT)
Dept: CARDIOLOGY | Facility: CLINIC | Age: 50
Discharge: HOME | End: 2025-01-30
Payer: COMMERCIAL

## 2025-01-30 VITALS
HEIGHT: 68 IN | HEART RATE: 78 BPM | OXYGEN SATURATION: 97 % | SYSTOLIC BLOOD PRESSURE: 122 MMHG | BODY MASS INDEX: 32.55 KG/M2 | WEIGHT: 214.75 LBS | DIASTOLIC BLOOD PRESSURE: 76 MMHG

## 2025-01-30 VITALS — BODY MASS INDEX: 32.13 KG/M2 | HEIGHT: 68 IN | WEIGHT: 212 LBS

## 2025-01-30 DIAGNOSIS — Z95.4 S/P TRANSCATHETER REPLACEMENT OF PULMONARY VALVE: ICD-10-CM

## 2025-01-30 DIAGNOSIS — Q24.9 ADULT CONGENITAL HEART DISEASE: ICD-10-CM

## 2025-01-30 DIAGNOSIS — Z87.74 S/P REPAIR OF TETRALOGY OF FALLOT: Primary | ICD-10-CM

## 2025-01-30 DIAGNOSIS — I51.7 RIGHT VENTRICULAR ENLARGEMENT: ICD-10-CM

## 2025-01-30 DIAGNOSIS — Z87.74 S/P REPAIR OF TETRALOGY OF FALLOT: ICD-10-CM

## 2025-01-30 DIAGNOSIS — I10 PRIMARY HYPERTENSION: ICD-10-CM

## 2025-01-30 LAB
ASCENDING AORTA: 3.11 CM
AV AREA BY CONTINUOUS VTI: 4.3 CM2
AV INDEX (PROSTH): 0.87
AV LVOT MEAN GRADIENT: 2 MMHG
AV LVOT PEAK GRADIENT: 5 MMHG
AV MEAN GRADIENT: 4 MMHG
AV PEAK GRADIENT: 7 MMHG
AV VALVE AREA BY VELOCITY RATIO: 4.2 CM²
AV VALVE AREA: 4.3 CM2
AV VELOCITY RATIO: 0.85
BSA FOR ECHO PROCEDURE: 2.15 M2
CV ECHO LV RWT: 0.54 CM
DOP CALC AO PEAK VEL: 1.3 M/S
DOP CALC AO VTI: 23.6 CM
DOP CALC LVOT AREA: 4.9 CM2
DOP CALC LVOT DIAMETER: 2.5 CM
DOP CALC LVOT PEAK VEL: 1.1 M/S
DOP CALC LVOT STROKE VOLUME: 101.1 CM3
DOP CALC RVOT VTI: 24.52 CM
DOP CALCLVOT PEAK VEL VTI: 20.6 CM
E WAVE DECELERATION TIME: 242 MS
E/A RATIO: 1.14
E/E' RATIO: 15 M/S
ECHO EF ESTIMATED: 51 %
ECHO LV POSTERIOR WALL: 1.1 CM (ref 0.6–1.1)
FRACTIONAL SHORTENING: 26.8 % (ref 28–44)
INTERVENTRICULAR SEPTUM: 1 CM (ref 0.6–1.1)
LA MAJOR: 5.9 CM
LA MINOR: 5.6 CM
LA WIDTH: 3.8 CM
LEFT ATRIUM SIZE: 3.7 CM
LEFT ATRIUM VOLUME INDEX MOD: 24 ML/M2
LEFT ATRIUM VOLUME INDEX: 33 ML/M2
LEFT ATRIUM VOLUME MOD: 51 ML
LEFT ATRIUM VOLUME: 69 CM3
LEFT INTERNAL DIMENSION IN SYSTOLE: 3 CM (ref 2.1–4)
LEFT VENTRICLE DIASTOLIC VOLUME INDEX: 35.06 ML/M2
LEFT VENTRICLE DIASTOLIC VOLUME: 73.62 ML
LEFT VENTRICLE MASS INDEX: 67.4 G/M2
LEFT VENTRICLE SYSTOLIC VOLUME INDEX: 17.3 ML/M2
LEFT VENTRICLE SYSTOLIC VOLUME: 36.29 ML
LEFT VENTRICULAR INTERNAL DIMENSION IN DIASTOLE: 4.1 CM (ref 3.5–6)
LEFT VENTRICULAR MASS: 141.5 G
LV LATERAL E/E' RATIO: 10.8
LV SEPTAL E/E' RATIO: 27
MV PEAK A VEL: 0.95 M/S
MV PEAK E VEL: 1.08 M/S
OHS CV RV/LV RATIO: 1.34 CM
OHS QRS DURATION: 172 MS
OHS QTC CALCULATION: 491 MS
PISA TR MAX VEL: 2.8 M/S
PV MEAN GRADIENT: 10 MMHG
PV MV: 1.51 M/S
PV PEAK GRADIENT: 17 MMHG
PV PEAK VELOCITY: 2.09 M/S
RA MAJOR: 4.08 CM
RA WIDTH: 4.18 CM
RIGHT ATRIAL AREA: 18.5 CM2
RIGHT VENTRICLE DIASTOLIC BASEL DIMENSION: 5.5 CM
RV TISSUE DOPPLER FREE WALL SYSTOLIC VELOCITY 1 (APICAL 4 CHAMBER VIEW): 7.99 CM/S
SINUS: 3.03 CM
TDI LATERAL: 0.1 M/S
TDI SEPTAL: 0.04 M/S
TDI: 0.07 M/S
TR MAX PG: 31 MMHG
TRICUSPID ANNULAR PLANE SYSTOLIC EXCURSION: 1.92 CM
TV PEAK GRADIENT: 32 MMHG
Z-SCORE OF LEFT VENTRICULAR DIMENSION IN END DIASTOLE: -4.65
Z-SCORE OF LEFT VENTRICULAR DIMENSION IN END SYSTOLE: -2.25

## 2025-01-30 PROCEDURE — 93010 ELECTROCARDIOGRAM REPORT: CPT | Mod: S$GLB,,, | Performed by: INTERNAL MEDICINE

## 2025-01-30 PROCEDURE — 93303 ECHO TRANSTHORACIC: CPT

## 2025-01-30 PROCEDURE — 93320 DOPPLER ECHO COMPLETE: CPT | Mod: 26,,, | Performed by: PEDIATRICS

## 2025-01-30 PROCEDURE — 93303 ECHO TRANSTHORACIC: CPT | Mod: 26,,, | Performed by: PEDIATRICS

## 2025-01-30 PROCEDURE — 93005 ELECTROCARDIOGRAM TRACING: CPT | Mod: S$GLB,,, | Performed by: PEDIATRICS

## 2025-01-30 PROCEDURE — 99999 PR PBB SHADOW E&M-EST. PATIENT-LVL III: CPT | Mod: PBBFAC,,, | Performed by: PEDIATRICS

## 2025-01-30 PROCEDURE — 93325 DOPPLER ECHO COLOR FLOW MAPG: CPT | Mod: 26,,, | Performed by: PEDIATRICS

## 2025-01-30 NOTE — PROGRESS NOTES
2025    re:Mer Priest  :1975    Venecia Smith MD  [address] [place] [address]    Dr. Michael [facility]     Mer Priest is a 49 y.o. female seen in my [facility] clinic today for evaluation of congenital heart disease.  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  Stop plavix.    She definitely requires SBE prophylaxis.  We discussed the importance of regular dental follow-up and good oral hygiene.  She is going to make a dentist appointment.  She will let me know when in his scheduled, and we can call her in 2 g of amoxicillin to be taken beforehand.  We discussed the signs and symptoms of endocarditis.  Follow-up with me in 1 years with repeat echocardiogram, holter, and EKG.    Continue off metoprolol.  Let me me know if palpitations return.    Discussion:  Her echocardiogram looks great.  The new pulmonary valve is functioning normally.  She clinically feels much better.  She has been off the metoprolol now for several days.  Her blood pressure is fine, and her palpitations, which have been much better since her pulmonary valve replacement, have not returned.  We will keep her off the metoprolol.  She will let me know if she has recurrent palpitations, syncope, or near-syncope.    History of present illness:  She really feel great since her new pulmonary valve was placed.  Her dyspnea on exertion has resolved.  No chest pain.  No palpitations, syncope, cyanosis, edema, fever, night sweats, or other new issues.  She continues on the  aspirin and the Plavix.  She ran out of her metoprolol about 4 days ago, and has not taken it.  She has felt fine off that medication, and she has had no palpitations.    A paternal aunt  as a baby from congenital heart disease.  No other family history of congenital heart disease or sudden death.    Past Medical History:   Diagnosis Date    Benign paroxysmal positional vertigo 2023    GERD without esophagitis 2023    Heart disease     Hypertension     Syphilis 2020    Type 2 diabetes mellitus without complication, without long-term current use of insulin 2024     Past Surgical History:   Procedure Laterality Date    ANGIOGRAM, PULMONARY ARTERIES  2024    Procedure: Angiogram, Pulmonary Arteries;  Surgeon: Lady Pfeiffer III., MD;  Location: [facility] CATH LAB;  Service: Cardiology;;    BILATERAL TUBAL LIGATION      COMBINED RIGHT AND RETROGRADE LEFT HEART CATHETERIZATION FOR CONGENITAL HEART DEFECT N/A 2024    Procedure: Catheterization, Heart, Combined Right and Retrograde Left, for Congenital Heart Defect;  Surgeon: Lady Pfeiffer III., MD;  Location: [facility] CATH LAB;  Service: Cardiology;  Laterality: N/A;    ICE, PERFORMED  2024    Procedure: ICE, Performed;  Surgeon: Lady Pfeiffer III., MD;  Location: [facility] CATH LAB;  Service: Cardiology;;    REPLACE, PULMONARY, VALVE, PEDIATRIC N/A 2024    Procedure: Replace, Pulmonary, Valve, Pediatric;  Surgeon: Lady Pfeiffer III., MD;  Location: [facility] CATH LAB;  Service: Cardiology;  Laterality: N/A;    TETRALOGY OF FALLOT REPAIR       Family History   Problem Relation Name Age of Onset    Prostate cancer Father      Breast cancer Paternal Aunt      Ovarian cancer Maternal Cousin 2nd cousin      Social History     Socioeconomic History    Marital status: Single   Tobacco Use    Smoking status: Former     Current packs/day: 0.00     Types: Cigarettes     Quit date: 2022     Years since quittin.7     Passive  exposure: Current    Smokeless tobacco: Never   Substance and Sexual Activity    Alcohol use: Not Currently    Drug use: Never    Sexual activity: Not Currently     Social Drivers of Health     Financial Resource Strain: High Risk (1/23/2024)    Overall Financial Resource Strain (CARDIA)     Difficulty of Paying Living Expenses: Very hard   Food Insecurity: Food Insecurity Present (1/23/2024)    Hunger Vital Sign     Worried About Running Out of Food in the Last Year: Often true     Ran Out of Food in the Last Year: Often true   Transportation Needs: No Transportation Needs (1/23/2024)    PRAPARE - Transportation     Lack of Transportation (Medical): No     Lack of Transportation (Non-Medical): No   Physical Activity: Inactive (1/23/2024)    Exercise Vital Sign     Days of Exercise per Week: 0 days     Minutes of Exercise per Session: 0 min   Stress: Stress Concern Present (1/23/2024)    [employer] - Occupational Stress Questionnaire     Feeling of Stress : To some extent   Housing Stability: Unknown (1/23/2024)    Housing Stability Vital Sign     Unable to Pay for Housing in the Last Year: No     Unstable Housing in the Last Year: No     Current Outpatient Medications on File Prior to Visit   Medication Sig Dispense Refill    amLODIPine (NORVASC) 5 MG tablet Take 1 tablet by mouth every day 90 tablet 3    aspirin 81 MG Chew Chew and swallow 1 tablet (81 mg total) by mouth once daily. 30 tablet 6    busPIRone (BUSPAR) 7.5 MG tablet TAKE 1 TABLET BY MOUTH 2 TIMES A DAY. (Patient taking differently: Take 7.5 mg by mouth 2 (two) times daily. As needed) 180 tablet 1    cholecalciferol, vitamin D3, 125 mcg (5,000 unit) capsule Take 1 capsule (5,000 Units total) by mouth once daily. 90 capsule 1    clopidogreL (PLAVIX) 75 mg tablet Take 1 tablet (75 mg total) by mouth once daily. 30 tablet 11    gabapentin (NEURONTIN) 300 MG capsule Take 1 capsule (300 mg total) by mouth 3 (three) times daily  "as needed. 90 capsule 1    hydroCHLOROthiazide (HYDRODIURIL) 25 MG tablet Take 1 tablet (25 mg total) by mouth once daily. 90 tablet 1    rosuvastatin (CRESTOR) 5 MG tablet Take 1 tablet (5 mg total) by mouth once daily. 90 tablet 1    tirzepatide (MOUNJARO) 5 mg/0.5 mL PnIj INJECT 5 MG SUBCUTANEOUSLY EVERY 7 DAYS 4 Pen 1    metoprolol succinate (TOPROL-XL) 50 MG 24 hr tablet Take 1 tablet (50 mg total) by mouth 2 (two) times daily. (Patient not taking: Reported on 1/30/2025) 180 tablet 3     No current facility-administered medications on file prior to visit.     Review of patient's allergies indicates:   Allergen Reactions    Ace inhibitors Swelling    Escitalopram Other (See Comments)     Heart palpitations    Lisinopril Other (See Comments)        The review of systems is as noted above. It is otherwise negative for other symptoms related to the general, neurological, psychiatric, endocrine, gastrointestinal, genitourinary, respiratory, dermatologic, musculoskeletal, hematologic, and immunologic systems.    Vitals:    01/30/25 1339   BP: 122/76   BP Location: Right arm   Patient Position: Sitting   Pulse: 78   SpO2: 97%   Weight: 97.4 kg (214 lb 11.7 oz)   Height: 5' 8" (1.727 m)     Wt Readings from Last 3 Encounters:   01/30/25 97.4 kg (214 lb 11.7 oz)   01/30/25 96.2 kg (212 lb)   01/15/25 96.3 kg (212 lb 4.9 oz)     Ht Readings from Last 3 Encounters:   01/30/25 5' 8" (1.727 m)   01/30/25 5' 8" (1.727 m)   01/15/25 5' 8" (1.727 m)     Body mass index is 32.65 kg/m².  Facility age limit for growth %mely is 20 years.  Facility age limit for growth %mely is 20 years.  Facility age limit for growth %mely is 20 years.   In general, she is an obese, very healthy-appearing nondysmorphic female in no apparent distress.  The eyes, nares, and oropharynx are clear.  Eyelids and conjunctiva are normal without drainage or erythema.  Pupils equal and round bilaterally.  The head is normocephalic and atraumatic.  The neck " is supple without jugular venous distention or thyroid enlargement.  The lungs are clear to auscultation bilaterally.  A well-healed sternotomy incision is noted.  First heart sound is normal, 2nd is fixed and split.  A grade 1/6 systolic ejection murmur is followed by blowing grade 2/6 diastolic murmur.  A prominent right ventricular impulse is present.  The abdominal exam is benign without hepatosplenomegaly, tenderness, or distention.  Pulses are normal in all 4 extremities with brisk capillary refill and no clubbing, cyanosis, or edema.  No rashes are noted.    I personally reviewed the following tests performed today and my interpretation follows:  EKG reveals normal sinus rhythm with a right bundle branch block.  QRS duration 160 milliseconds.    Results for orders placed during the hospital encounter of 01/30/25    Echo    Interpretation Summary  CONGENITAL CARDIAC HISTORY:  Tetralogy of Fallot  S/P Repair at [age] years old -  Dr Carroll at [facility].  S/P [place]ese TPV 25 pulmonary valve implantation - [place] (4/23/2024).    SEGMENTAL CARDIAC CONNECTIONS (previously demonstrated):  Abdominal situs solitus.  Atrial situs solitus.  Atrioventricular alignment is concordant.  D-loop ventricles.  Normal tricuspid valve with large annulus..  The mitral valve appears grossly normal.  There is severe dilation of the right ventricle.  The left ventricle appears qualitatively normal.  The ventriculoarterial alignment is concordant.  Images consistent with percutaneous valve in pulmonary position.  Normal size aortic valve annulus with structure of the valve not well defined.  Cardiac position is levocardia.  There is a left aortic arch with additional branches not well demonstrated.  No coarctation is demonstrated.      IMPRESSION:  Single wide QRS ectopic beats noted during this study-  Echodensity consistent with patch repair of atrial septal defect  and no residual shunt demonstrated.  Qualitative impression  of mildly dilated right atrium.  Large tricuspid valve annulus with no evidence of stenosis and mild insufficiency..  Qualitative impression of moderate to severely dilated right ventricle with preserved systolic function.  Right ventricular pressure estimated >32 mmHg above right atrial pressure from well defined TR doppler profile.  Echodensity consistent with patch repair of ventricular septal defect and malalignment of the ventricular septum with no residual shunt demonstrated.  Imaging consistent with percutaneous valve in pulmonary position.  Peak velocity across the right ventricular outflow tract and pulmonary valve <2.2 m/sec with mean gradient <12 mm Hg and trivial jet of insufficiency.  Confluent pulmonary artery branches previously demonstrated with no evidence of stenosis.  The left atrial volume index is normal measuring 24 ml/m2.  Qualitative impression of normal mitral valve structure with trivial to mild insufficiency by color Doppler.  There is evidence for mild concentric left ventricular hypertrophy with otherwise normal left ventricular structure.  Paradoxical septal motion with good movement of the LV free wall, SF = 37% and EF estimated 55 -60% from apical views.  Global left ventricular longitudinal strain measured abnormal but tracked poorly..  The structure of the aortic valve is not well demonstrated with no evidence of stenosis and a trivial jet of insufficiency.  Aortic dimensions:  Sinuses of Valsalva  = 36 mm.  ST junction not well demonstrated.  Ascending aorta       = 31 mm.  Technically difficult suprasternal images demonstrated normal-size aorta with no evidence of coarctation.  No pericardial effusion.      Cath 4/23/24:  IMPRESSION:  1) Repaired tetralogy of Fallot via trans annular patch with free pulmonary insufficiency and severe RV enlargement  2) Mildly elevated PA pressure with normal resistance.  Normal cardiac output  3) Harmonary TPV 25 pulmonary valve implantation. No  stenosis or insufficiency      Holter 7/25/23:    The predominant rhythm is sinus.    Heart rates varied between 54 and 88 BPM with an average of 67 BPM.    Patient Reported Event #1 There were no patient reported events    9 runs of VT, longest 3 beats    356 runs of SVT longest 7 beats    MRI 6/24/22 at [facility]:  Repaired tetralogy of Fallot with dilated right ventricle  mL (normal ), EDV/.15 mL/m2 (normal 57-95). Mildly decreased right ventricular function, RVEF =38%.   Mild left ventricular dilatation, mild to moderately reduced left ventricular function, LVEF =45%.   Small diverticulum of the anterior septum.   Late gadolinium enhancement at the right ventricular outflow tract likely related to patch repair.   Severely dilated right ventricular outflow tract with free pulmonic insufficiency.     Thank you for referring this patient to our clinic.  Please call with any questions.    Sincerely,        Amrik Roberts MD  Pediatric Cardiology  Adult Congenital Heart Disease  Pediatric Heart Failure and Transplantation  [facility] [address] [place]  [address]  [phone]

## 2025-01-31 ENCOUNTER — OFFICE VISIT (OUTPATIENT)
Dept: OPHTHALMOLOGY | Facility: CLINIC | Age: 50
End: 2025-01-31
Payer: COMMERCIAL

## 2025-01-31 DIAGNOSIS — H52.203 MYOPIA WITH ASTIGMATISM AND PRESBYOPIA, BILATERAL: ICD-10-CM

## 2025-01-31 DIAGNOSIS — E11.9 TYPE 2 DIABETES MELLITUS WITHOUT RETINOPATHY: Primary | ICD-10-CM

## 2025-01-31 DIAGNOSIS — H52.13 MYOPIA WITH ASTIGMATISM AND PRESBYOPIA, BILATERAL: ICD-10-CM

## 2025-01-31 DIAGNOSIS — H52.4 MYOPIA WITH ASTIGMATISM AND PRESBYOPIA, BILATERAL: ICD-10-CM

## 2025-01-31 PROCEDURE — 99999 PR PBB SHADOW E&M-EST. PATIENT-LVL II: CPT | Mod: PBBFAC,,, | Performed by: OPTOMETRIST

## 2025-01-31 PROCEDURE — 99499 UNLISTED E&M SERVICE: CPT | Mod: S$GLB,,, | Performed by: OPTOMETRIST

## 2025-02-03 NOTE — PROGRESS NOTES
HPI     Diabetic Eye Exam            Comments: Pt presents for an annual diabetic exam; w/o medication.    Blurred distance and near; current PAL ~1 year old; not seeing as well w/   them.    Longstanding/ stable floaters--- ? Possible flashes in the last 6 months.     Long term dryness; worse in the pm with watering.     Lab Results       Component                Value               Date                       HGBA1C                   5.6                 10/01/2024                    Comments    DM (2023)          Last edited by Ratna Campbell on 1/31/2025  4:19 PM.            Assessment /Plan     For exam results, see Encounter Report.    Type 2 diabetes mellitus without retinopathy  No diabetic retinopathy was seen in either eye today. Reviewed importance of yearly dilated eye exams. Continue close care with PCP regarding diabetes.    Myopia with astigmatism and presbyopia, bilateral  Eyeglass Final Rx       Eyeglass Final Rx         Sphere Cylinder Axis Add    Right -1.00 +0.75 180 +1.75    Left -0.75 +0.75 178 +1.75      Expiration Date: 1/31/2026                      RTC 1 yr for dilated eye exam or PRN if any problems.   Discussed above and answered questions.

## 2025-02-05 PROBLEM — E11.29 TYPE 2 DIABETES MELLITUS WITH PROTEINURIA: Status: ACTIVE | Noted: 2024-09-25

## 2025-02-05 PROBLEM — R80.9 TYPE 2 DIABETES MELLITUS WITH PROTEINURIA: Status: ACTIVE | Noted: 2024-09-25

## 2025-02-26 ENCOUNTER — HOSPITAL ENCOUNTER (OUTPATIENT)
Dept: PREADMISSION TESTING | Facility: HOSPITAL | Age: 50
Discharge: HOME | End: 2025-02-26
Attending: INTERNAL MEDICINE
Payer: COMMERCIAL

## 2025-02-26 DIAGNOSIS — Z12.11 COLON CANCER SCREENING: ICD-10-CM

## 2025-04-06 ENCOUNTER — PATIENT MESSAGE (OUTPATIENT)
Dept: ADMINISTRATIVE | Facility: OTHER | Age: 50
End: 2025-04-06
Payer: COMMERCIAL

## 2025-04-14 PROBLEM — E78.2 MIXED HYPERLIPIDEMIA: Status: ACTIVE | Noted: 2025-04-14

## 2025-05-02 ENCOUNTER — PATIENT MESSAGE (OUTPATIENT)
Dept: CARDIOLOGY | Facility: CLINIC | Age: 50
End: 2025-05-02
Payer: COMMERCIAL

## 2025-05-02 RX ORDER — METOPROLOL TARTRATE 50 MG/1
50 TABLET ORAL 2 TIMES DAILY
Qty: 180 TABLET | Refills: 3 | Status: SHIPPED | OUTPATIENT
Start: 2025-05-02 | End: 2026-05-02

## 2025-05-13 ENCOUNTER — PATIENT MESSAGE (OUTPATIENT)
Dept: CARDIOLOGY | Facility: CLINIC | Age: 50
End: 2025-05-13
Payer: COMMERCIAL

## 2025-07-28 ENCOUNTER — OFFICE VISIT (OUTPATIENT)
Dept: URGENT CARE | Facility: CLINIC | Age: 50
End: 2025-07-28
Payer: COMMERCIAL

## 2025-07-28 VITALS
HEART RATE: 66 BPM | OXYGEN SATURATION: 96 % | DIASTOLIC BLOOD PRESSURE: 90 MMHG | RESPIRATION RATE: 18 BRPM | SYSTOLIC BLOOD PRESSURE: 165 MMHG | TEMPERATURE: 98 F

## 2025-07-28 DIAGNOSIS — G58.8 PINCHED VERTEBRAL NERVE: Primary | ICD-10-CM

## 2025-07-28 DIAGNOSIS — S33.5XXA LOW BACK SPRAIN, INITIAL ENCOUNTER: ICD-10-CM

## 2025-07-28 PROCEDURE — 99213 OFFICE O/P EST LOW 20 MIN: CPT | Mod: S$GLB,,, | Performed by: FAMILY MEDICINE

## 2025-07-28 RX ORDER — MELOXICAM 15 MG/1
15 TABLET ORAL DAILY
Qty: 10 TABLET | Refills: 0 | Status: SHIPPED | OUTPATIENT
Start: 2025-07-28

## 2025-07-28 RX ORDER — METHOCARBAMOL 500 MG/1
500 TABLET, FILM COATED ORAL EVERY 8 HOURS PRN
Qty: 15 TABLET | Refills: 0 | Status: SHIPPED | OUTPATIENT
Start: 2025-07-28

## 2025-07-28 RX ORDER — PREDNISONE 20 MG/1
20 TABLET ORAL 2 TIMES DAILY
Qty: 8 TABLET | Refills: 0 | Status: SHIPPED | OUTPATIENT
Start: 2025-07-28 | End: 2025-08-01

## 2025-07-28 NOTE — PROGRESS NOTES
Subjective:      Patient ID: Mer Priest is a 50 y.o. female.    Vitals:  oral temperature is 98.4 °F (36.9 °C). Her blood pressure is 165/90 (abnormal) and her pulse is 66. Her respiration is 18 and oxygen saturation is 96%.     Chief Complaint: Back Pain    51 yo female PT presents to clinic and states she has been having low back pain about 6 days mostly along the left lower back. PT states she has been taking ibuprofen and using heat therapy which didn't help. PT states that she was lifting her leg up this morning to dry it from the shower when it felt like a pop. No direct trauma or injury. PT states that movement hurts the right lower back now. It's not radiating down the leg. She did have some tingling in the right leg then but that cleared. No fever. No gi or gu symptoms. It hurts to stand and walk. She had ibuprofen 400 mg today. Heat has helped some. She does remember having low back episode years ago.     Back Pain  This is a new problem. The current episode started in the past 7 days. The problem occurs constantly. The problem has been gradually worsening since onset. The quality of the pain is described as aching. The pain is at a severity of 10/10. The pain is The same all the time. The symptoms are aggravated by stress, twisting and position. Stiffness is present All day. Pertinent negatives include no abdominal pain, bladder incontinence, bowel incontinence, chest pain, dysuria, fever, headaches, leg pain, numbness, paresis, paresthesias, pelvic pain, perianal numbness, tingling, weakness or weight loss. She has tried heat and NSAIDs for the symptoms. The treatment provided mild relief.       Constitution: Negative for sweating, fatigue and fever.   HENT: Negative.     Cardiovascular: Negative.  Negative for chest pain.   Respiratory: Negative.     Gastrointestinal: Negative.  Negative for abdominal pain and bowel incontinence.   Genitourinary: Negative.  Negative for dysuria, bladder incontinence  and pelvic pain.   Musculoskeletal:  Positive for back pain. Negative for trauma.   Skin:  Negative for rash.   Neurological:  Negative for headaches and numbness.      Objective:     Physical Exam   Constitutional: She is oriented to person, place, and time.   HENT:   Nose: Nose normal.   Mouth/Throat: Oropharynx is clear.   Eyes: Conjunctivae are normal.   Neck: Neck supple.   Cardiovascular: Normal rate, regular rhythm, normal heart sounds and normal pulses.   Pulmonary/Chest: Effort normal and breath sounds normal.   Abdominal: Normal appearance. Soft. There is no abdominal tenderness.   Musculoskeletal:         General: No swelling or deformity.      Comments: Tenderness over the bilateral lumbar paraspinal muscles right more than the left. Local muscle spasm. No spinal tenderness. Pain along the right side with anterior flexion at about 70 degrees, extension and lateral right flexion. Negative straight leg raise today. Gait is slow but normal.    Neurological: no focal deficit. She is alert and oriented to person, place, and time.   Skin: Skin is no rash.         Comments: Normal turgor   Psychiatric: Her behavior is normal. Mood, judgment and thought content normal.   Nursing note and vitals reviewed.      Assessment:     1. Pinched vertebral nerve    2. Low back sprain, initial encounter        Plan:       Pinched vertebral nerve  -     Ambulatory referral/consult to Sports Medicine    Low back sprain, initial encounter  -     Ambulatory referral/consult to Sports Medicine    Other orders  -     meloxicam (MOBIC) 15 MG tablet; Take 1 tablet (15 mg total) by mouth once daily.  Dispense: 10 tablet; Refill: 0  -     predniSONE (DELTASONE) 20 MG tablet; Take 1 tablet (20 mg total) by mouth 2 (two) times daily. for 4 days  Dispense: 8 tablet; Refill: 0  -     methocarbamoL (ROBAXIN) 500 MG Tab; Take 1 tablet (500 mg total) by mouth every 8 (eight) hours as needed (muscle spasm. can cause sleepiness).  Dispense:  15 tablet; Refill: 0      Review of patient's allergies indicates:   Allergen Reactions    Ace inhibitors Swelling    Escitalopram Other (See Comments)     Heart palpitations    Lisinopril Other (See Comments)         SUMMARY:  See hpi  Possibly degenerative disc or bulging disc. Historically seems to have had low back problems in the past. Adding above. Injection declined today. She is on a baby aspirin daily but no rx blood thinner. No aggravating activities. BP is up but most likely from acute pain. Heating pad can be continued. Will request sports med/ortho appointment for continued workup and management. Immediate medical provider evaluation if any worsening prior. Followup here as needed.     Patient Instructions     Thank you for allowing our team to take care of you today.  The diagnosis is lower back pain and sprain from pinching of the nerve.   Adding Meloxicam anti inflammatory to take once a day (use this in place of the over the counter ibuprofen or tylenol taken).  Prednisone steroid twice a day for four days.  Robaxin muscle relaxer for spasm of the muscles every 8 hours if needed. This can cause sleepiness.   No aggravating activities.  Ice pack or Heating pad, whichever helps most.  Placed request for appointment with sports medicine/orthopedist. Our [facility] schedulers will contact you in regards to this appointment.   Immediate medical provider evaluation if worsens before that appointment.  Followup here as needed.

## 2025-07-28 NOTE — PATIENT INSTRUCTIONS
Thank you for allowing our team to take care of you today.  The diagnosis is lower back pain and sprain from pinching of the nerve.   Adding Meloxicam anti inflammatory to take once a day (use this in place of the over the counter ibuprofen or tylenol taken).  Prednisone steroid twice a day for four days.  Robaxin muscle relaxer for spasm of the muscles every 8 hours if needed. This can cause sleepiness.   No aggravating activities.  Ice pack or Heating pad, whichever helps most.  Placed request for appointment with sports medicine/orthopedist. Our [facility] schedulers will contact you in regards to this appointment.   Immediate medical provider evaluation if worsens before that appointment.  Followup here as needed.

## 2025-07-28 NOTE — LETTER
July 28, 2025    Mer Priest  [address]  [place] [address]             [facility]  Urgent Care  [address], [address]  [place] [address]  Phone: [phone]   July 28, 2025     Patient: Mer Priest   YOB: 1975   Date of Visit: 7/28/2025       To Whom it May Concern:    Mer Priest was seen in my clinic on 7/28/2025. She may return to work on 07/31/2025.    Please excuse her from any work missed.    If you have any questions or concerns, please don't hesitate to call.    Sincerely,         Deepa Hayes MD

## 2025-07-30 ENCOUNTER — OFFICE VISIT (OUTPATIENT)
Dept: ORTHOPEDICS | Facility: CLINIC | Age: 50
End: 2025-07-30
Payer: COMMERCIAL

## 2025-07-30 ENCOUNTER — HOSPITAL ENCOUNTER (OUTPATIENT)
Dept: RADIOLOGY | Facility: HOSPITAL | Age: 50
Discharge: HOME | End: 2025-07-30
Attending: NURSE PRACTITIONER
Payer: COMMERCIAL

## 2025-07-30 VITALS — HEIGHT: 68 IN | BODY MASS INDEX: 31.07 KG/M2 | WEIGHT: 205 LBS

## 2025-07-30 DIAGNOSIS — M54.50 LUMBAR PAIN: Primary | ICD-10-CM

## 2025-07-30 DIAGNOSIS — M54.50 LUMBAR PAIN: ICD-10-CM

## 2025-07-30 DIAGNOSIS — M51.362 DEGENERATION OF INTERVERTEBRAL DISC OF LUMBAR REGION WITH DISCOGENIC BACK PAIN AND LOWER EXTREMITY PAIN: Primary | ICD-10-CM

## 2025-07-30 PROCEDURE — 3066F NEPHROPATHY DOC TX: CPT | Mod: CPTII,S$GLB,, | Performed by: NURSE PRACTITIONER

## 2025-07-30 PROCEDURE — 97110 THERAPEUTIC EXERCISES: CPT | Mod: S$GLB,,, | Performed by: NURSE PRACTITIONER

## 2025-07-30 PROCEDURE — 3044F HG A1C LEVEL LT 7.0%: CPT | Mod: CPTII,S$GLB,, | Performed by: NURSE PRACTITIONER

## 2025-07-30 PROCEDURE — 3008F BODY MASS INDEX DOCD: CPT | Mod: CPTII,S$GLB,, | Performed by: NURSE PRACTITIONER

## 2025-07-30 PROCEDURE — 99999 PR PBB SHADOW E&M-EST. PATIENT-LVL IV: CPT | Mod: PBBFAC,,, | Performed by: NURSE PRACTITIONER

## 2025-07-30 PROCEDURE — 72100 X-RAY EXAM L-S SPINE 2/3 VWS: CPT | Mod: 26,,, | Performed by: RADIOLOGY

## 2025-07-30 PROCEDURE — 99204 OFFICE O/P NEW MOD 45 MIN: CPT | Mod: S$GLB,,, | Performed by: NURSE PRACTITIONER

## 2025-07-30 PROCEDURE — 1159F MED LIST DOCD IN RCRD: CPT | Mod: CPTII,S$GLB,, | Performed by: NURSE PRACTITIONER

## 2025-07-30 PROCEDURE — 3061F NEG MICROALBUMINURIA REV: CPT | Mod: CPTII,S$GLB,, | Performed by: NURSE PRACTITIONER

## 2025-07-30 PROCEDURE — 72100 X-RAY EXAM L-S SPINE 2/3 VWS: CPT | Mod: TC,PO

## 2025-07-30 RX ORDER — METHOCARBAMOL 750 MG/1
750 TABLET, FILM COATED ORAL
Qty: 60 TABLET | Refills: 1 | Status: SHIPPED | OUTPATIENT
Start: 2025-07-30

## 2025-07-30 RX ORDER — CELECOXIB 200 MG/1
200 CAPSULE ORAL 2 TIMES DAILY
Qty: 60 CAPSULE | Refills: 1 | Status: SHIPPED | OUTPATIENT
Start: 2025-07-30

## 2025-07-30 NOTE — PROGRESS NOTES
Keara Tinajero NP  [facility] [place]          Chief Complaint:   Chief Complaint   Patient presents with    Spine - Pain     LBP x 1 week  Pain 3/10        History of Present Illness: Mer Priest is a 50 y.o. female   went to an urgent care 2 days ago for low back pain that has been present the past 7-10 days.  On her urgent care visit 07/28/2025 she was prescribed meloxicam , prednisone and application of heat has helped but for some reason she noticed methocarbamol did not help.  She states she has about 8 tablets left of the meloxicam and the prednisone.  Although the medicines have helped she is still hurting.  She did feel a pop on her right side and she has had pain in the right and left glute with radiation of some symptoms into the thighs.  No ganesh numbness and tingling    ROS:   Neuro: Awake, alert and oriented  Pulm: no resp distress  Muscloskeletal:  Low back pain    Past Medical History:   Past Medical History:   Diagnosis Date    Benign paroxysmal positional vertigo 07/25/2023    GERD without esophagitis 07/25/2023    Heart disease     Hypertension     Syphilis 11/2020    Type 2 diabetes mellitus without complication, without long-term current use of insulin 09/25/2024      Past Surgical History:   Procedure Laterality Date    ANGIOGRAM, PULMONARY ARTERIES  04/23/2024    Procedure: Angiogram, Pulmonary Arteries;  Surgeon: Lady Pfeiffer III., MD;  Location: [facility] CATH LAB;  Service: Cardiology;;    BILATERAL TUBAL LIGATION      COMBINED RIGHT AND RETROGRADE LEFT HEART CATHETERIZATION FOR CONGENITAL HEART DEFECT N/A 04/23/2024    Procedure: Catheterization, Heart, Combined Right and Retrograde Left, for Congenital Heart Defect;  Surgeon: Lady Pfeiffer III., MD;  Location: [facility] CATH LAB;  Service: Cardiology;  Laterality: N/A;    ICE, PERFORMED  04/23/2024    Procedure: ICE, Performed;  Surgeon: Lady Pfeiffer III., MD;  Location: [facility] CATH LAB;  Service:  Cardiology;;    REPLACE, PULMONARY, VALVE, PEDIATRIC N/A 04/23/2024    Procedure: Replace, Pulmonary, Valve, Pediatric;  Surgeon: Lady Pfeiffer III., MD;  Location: [facility] CATH LAB;  Service: Cardiology;  Laterality: N/A;    TETRALOGY OF FALLOT REPAIR  1977      Family History   Problem Relation Name Age of Onset    Prostate cancer Father      Breast cancer Paternal Aunt      Ovarian cancer Maternal Cousin 2nd cousin       Social History[1]   Medication List with Changes/Refills   New Medications    CELECOXIB (CELEBREX) 200 MG CAPSULE    Take 1 capsule (200 mg total) by mouth 2 (two) times daily.    METHOCARBAMOL (ROBAXIN) 750 MG TAB    Take 1 tablet (750 mg total) by mouth after meals as needed (2-3 times daily if needed).   Current Medications    AMLODIPINE (NORVASC) 5 MG TABLET    Take 1 tablet by mouth every day    ASPIRIN 81 MG CHEW    Chew and swallow 1 tablet (81 mg total) by mouth once daily.    BUSPIRONE (BUSPAR) 7.5 MG TABLET    TAKE 1 TABLET BY MOUTH 2 TIMES A DAY.    CHOLECALCIFEROL, VITAMIN D3, 125 MCG (5,000 UNIT) CAPSULE    Take 1 capsule (5,000 Units total) by mouth once daily.    FERROUS SULFATE (FEOSOL) 325 MG (65 MG IRON) TAB TABLET    Take 1 tablet (325 mg total) by mouth daily with breakfast.    GABAPENTIN (NEURONTIN) 300 MG CAPSULE    Take 1 capsule (300 mg total) by mouth 3 (three) times daily as needed.    HYDROCHLOROTHIAZIDE (HYDRODIURIL) 25 MG TABLET    TAKE 1 TABLET BY MOUTH EVERY DAY    MELOXICAM (MOBIC) 15 MG TABLET    Take 1 tablet (15 mg total) by mouth once daily.    METHOCARBAMOL (ROBAXIN) 500 MG TAB    Take 1 tablet (500 mg total) by mouth every 8 (eight) hours as needed (muscle spasm. can cause sleepiness).    METOPROLOL TARTRATE (LOPRESSOR) 50 MG TABLET    Take 1 tablet (50 mg total) by mouth 2 (two) times daily.    PREDNISONE (DELTASONE) 20 MG TABLET    Take 1 tablet (20 mg total) by mouth 2 (two) times daily. for 4 days    ROSUVASTATIN (CRESTOR) 5 MG TABLET    Take 1 tablet (5  "mg total) by mouth once daily.    TIRZEPATIDE 7.5 MG/0.5 ML PNIJ    Inject 7.5 mg into the skin every 7 days.      Review of patient's allergies indicates:   Allergen Reactions    Ace inhibitors Swelling    Escitalopram Other (See Comments)     Heart palpitations    Lisinopril Other (See Comments)          Physical Exam:   BMI: Body mass index is 31.17 kg/m². 50 y.o. female  5' 8" (1.727 m) 93 kg (205 lb 0.4 oz) Visual inspection of the lumbar spine/back reveals no kyphosis, no obvious scoliosis.  The patient has a non antalgic gait. There is mild active paravertebral spasm.  The patient can independently arise from a sitting to a standing position. Range of motion demonstrates decreased ability to perform full tarsal twist and lateral bending as there is muscle tension and tightness.  Straight leg raise is positive.  Strength, and sensation and circulation are intact.       Imaging: Relevant imaging results reviewed and interpreted and discussed with the patient and/or family today.   X-Ray Lumbar Spine AP And Lateral  Narrative: EXAMINATION:  XR LUMBAR SPINE AP AND LATERAL    CLINICAL HISTORY:  Low back pain, unspecified    TECHNIQUE:  AP, lateral and spot images were performed of the lumbar spine.    COMPARISON:  None    FINDINGS:  Vertebral body heights maintained.  Trace retrolisthesis of L5 on S1.  Disc spaces relatively maintained.  Lower lumbar spine facet arthropathy.  No acute osseous abnormality.  Constipation.  Impression: Degenerative findings    Electronically signed by: Elver Flores MD  Date:    07/30/2025  Time:    12:14       Assessment/Plan:  1. Degeneration of intervertebral disc of lumbar region with discogenic back pain and lower extremity pain    Other orders  -     celecoxib (CELEBREX) 200 MG capsule; Take 1 capsule (200 mg total) by mouth 2 (two) times daily.  Dispense: 60 capsule; Refill: 1  -     methocarbamoL (ROBAXIN) 750 MG Tab; Take 1 tablet (750 mg total) by mouth after meals as " needed (2-3 times daily if needed).  Dispense: 60 tablet; Refill: 1       Patient Instructions   PT/OT:   I will send the patient to formal physical therapy with [facility]      Medications:     Increased methocarbamol to 750 mg 2-3 times daily  Once she finishes meloxicam and Medrol Dosepak she is to begin   Celebrex 200 mg b.i.d.    Education:    I personally spent 8 minutes developing, teaching, performing and explaining a patient self directed home exercise/stretching regimen for hamstring and back stretching .  I explained the patient can begin this regimen prior to beginning any PT sessions. Patient demonstrated understanding. All questions were answered and counseling provided to encourage patient to do these daily. CPT 05637-TU       Return to clinic:    4 weeks if needed      I discussed worrisome and red flag signs and symptoms with the patient. The patient expressed understanding and agreed to alert me immediately or to go to the emergency room if they experience any of these.   Treatment plan was developed with input from the patient/family, and they expressed understanding and agreement with the plan. All questions were answered today.             Keara Tinajero NP-C  Orthopedic Nurse Pracitioner  [place]         [1]   Social History  Socioeconomic History    Marital status: Single   Tobacco Use    Smoking status: Former     Current packs/day: 0.00     Types: Cigarettes     Quit date: 05/2022     Years since quitting: 3.2     Passive exposure: Current    Smokeless tobacco: Never   Substance and Sexual Activity    Alcohol use: Not Currently    Drug use: Never    Sexual activity: Not Currently     Social Drivers of Health     Financial Resource Strain: High Risk (1/23/2024)    Overall Financial Resource Strain (CARDIA)     Difficulty of Paying Living Expenses: Very hard   Food Insecurity: Food Insecurity Present (1/23/2024)    Hunger Vital Sign     Worried About  Running Out of Food in the Last Year: Often true     Ran Out of Food in the Last Year: Often true   Transportation Needs: No Transportation Needs (1/23/2024)    PRAPARE - Transportation     Lack of Transportation (Medical): No     Lack of Transportation (Non-Medical): No   Physical Activity: Inactive (1/23/2024)    Exercise Vital Sign     Days of Exercise per Week: 0 days     Minutes of Exercise per Session: 0 min   Stress: Stress Concern Present (1/23/2024)    [employer] - Occupational Stress Questionnaire     Feeling of Stress : To some extent   Housing Stability: Unknown (1/23/2024)    Housing Stability Vital Sign     Unable to Pay for Housing in the Last Year: No     Unstable Housing in the Last Year: No

## 2025-07-30 NOTE — PATIENT INSTRUCTIONS
PT/OT:   I will send the patient to formal physical therapy with Sures[facility], [facility]      Medications:     Increased methocarbamol to 750 mg 2-3 times daily  Once she finishes meloxicam and Medrol Dosepak she is to begin   Celebrex 200 mg b.i.d.    Education:    I personally spent 8 minutes developing, teaching, performing and explaining a patient self directed home exercise/stretching regimen for hamstring and back stretching .  I explained the patient can begin this regimen prior to beginning any PT sessions. Patient demonstrated understanding. All questions were answered and counseling provided to encourage patient to do these daily. CPT 81914-FS       Return to clinic:    4 weeks if needed

## 2025-08-06 ENCOUNTER — CLINICAL SUPPORT (OUTPATIENT)
Dept: REHABILITATION | Facility: HOSPITAL | Age: 50
End: 2025-08-06
Payer: COMMERCIAL

## 2025-08-06 DIAGNOSIS — M53.86 DECREASED RANGE OF MOTION OF INTERVERTEBRAL DISCS OF LUMBAR SPINE: Primary | ICD-10-CM

## 2025-08-06 DIAGNOSIS — R29.898 WEAKNESS OF LOWER EXTREMITY, UNSPECIFIED LATERALITY: ICD-10-CM

## 2025-08-06 DIAGNOSIS — M51.362 DEGENERATION OF INTERVERTEBRAL DISC OF LUMBAR REGION WITH DISCOGENIC BACK PAIN AND LOWER EXTREMITY PAIN: ICD-10-CM

## 2025-08-06 PROCEDURE — 97162 PT EVAL MOD COMPLEX 30 MIN: CPT | Mod: PN

## 2025-08-06 PROCEDURE — 97110 THERAPEUTIC EXERCISES: CPT | Mod: PN

## 2025-08-06 NOTE — PROGRESS NOTES
Outpatient Rehab    Physical Therapy Evaluation    Patient Name: Mer Priest  MRN: [id number]  YOB: 1975  Encounter Date: 8/6/2025    Therapy Diagnosis:   Encounter Diagnoses   Name Primary?    Degeneration of intervertebral disc of lumbar region with discogenic back pain and lower extremity pain     Decreased range of motion of intervertebral discs of lumbar spine Yes    Weakness of lower extremity, unspecified laterality      Physician: Keara Tinajero, NP    Physician Orders: Eval and Treat  Medical Diagnosis: Degeneration of intervertebral disc of lumbar region with discogenic back pain and lower extremity pain  Surgical Diagnosis: Not applicable for this Episode   Surgical Date: Not applicable for this Episode  Days Since Last Surgery: Not applicable for this Episode    Visit # / Visits Authorized:  1 / 1  Insurance Authorization Period: 7/30/2025 to 7/30/2026  Date of Evaluation: 8/6/2025  Plan of Care Certification: 8/6/2025 to 10/2/2025     Time In: 1505   Time Out: 1600  Total Time (in minutes): 55   Total Billable Time (in minutes): 55    Intake Outcome Measure for FOTO Survey    Therapist reviewed FOTO scores for Mer Priest on 8/6/2025.   FOTO report - see Media section or FOTO account episode details.     Intake Score (%): 79    Precautions: standard        Subjective   History of Present Illness  Mer is a 50 y.o. female who reports to physical therapy with a chief concern of B back pain.     The patient reports a medical diagnosis of M51.362 (ICD-10-CM) - Degeneration of intervertebral disc of lumbar region with discogenic back pain and lower extremity pain.                      History of Present Condition/Illness: Patient went to urgent care on 07/28/2025 for back pain. She was prescribed meloxicam and prednisone. Patient reports application of heat has helped but no improvement with meloxicam. She reports she is getting old. The Tuesday before she went to urgent care she  was sitting on a bar stool. She was dancing and twisting to talk to people. Friday aching in her back was almost unbearable. Monday went to prop leg on toilet to dry off and heard a pop. She went into work and needed a wheelchair to get to her car. RUDY Sarah prescribed Celebrex which is for arthritis. She was on monjaro for weight loss, but steroids for back made her gain weight back. She thought her pain was scaitica from having 5 kids. In 2019 her leg was randomly giving out but resolved.          Pain     Patient reports a current pain level of 0/10. Pain at best is reported as 0/10. Pain at worst is reported as 10/10.   Location: B low back  Clinical Progression (since onset): Improved  Pain Qualities: Dull, Aching  Pain-Relieving Factors: Medications - prescription, Heat  Pain-Aggravating Factors: Sitting, Other (Comment), Standing  Other Pain-Aggravating Factors: lack of back support         Employment  Does the patient's condition impact their ability to work?: Yes  Employment Status: Employed full-time  [employer]       Past Medical History/Physical Systems Review:   Mer Priest  has a past medical history of Benign paroxysmal positional vertigo, GERD without esophagitis, Heart disease, Hypertension, Syphilis, and Type 2 diabetes mellitus without complication, without long-term current use of insulin.    Mer Priest  has a past surgical history that includes Tetralogy of Fallot repair (1977); Bilateral tubal ligation; Combined right and retrograde left heart catheterization for congenital heart defect (N/A, 04/23/2024); replace, pulmonary, valve, pediatric (N/A, 04/23/2024); angiogram, pulmonary arteries (04/23/2024); and ice, performed (04/23/2024).    Mer has a current medication list which includes the following prescription(s): amlodipine, aspirin, buspirone, celecoxib, cholecalciferol (vitamin d3), ferrous sulfate, gabapentin, hydrochlorothiazide, meloxicam, methocarbamol, methocarbamol,  "metoprolol tartrate, rosuvastatin, and tirzepatide.    Review of patient's allergies indicates:   Allergen Reactions    Ace inhibitors Swelling    Escitalopram Other (See Comments)     Heart palpitations    Lisinopril Other (See Comments)        Objective      Lumbar Range of Motion   Active (% of normal range of motion) Passive (deg) Pain   Flexion 80       Extension 75       Right Lateral Flexion 60       Right Rotation 75       Left Lateral Flexion 60       Left Rotation 75                           Hip Strength - Planes of Motion   Right Strength Right Pain Left Strength Left  Pain   Flexion (L2) 4+   4+     Extension           ABduction 3+   3+     ADduction 3+   3+     Internal Rotation           External Rotation               Knee Strength   Right Strength Right Pain Left Strength Left  Pain   Flexion (S2) 4   4     Prone Flexion           Extension (L3) 4+   4+            Ankle/Foot Strength - Planes of Motion   Right Strength Right Pain Left Strength Left  Pain   Dorsiflexion (L4) 4+   4+     Plantar Flexion (S1) 4+   4+     Inversion           Eversion           Great Toe Flexion           Great Toe Extension (L5)           Lesser Toes Flexion           Lesser Toes Extension                  Lumbar/Pelvic Girdle Special Tests            Other Lumbar Tests  Negative: Lumbar Vertical Compression, Right Quadrant, and Left Quadrant                     Treatment:  Therapeutic Exercise  TE 1: piriformis stretch 2 x 30"  TE 2: recumbent bike x 5 minutes  TE 3: steam boats x 10  TE 4: bridges x 10  TE 5: shot gun 5" x 10  TE 6: RTB SL clams x 10  TE 7: LTR x 2 minutes      Time Entry(in minutes):  PT Evaluation (Moderate) Time Entry: 30  Therapeutic Exercise Time Entry: 25    Assessment & Plan   Assessment  Mer presents with a condition of Moderate complexity.   Presentation of Symptoms: Changing  Will Comorbidities Impact Care: Yes  See co-morbidities listed above     Functional Limitations: Activity " tolerance, Decreased ambulation distance/endurance, Completing work/school activities, Completing self-care activities, Range of motion, Pain with ADLs/IADLs, Participating in leisure activities, Functional mobility  Impairments: Abnormal muscle tone, Pain with functional activity, Abnormal or restricted range of motion, Impaired physical strength  Personal Factors Affecting Prognosis: Pain    Prognosis: Good  Assessment Details: Patient presents with signs and symptoms consistent with SI joint dysfunction.     Plan  From a physical therapy perspective, the patient would benefit from: Skilled Rehab Services    Planned therapy interventions include: Therapeutic exercise, Therapeutic activities, Neuromuscular re-education, Manual therapy, and ADLs/IADLs.            Visit Frequency: 2 times Per Week for 8 Weeks.       This plan was discussed with Patient.   Discussion participants: Agreed Upon Plan of Care             The patient's spiritual, cultural, and educational needs were considered, and the patient is agreeable to the plan of care and goals.           Goals:   Active       Functional outcome       Patient will show a significant change in FOTO patient-reported outcome tool to demonstrate subjective improvement       Start:  08/07/25    Expected End:  10/02/25            Patient will demonstrate independence in home program for support of progression       Start:  08/07/25    Expected End:  10/02/25               Pain       Patient will report pain of 5/10 at its worse demonstrating a reduction of overall pain       Start:  08/07/25    Expected End:  10/02/25               Range of Motion       Patient will achieve full pain free bilateral spinal side bending range of motion for maximal function       Start:  08/07/25    Expected End:  10/02/25            Patient will achieve full pain free bilateral spinal rotation range of motion for maximal function and quality of life        Start:  08/07/25    Expected End:   10/02/25               Strength       Patient will achieve bilateral hip abduction strength of +4/5       Start:  08/07/25    Expected End:  10/02/25            Patient will achieve bilateral hip adduction strength of +4/5       Start:  08/07/25    Expected End:  10/02/25                Cleo Newell PT

## 2025-08-07 PROBLEM — R29.898 LOWER EXTREMITY WEAKNESS: Status: ACTIVE | Noted: 2025-08-07

## 2025-08-07 PROBLEM — M53.86 DECREASED RANGE OF MOTION OF INTERVERTEBRAL DISCS OF LUMBAR SPINE: Status: ACTIVE | Noted: 2025-08-07

## 2025-08-13 ENCOUNTER — CLINICAL SUPPORT (OUTPATIENT)
Dept: REHABILITATION | Facility: HOSPITAL | Age: 50
End: 2025-08-13
Payer: COMMERCIAL

## 2025-08-13 DIAGNOSIS — M53.86 DECREASED RANGE OF MOTION OF INTERVERTEBRAL DISCS OF LUMBAR SPINE: Primary | ICD-10-CM

## 2025-08-13 DIAGNOSIS — R29.898 WEAKNESS OF LOWER EXTREMITY, UNSPECIFIED LATERALITY: ICD-10-CM

## 2025-08-13 PROCEDURE — 97110 THERAPEUTIC EXERCISES: CPT | Mod: PN

## 2025-08-13 PROCEDURE — 97112 NEUROMUSCULAR REEDUCATION: CPT | Mod: PN

## 2025-08-20 ENCOUNTER — CLINICAL SUPPORT (OUTPATIENT)
Dept: REHABILITATION | Facility: HOSPITAL | Age: 50
End: 2025-08-20
Payer: COMMERCIAL

## 2025-08-20 DIAGNOSIS — R29.898 WEAKNESS OF LOWER EXTREMITY, UNSPECIFIED LATERALITY: ICD-10-CM

## 2025-08-20 DIAGNOSIS — M53.86 DECREASED RANGE OF MOTION OF INTERVERTEBRAL DISCS OF LUMBAR SPINE: Primary | ICD-10-CM

## 2025-08-20 PROCEDURE — 97530 THERAPEUTIC ACTIVITIES: CPT | Mod: PN

## 2025-08-20 PROCEDURE — 97140 MANUAL THERAPY 1/> REGIONS: CPT | Mod: PN

## 2025-08-20 PROCEDURE — 97112 NEUROMUSCULAR REEDUCATION: CPT | Mod: PN

## 2025-08-20 PROCEDURE — 97110 THERAPEUTIC EXERCISES: CPT | Mod: PN

## 2025-08-27 ENCOUNTER — CLINICAL SUPPORT (OUTPATIENT)
Dept: REHABILITATION | Facility: HOSPITAL | Age: 50
End: 2025-08-27
Payer: COMMERCIAL

## 2025-08-27 DIAGNOSIS — R29.898 WEAKNESS OF LOWER EXTREMITY, UNSPECIFIED LATERALITY: ICD-10-CM

## 2025-08-27 DIAGNOSIS — M53.86 DECREASED RANGE OF MOTION OF INTERVERTEBRAL DISCS OF LUMBAR SPINE: Primary | ICD-10-CM

## 2025-08-27 PROCEDURE — 97112 NEUROMUSCULAR REEDUCATION: CPT | Mod: PN

## 2025-08-27 PROCEDURE — 97140 MANUAL THERAPY 1/> REGIONS: CPT | Mod: PN

## 2025-08-27 PROCEDURE — 97110 THERAPEUTIC EXERCISES: CPT | Mod: PN

## 2025-09-03 ENCOUNTER — CLINICAL SUPPORT (OUTPATIENT)
Dept: REHABILITATION | Facility: HOSPITAL | Age: 50
End: 2025-09-03
Payer: COMMERCIAL

## 2025-09-03 DIAGNOSIS — M53.86 DECREASED RANGE OF MOTION OF INTERVERTEBRAL DISCS OF LUMBAR SPINE: Primary | ICD-10-CM

## 2025-09-03 DIAGNOSIS — R29.898 WEAKNESS OF LOWER EXTREMITY, UNSPECIFIED LATERALITY: ICD-10-CM

## 2025-09-03 PROCEDURE — 97112 NEUROMUSCULAR REEDUCATION: CPT | Mod: PN

## 2025-09-03 PROCEDURE — 97110 THERAPEUTIC EXERCISES: CPT | Mod: PN

## (undated) DEVICE — DILATOR VESSEL COONS 22FR 20CM

## (undated) DEVICE — CATH ACUSON ACUNAV 8FR

## (undated) DEVICE — SHEATH INTRODUCER 7FR 11CM

## (undated) DEVICE — CATH 5FR MP 100CM

## (undated) DEVICE — PACK PEDIATRIC ANGIOGRAPHY OMC

## (undated) DEVICE — SPIKE SHORT LG BORE 1-WAY 2IN

## (undated) DEVICE — KIT MNTR POLE MT DUL 12&48 MAC

## (undated) DEVICE — COVER PRB TRNSDUC 7.6X183CM

## (undated) DEVICE — DIALATOR COONS TAPER 12F 20CM

## (undated) DEVICE — GUIDE WIRE WHOLLY FLOPPY

## (undated) DEVICE — LINE 60IN PRESSURE MON.

## (undated) DEVICE — STOPCOCK 3-WAY

## (undated) DEVICE — DILATOR VESSEL COONS 18FR 20CM

## (undated) DEVICE — BOWL STERILE LARGE 32OZ

## (undated) DEVICE — SHEATH DRYSEAL 26FRX65CM

## (undated) DEVICE — VISIPAQUE 320 200ML +PK

## (undated) DEVICE — COVER PROBE US 5.5X58L NON LTX

## (undated) DEVICE — SYR MED RAD 150ML

## (undated) DEVICE — COVER TABLE 44X90 STERILE

## (undated) DEVICE — CATH WEDGE 7FRX110CM

## (undated) DEVICE — KIT CUSTOM MANIFOLD

## (undated) DEVICE — PROTECTION STATION PLUS

## (undated) DEVICE — SUT SILK 0 BLK BR CT-1 30IN

## (undated) DEVICE — WIRE LUNDERQUIST 260CM

## (undated) DEVICE — GUIDEWIRE EMERALD 150CM PTFE

## (undated) DEVICE — Device

## (undated) DEVICE — KIT MICROINTRO 4F .018X40X7CM

## (undated) DEVICE — CATH LEADER 20X8 VYGON

## (undated) DEVICE — SUT SILK 3-0 BLK PS-2 18IN

## (undated) DEVICE — HOLDER NDL STERILE NO SNAG